# Patient Record
Sex: FEMALE | Race: WHITE | NOT HISPANIC OR LATINO | Employment: PART TIME | URBAN - METROPOLITAN AREA
[De-identification: names, ages, dates, MRNs, and addresses within clinical notes are randomized per-mention and may not be internally consistent; named-entity substitution may affect disease eponyms.]

---

## 2021-02-05 ENCOUNTER — OFFICE VISIT - HEALTHEAST (OUTPATIENT)
Dept: FAMILY MEDICINE | Facility: CLINIC | Age: 26
End: 2021-02-05

## 2021-02-05 DIAGNOSIS — F41.1 GENERALIZED ANXIETY DISORDER: ICD-10-CM

## 2021-02-05 DIAGNOSIS — F33.1 MODERATE EPISODE OF RECURRENT MAJOR DEPRESSIVE DISORDER (H): ICD-10-CM

## 2021-02-05 LAB
ALBUMIN SERPL-MCNC: 4.1 G/DL (ref 3.5–5)
ALP SERPL-CCNC: 86 U/L (ref 45–120)
ALT SERPL W P-5'-P-CCNC: 13 U/L (ref 0–45)
ANION GAP SERPL CALCULATED.3IONS-SCNC: 11 MMOL/L (ref 5–18)
AST SERPL W P-5'-P-CCNC: 13 U/L (ref 0–40)
BASOPHILS # BLD AUTO: 0.1 THOU/UL (ref 0–0.2)
BASOPHILS NFR BLD AUTO: 1 % (ref 0–2)
BILIRUB SERPL-MCNC: 0.7 MG/DL (ref 0–1)
BUN SERPL-MCNC: 11 MG/DL (ref 8–22)
CALCIUM SERPL-MCNC: 9.3 MG/DL (ref 8.5–10.5)
CHLORIDE BLD-SCNC: 102 MMOL/L (ref 98–107)
CO2 SERPL-SCNC: 24 MMOL/L (ref 22–31)
CREAT SERPL-MCNC: 0.72 MG/DL (ref 0.6–1.1)
EOSINOPHIL # BLD AUTO: 0.1 THOU/UL (ref 0–0.4)
EOSINOPHIL NFR BLD AUTO: 2 % (ref 0–6)
ERYTHROCYTE [DISTWIDTH] IN BLOOD BY AUTOMATED COUNT: 11.5 % (ref 11–14.5)
GFR SERPL CREATININE-BSD FRML MDRD: >60 ML/MIN/1.73M2
GLUCOSE BLD-MCNC: 84 MG/DL (ref 70–125)
HCT VFR BLD AUTO: 37.3 % (ref 35–47)
HGB BLD-MCNC: 12.8 G/DL (ref 12–16)
IMM GRANULOCYTES # BLD: 0 THOU/UL
IMM GRANULOCYTES NFR BLD: 0 %
LYMPHOCYTES # BLD AUTO: 1.6 THOU/UL (ref 0.8–4.4)
LYMPHOCYTES NFR BLD AUTO: 23 % (ref 20–40)
MCH RBC QN AUTO: 30.8 PG (ref 27–34)
MCHC RBC AUTO-ENTMCNC: 34.3 G/DL (ref 32–36)
MCV RBC AUTO: 90 FL (ref 80–100)
MONOCYTES # BLD AUTO: 0.6 THOU/UL (ref 0–0.9)
MONOCYTES NFR BLD AUTO: 9 % (ref 2–10)
NEUTROPHILS # BLD AUTO: 4.6 THOU/UL (ref 2–7.7)
NEUTROPHILS NFR BLD AUTO: 65 % (ref 50–70)
PLATELET # BLD AUTO: 297 THOU/UL (ref 140–440)
PMV BLD AUTO: 8.7 FL (ref 7–10)
POTASSIUM BLD-SCNC: 3.9 MMOL/L (ref 3.5–5)
PROT SERPL-MCNC: 7.5 G/DL (ref 6–8)
RBC # BLD AUTO: 4.16 MILL/UL (ref 3.8–5.4)
SODIUM SERPL-SCNC: 137 MMOL/L (ref 136–145)
TSH SERPL DL<=0.005 MIU/L-ACNC: 1.32 UIU/ML (ref 0.3–5)
WBC: 7 THOU/UL (ref 4–11)

## 2021-02-05 ASSESSMENT — PATIENT HEALTH QUESTIONNAIRE - PHQ9: SUM OF ALL RESPONSES TO PHQ QUESTIONS 1-9: 22

## 2021-02-05 ASSESSMENT — ANXIETY QUESTIONNAIRES
7. FEELING AFRAID AS IF SOMETHING AWFUL MIGHT HAPPEN: SEVERAL DAYS
IF YOU CHECKED OFF ANY PROBLEMS ON THIS QUESTIONNAIRE, HOW DIFFICULT HAVE THESE PROBLEMS MADE IT FOR YOU TO DO YOUR WORK, TAKE CARE OF THINGS AT HOME, OR GET ALONG WITH OTHER PEOPLE: VERY DIFFICULT
GAD7 TOTAL SCORE: 15
2. NOT BEING ABLE TO STOP OR CONTROL WORRYING: NEARLY EVERY DAY
1. FEELING NERVOUS, ANXIOUS, OR ON EDGE: NEARLY EVERY DAY
5. BEING SO RESTLESS THAT IT IS HARD TO SIT STILL: SEVERAL DAYS
4. TROUBLE RELAXING: MORE THAN HALF THE DAYS
6. BECOMING EASILY ANNOYED OR IRRITABLE: MORE THAN HALF THE DAYS
3. WORRYING TOO MUCH ABOUT DIFFERENT THINGS: NEARLY EVERY DAY

## 2021-02-05 ASSESSMENT — MIFFLIN-ST. JEOR: SCORE: 1416.76

## 2021-02-12 ENCOUNTER — COMMUNICATION - HEALTHEAST (OUTPATIENT)
Dept: FAMILY MEDICINE | Facility: CLINIC | Age: 26
End: 2021-02-12

## 2021-03-04 ENCOUNTER — COMMUNICATION - HEALTHEAST (OUTPATIENT)
Dept: FAMILY MEDICINE | Facility: CLINIC | Age: 26
End: 2021-03-04

## 2021-03-04 DIAGNOSIS — F33.1 MODERATE EPISODE OF RECURRENT MAJOR DEPRESSIVE DISORDER (H): ICD-10-CM

## 2021-03-04 DIAGNOSIS — F41.1 GENERALIZED ANXIETY DISORDER: ICD-10-CM

## 2021-03-04 RX ORDER — BUPROPION HYDROCHLORIDE 150 MG/1
150 TABLET, EXTENDED RELEASE ORAL 2 TIMES DAILY
Qty: 180 TABLET | Refills: 3 | Status: SHIPPED | OUTPATIENT
Start: 2021-03-04 | End: 2021-10-27

## 2021-03-05 RX ORDER — TRAZODONE HYDROCHLORIDE 50 MG/1
50 TABLET, FILM COATED ORAL AT BEDTIME
Qty: 90 TABLET | Refills: 3 | Status: SHIPPED | OUTPATIENT
Start: 2021-03-05 | End: 2021-11-04

## 2021-03-05 RX ORDER — PAROXETINE 40 MG/1
40 TABLET, FILM COATED ORAL EVERY MORNING
Qty: 90 TABLET | Refills: 1 | Status: SHIPPED | OUTPATIENT
Start: 2021-03-05 | End: 2021-07-15

## 2021-03-17 ENCOUNTER — COMMUNICATION - HEALTHEAST (OUTPATIENT)
Dept: FAMILY MEDICINE | Facility: CLINIC | Age: 26
End: 2021-03-17

## 2021-04-02 ENCOUNTER — COMMUNICATION - HEALTHEAST (OUTPATIENT)
Dept: FAMILY MEDICINE | Facility: CLINIC | Age: 26
End: 2021-04-02

## 2021-05-27 ASSESSMENT — PATIENT HEALTH QUESTIONNAIRE - PHQ9: SUM OF ALL RESPONSES TO PHQ QUESTIONS 1-9: 22

## 2021-05-28 ASSESSMENT — ANXIETY QUESTIONNAIRES: GAD7 TOTAL SCORE: 15

## 2021-06-05 VITALS
SYSTOLIC BLOOD PRESSURE: 106 MMHG | RESPIRATION RATE: 16 BRPM | BODY MASS INDEX: 24.46 KG/M2 | HEART RATE: 80 BPM | HEIGHT: 65 IN | WEIGHT: 146.8 LBS | DIASTOLIC BLOOD PRESSURE: 65 MMHG

## 2021-06-15 NOTE — PROGRESS NOTES
Assessment & Plan     1. Generalized anxiety disorder  Thyroid [facility]    Comprehensive Metabolic Panel    1(CBC and Differential)    AMB REFERRAL TO MENTAL HEALTH AND ADDICTION  - Adult (18+); Assessment and Testing; General Psychological Testing; [facility] [phone]; We will contact you to schedule the appointment or please call with any question...    Ambulatory referral to Psychiatry   2. Moderate episode of recurrent major depressive disorder (H)  Thyroid [facility]    Comprehensive Metabolic Panel    1(CBC and Differential)    AMB REFERRAL TO MENTAL HEALTH AND ADDICTION  - Adult (18+); Assessment and Testing; General Psychological Testing; [facility] [phone]; We will contact you to schedule the appointment or please call with any question...    buPROPion (WELLBUTRIN SR) 150 MG 12 hr tablet    Ambulatory referral to Psychiatry     Medical decision making: Nimisha is a 25-year-old-year-old female with major depression and anxiety.  A referral to psychiatry has been done.  She denies any active plans of suicide.  Is a depression that is most bothersome to her.  We discussed augmenting with Wellbutrin.  We discussed the pros and cons and patient is willing to give this a try.  She will start with 1 tablet for about 3 days to see how it makes her feel and then go to 1 tablet 2 times daily.    She had initially scheduled this appointment as a physical exam.  However she really wanted to address the above issues.  She is up-to-date with her Pap and due in November when she will return for a physical.    Regarding her acne, she uses OTC Differin gel and feels it is under good control.           Follow Up    Follow Up Actions Taken  Mental Health Referral placed      Discussed the following ways the patient can remain in a safe environment:  be around others    Return in about 9 months (around 11/5/2021).    Martin Sanchez MD  [facility]  CLINIC [facility]    Subjective   Chief Complaint   Patient presents with     [facility]     Annual Exam     would like blood work, not fasting     Nimisha Jasso is 25 y.o. and presents to clinic today for the following health issues   HPI patient here today for follow-up of her depression and anxiety.  In the past she has followed at [facility] in [place].  These records available in care everywhere.  She has been on Paxil for her depression after being tried on other medications.  She does not remember exactly what medication has been tried.  She also uses trazodone for sleep.  Although it is not perfect, it works for her mostly.  She would like some blood work done today for her depression.  She would like specifically thyroid to be checked as there is family history of low thyroid.  She describes herself as being a sad child per her mom.  In high school, she was the query one.  In college, she came out in the open for being a lesbian.  However, she does not think this has any implication or depression.  Currently she is under some stress due to long distance relationship with her partner who is currently in [employer] teaching English.  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  She is currently sharing residence with 2 of her other friends from college.  And this is recent in January and has been a real help.  She feels financially stable.  No growing up, she did have a eating disorder and today allows to have her weight checked but does not want to know.      Depression and Anxiety Follow-Up    How are you doing with your depression since your last visit?: Worse    How are you doing with your anxiety since your last visit?: Medium    Are you having other symptoms that might be associated with depression or anxiety?: Yes:  Muscle aches , back pain, weakness,      Have you had a significant life event?: Relationship Concerns     Do you have  any concerns with your use of alcohol or other drugs?: No    Social History     Tobacco Use     Smoking status: Never Smoker     Smokeless tobacco: Never Used   Substance Use Topics     Alcohol use: Yes     Frequency: 2-4 times a month     Drinks per session: 1 or 2     Binge frequency: Never     Drug use: Never       PHQ-9 DEPRESSION SCALE 2021   Little interest or pleasure in doing things 3   Feeling down, depressed, or hopeless 3   Trouble falling or staying asleep, or sleeping too much 2   Feeling tired or having little energy 3   Poor appetite or overeating 3   Feeling bad about yourself - or that you are a failure or have let yourself or your family down 3   Trouble concentrating on things, such as reading the newspaper or watching television 2   Moving or speaking so slowly that other people could have noticed. Or the opposite - being so fidgety or restless that you have been moving around a lot more than usual 1   Thoughts that you would be better off dead, or of hurting yourself in some way 2   PHQ-9 Total Score 22   If you checked off any problems, how difficult have these problems made it for you to do your work, take care of things at home, or get along with other people? Extremely dIfficult   Some recent data might be hidden       BRISA-7 Screening Results:  Feeling nervous, anxious or on edge: 3 (2021  4:00 PM)  Not being able to stop or control worry: 3 (2021  4:00 PM)  Worrying too much about different things: 3 (2021  4:00 PM)  Trouble relaxin (2021  4:00 PM)  Being so restless that is is hard to sit still: 1 (2021  4:00 PM)  Becoming easily annnoyed or irritable: 2 (2021  4:00 PM)  Feeling afraid as if something awful might happen: 1 (2021  4:00 PM)  BRISA-7 Total: 15 (2021  4:00 PM)  How difficult did these problems make it for you to do your work, take care of things at home or get along with other people? : Very difficult (2021  4:00 PM)  How difficult did  "these problems make it for you to do your work, take care of things at home or get along with other people? : Very difficult (2/5/2021  4:00 PM)      Suicide Assessment Five-step Evaluation and Treatment (SAFE-T)    How many servings of fruits and vegetables do you eat daily?  2-3    On average, how many sweetened beverages do you drink each day (Examples: soda, juice, sweet tea, etc.  Do NOT count diet or artificially sweetened beverages)?   1    How many days per week do you exercise enough to make your heart beat faster? 3 or less    How many minutes a day do you exercise enough to make your heart beat faster? 10 - 19    How many days per week do you miss taking your medication? 0        Review of Systems  Denies shortness of breath, chest pain.  Denies nausea/vomiting.      Objective    /65 (Patient Site: Left Arm, Patient Position: Sitting, Cuff Size: Adult Regular)   Pulse 80   Resp 16   Ht 5' 5\" (1.651 m)   Wt 146 lb 12.8 oz (66.6 kg)   LMP 01/23/2021 (Approximate)   BMI 24.43 kg/m    Body mass index is 24.43 kg/m .  Physical Exam  GENERAL: Healthy, alert and no distress  EYES: Eyes grossly normal to inspection. No discharge or erythema, or obvious scleral/conjunctival abnormalities.  NECK: no adenopathy, thyroid normal to palpation and trachea midline and normal to palpation  RESP: lungs clear to auscultation - no rales, rhonchi or wheezes  CV: regular rates and rhythm and no murmur, click or rub  NEURO: Cranial nerves grossly intact. Mentation and speech appropriate for age.  PSYCH: Mentation appears normal, affect normal/bright, judgement and insight intact, normal speech and appearance well-groomed              "

## 2021-06-15 NOTE — TELEPHONE ENCOUNTER
Requested Prescriptions     Pending Prescriptions Disp Refills     buPROPion (WELLBUTRIN SR) 150 MG 12 hr tablet 60 tablet 2     Sig: Take 1 tablet (150 mg total) by mouth 2 (two) times a day.

## 2021-06-15 NOTE — TELEPHONE ENCOUNTER
Refill Approved    Rx renewed per Medication Renewal Policy. Medication was last renewed on 2/5/21, last OV 2/5/21.    Mira Morales, [facility] Triage/Med Refill 3/4/2021     Requested Prescriptions   Pending Prescriptions Disp Refills     buPROPion (WELLBUTRIN SR) 150 MG 12 hr tablet 180 tablet 3     Sig: Take 1 tablet (150 mg total) by mouth 2 (two) times a day.       Tricyclics/Misc Antidepressant/Antianxiety Meds Refill Protocol Passed - 3/4/2021 10:50 AM        Passed - PCP or prescribing provider visit in last year     Last office visit with prescriber/PCP: Visit date not found OR same dept: Visit date not found OR same specialty: Visit date not found  Last physical: 2/5/2021 Last MTM visit: Visit date not found   Next visit within 3 mo: Visit date not found  Next physical within 3 mo: Visit date not found  Prescriber OR PCP: Martin Sanchez MD  Last diagnosis associated with med order: 1. Moderate episode of recurrent major depressive disorder (H)  - buPROPion (WELLBUTRIN SR) 150 MG 12 hr tablet; Take 1 tablet (150 mg total) by mouth 2 (two) times a day.  Dispense: 180 tablet; Refill: 3    If protocol passes may refill for 12 months if within 3 months of last provider visit (or a total of 15 months).

## 2021-06-16 PROBLEM — F33.1 MODERATE EPISODE OF RECURRENT MAJOR DEPRESSIVE DISORDER (H): Status: ACTIVE | Noted: 2018-11-23

## 2021-06-16 PROBLEM — F41.1 GENERALIZED ANXIETY DISORDER: Status: ACTIVE | Noted: 2017-07-19

## 2021-06-16 PROBLEM — L70.0 ACNE VULGARIS: Status: ACTIVE | Noted: 2017-11-03

## 2021-06-16 PROBLEM — G47.00 INSOMNIA: Status: ACTIVE | Noted: 2018-11-23

## 2021-06-18 NOTE — PATIENT INSTRUCTIONS - HE
Patient Instructions by Martin Sanchez MD at 2/5/2021  3:10 PM     Author: Martin Sanchez MD Service: -- Author Type: Physician    Filed: 2/5/2021  3:57 PM Encounter Date: 2/5/2021 Status: Signed    : Martin Sanchez MD (Physician)       Patient Education     Depression: Tips to Help Yourself    As your healthcare providers help treat your depression, you can also help yourself. Keep in mind that your illness affects you emotionally, physically, mentally, and socially. So full recovery will take time. Take care of your body and your soul, and be patient with yourself as you get better.  Self-care    Educate yourself. Read about treatment and medicine options. If you have the energy, attend local conferences or support groups. Keep a list of useful websites and helpful books and use them as needed. This illness is not your fault. Dont blame yourself for your depression.    Manage early symptoms. If you notice symptoms returning, experience triggers, or identify other factors that may lead to a depressive episode, get help as soon as possible. Ask trusted friends and family to monitor your behavior and let you know if they see anything of concern.    Work with your provider. Find a provider you can trust. Communicate honestly with that person and share information on your treatment for depression and your reaction to medicines.    Be prepared for a crisis. Know what to do if you experience a crisis. Keep the phone number of a crisis hotline and know the location of your community's urgent care centers and the closest emergency department.    Hold off on big decisions. Depression can cloud your judgment. So wait until you feel better before making major life decisions, such as changing jobs, moving, or getting  or .    Be patient. Recovering from depression is a process. Dont be discouraged if it takes some time to feel better.    Keep it simple. Depression saps your energy and  concentration. So you wont be able to do all the things you used to do. Set small goals and do what you can.    Be with others. Dont isolate yourself--youll only feel worse. Try to be with other people. And take part in fun activities when you can. Go to a movie, ballgame, [employer] service, or social event. Talk openly with people you can trust. And accept help when its offered.  Take care of your body  People with depression often lose the desire to take care of themselves. That only makes their problems worse. During treatment and afterward, make a point to:    Exercise. Its a great way to take care of your body. And studies have shown that exercise helps fight depression.    Avoid drugs and alcohol. These may ease the pain in the short term. But theyll only make your problems worse in the long run.    Get relief from stress. Ask your healthcare provider for relaxation exercises and techniques to help relieve stress.    Eat right. A balanced and healthy diet helps keep your body healthy.  Date Last Reviewed: 1/1/2017 2000-2019 [employer]. [address] [place] [address], [place] [address]. All rights reserved. This information is not intended as a substitute for professional medical care. Always follow your healthcare professional's instructions.

## 2021-06-27 ENCOUNTER — HEALTH MAINTENANCE LETTER (OUTPATIENT)
Age: 26
End: 2021-06-27

## 2021-07-12 DIAGNOSIS — F33.1 MODERATE EPISODE OF RECURRENT MAJOR DEPRESSIVE DISORDER (H): ICD-10-CM

## 2021-07-12 DIAGNOSIS — F41.1 GENERALIZED ANXIETY DISORDER: ICD-10-CM

## 2021-07-12 NOTE — TELEPHONE ENCOUNTER
Reason for Call:  Other prescription    Detailed comments: Patient called stating she has a psychiatry appt in a month and she thinks she may need a refill on the paroxetine. Patient stated that if she stopped taking it does make her have some withdrawals. Patient is wondering what provider recommends. She would like a call back.      Phone Number Patient can be reached at: Cell number on file:    Telephone Information:   Mobile [phone]       Best Time: Any time    Can we leave a detailed message on this number? YES    Call taken on 7/12/2021 at 2:35 PM by Verna Chapman

## 2021-07-13 RX ORDER — PAROXETINE 40 MG/1
40 TABLET, FILM COATED ORAL EVERY MORNING
Qty: 90 TABLET | Refills: 1 | OUTPATIENT
Start: 2021-07-13

## 2021-07-13 NOTE — TELEPHONE ENCOUNTER
Need the pharmacy to send prescription.  Is patient taking Wellbutrin and trazodone?  I had sent 90 pills in March with 1 refill and so she should already have a refill for Paxil.  Did she wean herself off or stop suddenly?  She may benefit from a video/telephone visit for further discussion.

## 2021-07-14 NOTE — TELEPHONE ENCOUNTER
Pt stated she is taking Wellbutrin and trazodone, and slowly tapered off paroxetine. Pt having emotional pain of 9 and 10 daily and has concerns about waiting until her psychiatrist appt in August. Pt schedule for VV 7/25 with DR ESPINAL Pharmacy is [facility].

## 2021-07-15 ENCOUNTER — VIRTUAL VISIT (OUTPATIENT)
Dept: FAMILY MEDICINE | Facility: CLINIC | Age: 26
End: 2021-07-15

## 2021-07-15 DIAGNOSIS — F41.1 GENERALIZED ANXIETY DISORDER: ICD-10-CM

## 2021-07-15 DIAGNOSIS — F32.2 SEVERE MAJOR DEPRESSION WITHOUT PSYCHOTIC FEATURES (H): Primary | ICD-10-CM

## 2021-07-15 PROCEDURE — 96127 BRIEF EMOTIONAL/BEHAV ASSMT: CPT | Performed by: FAMILY MEDICINE

## 2021-07-15 PROCEDURE — 99213 OFFICE O/P EST LOW 20 MIN: CPT | Mod: 95 | Performed by: FAMILY MEDICINE

## 2021-07-15 RX ORDER — PAROXETINE 40 MG/1
40 TABLET, FILM COATED ORAL EVERY MORNING
Qty: 90 TABLET | Refills: 1 | Status: SHIPPED | OUTPATIENT
Start: 2021-07-15 | End: 2021-10-27

## 2021-07-15 ASSESSMENT — ANXIETY QUESTIONNAIRES
GAD7 TOTAL SCORE: 14
7. FEELING AFRAID AS IF SOMETHING AWFUL MIGHT HAPPEN: MORE THAN HALF THE DAYS
2. NOT BEING ABLE TO STOP OR CONTROL WORRYING: MORE THAN HALF THE DAYS
IF YOU CHECKED OFF ANY PROBLEMS ON THIS QUESTIONNAIRE, HOW DIFFICULT HAVE THESE PROBLEMS MADE IT FOR YOU TO DO YOUR WORK, TAKE CARE OF THINGS AT HOME, OR GET ALONG WITH OTHER PEOPLE: VERY DIFFICULT
6. BECOMING EASILY ANNOYED OR IRRITABLE: NEARLY EVERY DAY
4. TROUBLE RELAXING: SEVERAL DAYS
1. FEELING NERVOUS, ANXIOUS, OR ON EDGE: MORE THAN HALF THE DAYS
5. BEING SO RESTLESS THAT IT IS HARD TO SIT STILL: MORE THAN HALF THE DAYS
3. WORRYING TOO MUCH ABOUT DIFFERENT THINGS: MORE THAN HALF THE DAYS

## 2021-07-15 ASSESSMENT — PATIENT HEALTH QUESTIONNAIRE - PHQ9: SUM OF ALL RESPONSES TO PHQ QUESTIONS 1-9: 22

## 2021-07-15 NOTE — PROGRESS NOTES
Nimisha is a 25 year old who is being evaluated via a billable video visit.      How would you like to obtain your AVS? MyChart  If the video visit is dropped, the invitation should be resent by: Text to cell phone: [phone]  Will anyone else be joining your video visit? No    Video Start Time: 1:05 PM    Assessment & Plan     ICD-10-CM    1. Severe major depression without psychotic features (H)  F32.2    2. Generalized anxiety disorder  F41.1      Medical decision making: Patient is a 25-year-old with severe depression and anxiety who presents today for further management.  She has been on Paxil for a long time and did not think it was helping much and wean herself off.  She describes her depression as mental pain with suicidal thoughts and ideation without a plan.  At this time we discussed various options including adding a atypical antipsychotic like Seroquel.  Patient was contemplating this.  Also offered increasing the dose of Wellbutrin.  Note that I had started her on Wellbutrin which did help but taking the evening dose was making her too active in the evening.  She ended up taking only the morning dose.    Finally at this time, she decided that she will go up on her Wellbutrin dose in the morning and see how it helps her in the next few days otherwise reach out to me to start Seroquel.  Patient does have an appointment coming up with psychiatry in August.  She reassures me that she is supported by her friends were well aware about her mental health condition and will seek help with can suicidal thoughts and ideation and reach out to me too.  [id number]}     Depression Screening Follow Up    PHQ 7/15/2021   PHQ-9 Total Score 22   Q9: Thoughts of better off dead/self-harm past 2 weeks More than half the days     Follow Up Actions Taken  Scheduled appointment with [facility]  Mental Health Referral placed    Discussed the following ways the patient can remain in a safe environment:  be around others  MEDICATIONS:         - Increase to 2 tablets daily of Wellbutrin.  She wants to avoid taking the lingers       - Continue other medications without change    No follow-ups on file.    Martin Sanchez MD  [facility]    Kaia Bansal is a 25 year old who presents for the following health issues  accompanied by Nimisha RENATO Romero's self:    HPI   Chief Complaint   Patient presents with     Anxiety     GAD7 and PHQ9 done today     Patient is a 25-year-old with moderately severe depression worsening over past presents today for additional medication to overcome current mental health pain that she is undergoing.  She had weaned herself off Paxil as despite being on for few years it had not helped.  At last visit, had started on Wellbutrin which she thinks really helped but taking the evening dose was making her too active and she was taking only 1 pill in the morning.  She was taking trazodone at night but makes her groggy.  She feels that her sleep is not adequate and was wondering about alternative medication  She continues to have suicidal thoughts and ideation.  (  Same as in the past visit). .  She denies any plan.  She does have very supportive roommates were best friends and they are aware about her mental health per patient.  Objective           Vitals:  No vitals were obtained today due to virtual visit.    Physical Exam   GENERAL: Healthy, alert and no distress  EYES: Eyes grossly normal to inspection.  No discharge or erythema, or obvious scleral/conjunctival abnormalities.  RESP: No audible wheeze, cough, or visible cyanosis.  No visible retractions or increased work of breathing.    SKIN: Visible skin clear. No significant rash, abnormal pigmentation or lesions.  NEURO: Cranial nerves grossly intact.  Mentation and speech appropriate for age.  PSYCH: mentation appears normal, affect flat, judgement and insight intact and appearance well groomed          Video-Visit Details    Type of  service:  Video Visit    Video End Time:1:20    Originating Location (pt. Location): Home    Distant Location (provider location):  [facility]     Platform used for Video Visit: Doximity     PHQ 7/15/2021   PHQ-9 Total Score 22   Q9: Thoughts of better off dead/self-harm past 2 weeks More than half the days     BRISA-7 SCORE 2/5/2021 7/15/2021   Total Score 15 14     PHQ 2/5/2021 7/15/2021   PHQ-9 Total Score 22 22   Q9: Thoughts of better off dead/self-harm past 2 weeks More than half the days More than half the days

## 2021-07-15 NOTE — PATIENT INSTRUCTIONS
"Jessikamarcus GROSS Romero     SAFETY PLAN:  Step 1: Warning signs / cues (Thoughts, images, mood, situation, behavior) that a crisis may be developing:    Thoughts: \"I don't matter\", \"People would be better off without me\", \"I'm a burden\", \"I can't do this anymore\", \"I just want this to end\" and \"Nothing makes it better\"    Images: visions of harm: .    Thinking Processes: racing thoughts and highly critical and negative thoughts: .    Mood: worsening depression, hopelessness, helplessness, intense anger and intense worry    Behaviors: isolating/withdrawing , can't stop crying, giving things away, saying good-bye, not taking care of myself, not taking care of my responsibilities, sleeping too much and not sleeping enough    Situations: pain   Step 2: Coping strategies - Things I can do to take my mind off of my problems without contacting another person (relaxation technique, physical activity):    Distress Tolerance Strategies:  relaxation activities: anything you enjoy, pray, paced breathing/progressive muscle relaxation and BEING WITH YOUR ROOM MATES/ FRIENDS    Physical Activities: yoga, meditation, deep breathing and stretching     Focus on helpful thoughts:  \"This is temporary\", \"I will get through this\", \"It always passes\" and \"Ride the wave\"  Step 3: People and social settings that provide distraction:   Do talk to your roommates and friends and seek support    volunteering, support group (i.e. twelve-step) and work   Step 4: Remind myself of people and things that are important to me and worth living for: Your friends and family that you associate with      Step 5: When I am in crisis, I can ask these people to help me use my safety plan:   Your roommates who are very good friends  Step 6: Making the environment safe:     be around others  Step 7: Professionals or agencies I can contact during a crisis:    [facility] Daytime Number: " [phone]    Suicide Prevention Lifeline: 7-544-327-TALK (0263)    Crisis Text Line Service (available 24 hours a day, 7 days a week): Text [place] to 815848    Call 911 or go to my nearest emergency department.   I helped develop this safety plan and agree to use it when needed.  I have been given a copy of this plan.      Client signature _________________________________________________________________  Today s date:  7/15/2021  Adapted from Safety Plan Template 2008 Yajaira Roque and Shahab Pugh is reprinted with the express permission of the authors.  No portion of the Safety Plan Template may be reproduced without the express, written permission.  You can contact the authors at [email or web address] or [email or web address].

## 2021-07-16 ENCOUNTER — TELEPHONE (OUTPATIENT)
Dept: FAMILY MEDICINE | Facility: CLINIC | Age: 26
End: 2021-07-16

## 2021-07-16 DIAGNOSIS — F32.2 SEVERE MAJOR DEPRESSION WITHOUT PSYCHOTIC FEATURES (H): Primary | ICD-10-CM

## 2021-07-16 ASSESSMENT — ANXIETY QUESTIONNAIRES: GAD7 TOTAL SCORE: 14

## 2021-07-16 NOTE — TELEPHONE ENCOUNTER
Incoming call from pt with concern of withdrawal symptoms after stopping paxil. Pt stated that she is dizzy, nauseous, lightheaded. Pt started noticing some symptoms when she cut down to half a pill but now that she has been completely off the medication for a couple days the symptoms just keep getting worse. Pt is wondering if there is something she can take or do in the meantime to help with the withdrawal symptoms?

## 2021-07-17 RX ORDER — PAROXETINE 20 MG/1
20 TABLET, FILM COATED ORAL EVERY MORNING
Qty: 30 TABLET | Refills: 0 | Status: SHIPPED | OUTPATIENT
Start: 2021-07-17 | End: 2021-10-27

## 2021-07-17 NOTE — TELEPHONE ENCOUNTER
I have sent a small prescription of Paxil 20 mg and sent patient a message for weaning protocol until she sees psychiatry.    Martin Sanchez MD

## 2021-08-11 DIAGNOSIS — F32.2 SEVERE MAJOR DEPRESSION WITHOUT PSYCHOTIC FEATURES (H): ICD-10-CM

## 2021-08-12 DIAGNOSIS — F32.2 SEVERE MAJOR DEPRESSION WITHOUT PSYCHOTIC FEATURES (H): ICD-10-CM

## 2021-08-14 RX ORDER — PAROXETINE 20 MG/1
20 TABLET, FILM COATED ORAL EVERY MORNING
Qty: 30 TABLET | Refills: 0 | OUTPATIENT
Start: 2021-08-14

## 2021-08-27 ENCOUNTER — TELEPHONE (OUTPATIENT)
Dept: BEHAVIORAL HEALTH | Facility: CLINIC | Age: 26
End: 2021-08-27

## 2021-08-27 ENCOUNTER — HOSPITAL ENCOUNTER (OUTPATIENT)
Dept: BEHAVIORAL HEALTH | Facility: CLINIC | Age: 26
Discharge: HOME | End: 2021-08-27
Attending: FAMILY MEDICINE | Admitting: FAMILY MEDICINE
Payer: COMMERCIAL

## 2021-08-27 PROCEDURE — 90791 PSYCH DIAGNOSTIC EVALUATION: CPT | Mod: GT | Performed by: COUNSELOR

## 2021-08-27 RX ORDER — ESCITALOPRAM OXALATE 10 MG/1
10 TABLET ORAL DAILY
COMMUNITY
End: 2021-10-26

## 2021-08-27 ASSESSMENT — ANXIETY QUESTIONNAIRES
7. FEELING AFRAID AS IF SOMETHING AWFUL MIGHT HAPPEN: NEARLY EVERY DAY
4. TROUBLE RELAXING: MORE THAN HALF THE DAYS
1. FEELING NERVOUS, ANXIOUS, OR ON EDGE: NEARLY EVERY DAY
3. WORRYING TOO MUCH ABOUT DIFFERENT THINGS: NEARLY EVERY DAY
GAD7 TOTAL SCORE: 20
5. BEING SO RESTLESS THAT IT IS HARD TO SIT STILL: NEARLY EVERY DAY
2. NOT BEING ABLE TO STOP OR CONTROL WORRYING: NEARLY EVERY DAY
6. BECOMING EASILY ANNOYED OR IRRITABLE: NEARLY EVERY DAY

## 2021-08-27 ASSESSMENT — COLUMBIA-SUICIDE SEVERITY RATING SCALE - C-SSRS
TOTAL  NUMBER OF INTERRUPTED ATTEMPTS PAST 3 MONTHS: NO
TOTAL  NUMBER OF ABORTED OR SELF INTERRUPTED ATTEMPTS PAST LIFETIME: NO
3. HAVE YOU BEEN THINKING ABOUT HOW YOU MIGHT KILL YOURSELF?: YES
5. HAVE YOU STARTED TO WORK OUT OR WORKED OUT THE DETAILS OF HOW TO KILL YOURSELF? DO YOU INTEND TO CARRY OUT THIS PLAN?: NO
TOTAL  NUMBER OF ABORTED OR SELF INTERRUPTED ATTEMPTS PAST 3 MONTHS: NO
4. HAVE YOU HAD THESE THOUGHTS AND HAD SOME INTENTION OF ACTING ON THEM?: YES
4. HAVE YOU HAD THESE THOUGHTS AND HAD SOME INTENTION OF ACTING ON THEM?: NO
2. HAVE YOU ACTUALLY HAD ANY THOUGHTS OF KILLING YOURSELF LIFETIME?: YES
1. IN THE PAST MONTH, HAVE YOU WISHED YOU WERE DEAD OR WISHED YOU COULD GO TO SLEEP AND NOT WAKE UP?: YES
ATTEMPT PAST THREE MONTHS: NO
5. HAVE YOU STARTED TO WORK OUT OR WORKED OUT THE DETAILS OF HOW TO KILL YOURSELF? DO YOU INTEND TO CARRY OUT THIS PLAN?: NO
TOTAL  NUMBER OF INTERRUPTED ATTEMPTS LIFETIME: NO
ATTEMPT LIFETIME: NO
1. IN THE PAST MONTH, HAVE YOU WISHED YOU WERE DEAD OR WISHED YOU COULD GO TO SLEEP AND NOT WAKE UP?: YES
2. HAVE YOU ACTUALLY HAD ANY THOUGHTS OF KILLING YOURSELF?: YES
6. HAVE YOU EVER DONE ANYTHING, STARTED TO DO ANYTHING, OR PREPARED TO DO ANYTHING TO END YOUR LIFE?: NO
6. HAVE YOU EVER DONE ANYTHING, STARTED TO DO ANYTHING, OR PREPARED TO DO ANYTHING TO END YOUR LIFE?: NO

## 2021-08-27 ASSESSMENT — PATIENT HEALTH QUESTIONNAIRE - PHQ9: SUM OF ALL RESPONSES TO PHQ QUESTIONS 1-9: 25

## 2021-08-27 NOTE — PATIENT INSTRUCTIONS
Welcome to the Adult Mental Health Outpatient Programs. Thank you for choosing us at [facility]!     Managing mental health symptoms while balancing life stressors can be a struggle. Our mental health programming will provide you the therapy, education, skills and support needed to improve your well-being and to live a healthy and more manageable lifestyle.     After completing the Diagnostic Assessment, you will receive a recommendation for a level of care or specialty service that is right for you. Our Outpatient Mental Health programs are all group-based and allow you to meet with peers who are trying to manage similar symptoms or life circumstances in a safe and therapeutic setting.     Program Recommendations for: Adult Day Treatment     You will be scheduled to join the following program:  Adult Day Treatment    You will be in the follow group /track:  5A    Please attend: [facility]          at: 9 am - 12 pm     Start date: 08/30/2021    What will happen next?      You will receive a series of calls from our [facility] providers and/or schedulers to prepare you for your program participation.       Admission Call: Program staff will contact you after your diagnostic assessment to provide a brief check in and to complete the admission process. We will ask you about concerns that may need to be addressed right away. This could include: personal safety, insurance clarification, technology access, or another concern you may have. This call may occur days in advance or right before your first scheduled group.       Physical Health Screen Call:  A nurse will contact you either prior to admission or during your first week in the program to ask a few required physical health screening questions and discuss concerns as needed.      Provider/Scheduling Call: Program staff may also call to schedule an individual appointment with a psychiatrist or psychologist (therapist). This will depend on your needs  and may be required for the program that was recommended for you. This program requirement does NOT replace your follow up with your community provider.  However, it is important that you do NOT see your community psychiatric provider on the same day that you see the program psychiatrist. If you do, this could result in a denial from your insurance. Please let us know if you have any concerns and we will help you.      Disability or FMLA paperwork requests: Please coordinate with your community provider to complete paperwork. This will be easiest for you. Most of the time, they want the same provider to follow you during your time off. If you do not have a community provider, please schedule an appointment with one as soon as possible. The Partial Hospitalization Program provider may assist with paperwork if you have not already established care in the community. However, this is a short-term program and responsibility for paperwork must transfer to another provider when you discharge from the Partial Hospitalization Program. We do NOT have a provider to complete paperwork in Adult Day Treatment, Adult Dual Disorder Program or 55+ Program at this time.      WAIT LIST: If you are placed on the Wait List following your diagnostic assessment, you will receive a phone call within a few days to discuss a tentative start date and plan.  Please contact us at the phone number listed below at any time with additional questions or if you are choosing to be removed from the Wait List.      What if I still have questions or cannot attend as planned? :  Adult Day Treatment [phone].     We hope to be able to answer your questions during the admission call. You can also reach out to us by contacting the program directly at:  Adult Day Treatment [phone].     Sincerely,   Your Outpatient Adult Mental Health Program Team             SAFETY PLAN:    Step 1: Warning signs / cues (Thoughts, images, mood, situation, behavior)  "that a crisis may be developing:      Thoughts: \"I don't matter\", \"People would be better off without me\" and \"I'm a burden\"    Images: obsessive thoughts of death or dying: someone harming me and visions of harm:     Thinking Processes: ruminations (can't stop thinking about my problems), racing thoughts, intrusive thoughts (bothersome, unwanted thoughts that come out of nowhere), highly critical and negative thoughts and disorganized thinking.    Mood: worsening depression, hopelessness and helplessness    Behaviors: isolating/withdrawing  and can't stop crying    Situations: None       Step 2: Coping strategies - Things I can do to take my mind off of my problems without contacting another person (relaxation technique, physical activity):      Distress Tolerance Strategies:  arts and crafts: Draw    Physical Activities: go for a walk    Focus on helpful thoughts:  \"This is temporary\", \"I will get through this\" and \"It always passes\"    Step 3: People and social settings that provide distraction:     Roommates   watch tv, play dungeons and dragoons      Step 4: Remind myself of people and things that are important to me and worth living for:  Mother and friends    Step 5: When I am in crisis, I can ask these people to help me use my safety plan:     Gisselle Jasso (mother) [phone]    Step 6: Making the environment safe:       remove alcohol, remove drugs and be around others    Step 7: Professionals or agencies I can contact during a crisis:      Suicide Prevention Lifeline: 1-935-635-TALK (0265)    Crisis Text Line Service (available 24 hours a day, 7 days a week): Text [place] to 513571    Call  **CRISIS (062966) from a cell phone to talk to a team of professionals who can help you.    Crisis Services By County: Phone Number:   [place]     [phone]   [place]    [phone]   [place]    [phone]   [place]    [phone]   [place]    [phone]   [place] [phone]   [place]     [phone] "       Call 911 or go to my nearest emergency department.     I helped develop this safety plan and agree to use it when needed.  I have been given a copy of this plan.        Today s date:  8/27/2021  Adapted from Safety Plan Template 2008 Yajaira Roque and Shahab Pugh is reprinted with the express permission of the authors.  No portion of the Safety Plan Template may be reproduced without the express, written permission.  You can contact the authors at [email or web address] or [email or web address]

## 2021-08-27 NOTE — PROGRESS NOTES
"[facility]  Provider [facility]:  [facility]      PATIENT'S NAME: Nimisha Jasso  PREFERRED NAME: Keith  PRONOUNS:   they/them    MRN: [id number]  : 1995  ADDRESS: [address] [place], [place] [id number]  ACCT. NUMBER:  [id number]  DATE OF SERVICE: 21  START TIME: 1:30pm   END TIME: 2:30pm   PREFERRED PHONE: [phone]  May we leave a program related message: Yes  SERVICE MODALITY:  Video Visit:      Provider verified identity through the following two step process.  Patient provided:  Patient  and Patient address    Telemedicine Visit: The patient's condition can be safely assessed and treated via synchronous audio and visual telemedicine encounter.      Reason for Telemedicine Visit: Services only offered telehealth    Originating Site (Patient Location): Patient's home    Distant Site (Provider Location): [facility] MENTAL HEALTH & ADDI[facility] SERVICES    Consent:  The patient/guardian has verbally consented to: the potential risks and benefits of telemedicine (video visit) versus in person care; bill my insurance or make self-payment for services provided; and responsibility for payment of non-covered services.     Patient would like the video invitation sent by:  My Chart    Mode of Communication:  Video Conference via Amwell    As the provider I attest to compliance with applicable laws and regulations related to telemedicine.    UNIVERSAL ADULT Mental Health DIAGNOSTIC ASSESSMENT    Identifying Information:  Patient is a 26 year old,  .  The pronoun use throughout this assessment reflects the patient's chosen pronoun.  Patient was referred for an assessment by psychiatrist .  Patient attended the session alone.     Chief Complaint:   The reason for seeking services at this time is: \"Depression and suicidal ideation \"   The problem(s) began \"3 months ago things got really bad\".. Patient has attempted to resolve these " "concerns in the past through Therapy and psychiatry.    Social/Family History:  Patient reported they grew up in [place].  They were raised by biological parents.  Parents stayed . Patient reported that their childhood was \"I mean financially I was secure. I didn't have a very social upbringing. I was raised very consertivly in a very repulican household\".  Patient described their current relationships with family of origin as \"it's good\".      Patient did not identify Spiritism, ethnic or cultural issues relevant to therapy at this time. Patient identified their preferred language to be English. Patient reported they does not need the assistance of an  or other support involved in therapy.     Patient reported had no significant delays in developmental tasks.   Patient's highest education level was college graduate. Patient identified the following learning problems: none reported.  Modifications will not be used to assist communication in therapy.   Patient reports they are  able to understand written materials.    Patient reported the following relationship history .  Patient's current relationship status is single for since the end of May 2021.   Patient identified their sexual orientation as Non-binaery.  Patient reported having zero child(irene). Patient identified parents, friends and therapist as part of their support system.  Patient identified the quality of these relationships as stable and meaningful.      Patient's current living/housing situation involves staying in own home/apartment.  They live with roommate and they report that housing is stable.     Patient is currently employed full time and reports they are not able to function appropriately at work..  Patient reports their finances are obtained through employment.  Patient does not identify finances as a current stressor.      Patient reported that they have not been involved with the legal system.   Patient denies being on " "probation / parole / under the jurisdiction of the court.    Patient's Strengths and Limitations:  Patient identified the following strengths or resources that will help them succeed in treatment: friends / good social support and family support. Things that may interfere with the patient's success in treatment include: none identified.     Personal and Family Medical History:   Patient does report a family history of mental health concerns.  Patient reports family history includes Cancer in Keith Barlows mother; Depression in Keith Barlows maternal grandfather; Diabetes in Keith Barlows mother; No Known Problems in Keith Barlows brother and father; Thyroid Disease in Keith Barlows mother..     Patient does report Mental Health Diagnosis and/or Treatment.  Patient Patient reported the following previous diagnoses which include(s): an Anxiety Disorder and Depression.  Patient reported symptoms began \"high school\".   Patient has received mental health services in the past: therapy  and psychiatry.  Psychiatric Hospitalizations: None.  Patient denies a history of civil commitment.  Patient is receiving other mental health services.  These include psychotherapy with Svitlana Toro with [facility] and psychiatry with Brunilda Montes with [facility].  Next appointment: 09/06/2021 .         Patient has not had a physical exam to rule out medical causes for current symptoms.  Date of last physical exam was greater than a year ago and client was encouraged to schedule an exam with PCP. The patient has a [facility] Primary Care Provider, who is named Martin Sanchez..  Patient reports no current medical concerns.  Patient denies any issues with pain..   There are not significant appetite / nutritional concerns / weight changes.   Patient does report a history of head injury / trauma / cognitive impairment.  At 5 years old pt hit head on headboard and had to get stitches.     Patient reports " current meds as:   Outpatient Medications Marked as Taking for the 8/27/21 encounter (Hospital Encounter) with Jose Schultz [facility]   Medication Sig     calcium-vitamin D 500 mg(1,250mg) -200 unit per tablet [CALCIUM-VITAMIN D 500 MG(1,250MG) -200 UNIT PER TABLET] Take 1 tablet by mouth 2 (two) times a day with meals.     escitalopram (LEXAPRO) 10 MG tablet Take 10 mg by mouth daily     traZODone (DESYREL) 50 MG tablet [TRAZODONE (DESYREL) 50 MG TABLET] Take 1 tablet (50 mg total) by mouth at bedtime.       Medication Adherence:  Patient reports taking prescribed medications as prescribed.    Patient Allergies:  No Known Allergies    Medical History:  No past medical history on file.      Current Mental Status Exam:   Appearance:  Appropriate    Eye Contact:  Good   Psychomotor:  Normal       Gait / station:  no problem  Attitude / Demeanor: Cooperative   Speech      Rate / Production: Normal/ Responsive      Volume:  Normal  volume      Language:  intact  Mood:   Anxious   Affect:   Tearful   Thought Content: Clear   Thought Process: Coherent       Associations: No loosening of associations  Insight:   Good   Judgment:  Intact   Orientation:  All  Attention/concentration: Good    Rating Scales:    PHQ9:    PHQ-9 SCORE 2/5/2021 7/15/2021 8/27/2021   PHQ-9 Total Score 22 22 25   ;    GAD7:    BRISA-7 SCORE 2/5/2021 7/15/2021 8/27/2021   Total Score 15 14 20     CGI:     First:Considering your total clinical experience with this particular patient population, how severe are the patient's symptoms at this time?: 5 (8/27/2021  1:00 PM)  ;    Most recentCompared to the patient's condition at the START of treatment, this patient's condition is: 4 (8/27/2021  1:00 PM)      Substance Use:  Patient did report a family history of substance use concerns; see medical history section for details.  Patient has not received chemical dependency treatment in the past.  Patient has not ever been to detox.      Patient is not currently  receiving any chemical dependency treatment. Patient reported the following problems as a result of their substance use:  None.    Patient reports using alcohol 1 times per month and has 1 beers, glasses of wine and mixed drinks at a time. Patient first started drinking at age 21.  Patient reported date of last use was 08/17/2021.  Patient reports heaviest use was In college.  Patient denies using tobacco.  Patient denies using cannabis.  Patient reports using caffeine 1 times per day and drinks 1 at a time. Patient started using caffeine at age 18.  Patient reports using/abusing the following substance(s). Patient reported no other substance use.     CAGE- AID:    CAGE-AID Total Score 8/27/2021   Total Score 0       Substance Use: No symptoms    Based on the positive CAGE score and clinical interview there  are not indications of drug or alcohol abuse.    Significant Losses / Trauma / Abuse / Neglect Issues:   Patient   did not serve in the .  There are indications or report of significant loss, trauma, abuse or neglect issues related to: client's experience of emotional abuse .  Concerns for possible neglect are not present.     Safety Assessment:   Current Safety Concerns:  [place] Suicide Severity Rating Scale (Lifetime/Recent)  [place] Suicide Severity Rating (Lifetime/Recent) 8/27/2021   1. Wish to be Dead (Lifetime) Yes   1. Wish to be Dead (Recent) Yes   2. Non-Specific Active Suicidal Thoughts (Lifetime) Yes   2. Non-Specific Active Suicidal Thoughts (Recent) Yes   3. Active Suicidal Ideation with any Methods (Not Plan) Without Intent to Act (Lifetime) Yes   3. Active Suicidal Ideation with any Methods (Not Plan) Without Intent to Act (Recent) Yes   4. Active Suicidal Ideation with Some Intent to Act, Without Specific Plan (Lifetime) Yes   4. Active Suicidal Ideation with Some Intent to Act, Without Specific Plan (Recent) No   5. Active Suicidal Ideation with Specific Plan and Intent (Lifetime) No    5. Active Suicidal Ideation with Specific Plan and Intent (Recent) No   Actual Attempt (Lifetime) No   Actual Attempt (Past 3 Months) No   Has subject engaged in non-suicidal self-injurious behavior? (Lifetime) Yes   Has subject engaged in non-suicidal self-injurious behavior? (Past 3 Months) No   Interrupted Attempts (Lifetime) No   Interrupted Attempts (Past 3 Months) No   Aborted or Self-Interrupted Attempt (Lifetime) No   Aborted or Self-Interrupted Attempt (Past 3 Months) No   Preparatory Acts or Behavior (Lifetime) No   Preparatory Acts or Behavior (Past 3 Months) No     Patient denies current homicidal ideation and behaviors.  Patient denies current self-injurious ideation and behaviors.    Patient denied risk behaviors associated with substance use.  Patient reported impulsive/compulsive spending behaviors reported impulsive decisionmaking associated with mental health symptoms.  Patient reports the following current concerns for their personal safety: None.  Patient reports there   no firearms in the house.       None.    History of Safety Concerns:  Patient denied a history of homicidal ideation.     Patient denied a history of personal safety concerns.    Patient denied a history of assaultive behaviors.    Patient denied a history of sexual assault behaviors.     Patient denied a history of risk behaviors associated with substance use.  Patient reported a history of impulsive decision making associated with mental health symptoms.  Patient reports the following protective factors:   Patient reports the following protective factors: living with other people and positive social skills       Risk Plan:  See Recommendations for Safety and Risk Management Plan    Review of Symptoms per patient report:  Depression: Change in sleep, Lack of interest, Excessive or inappropriate guilt, Change in energy level, Difficulties concentrating, Change in appetite, Suicidal ideation, Feelings of hopelessness, Feelings  of helplessness, Low self-worth, Ruminations, Irritability, Feeling sad, down, or depressed, Withdrawn and Frequent crying  Linda:  Irritability, Racing thoughts, Restlessness, Distractibility and Impulsiveness  Psychosis: No Symptoms  Anxiety: Excessive worry, Nervousness, Physical complaints, such as headaches, stomachaches, muscle tension, Separation anxiety, Social anxiety, Sleep disturbance, Ruminations, Poor concentration and Anger outbursts  Panic:  No symptoms  Post Traumatic Stress Disorder:  Avoids traumatic stimuli   Eating Disorder: No Symptoms  ADD / ADHD:  Inattentive, Difficulties listening, Poor task completion, Poor organizational skills, Distractibility, Forgetful, Intrudes, Impulsive, Restlessness/fidgety and Hyperactive  Conduct Disorder: Lies  Autism Spectrum Disorder: No symptoms  Obsessive Compulsive Disorder: Washing    Patient reports the following compulsive behaviors and treatment history: Picking - has not had treatment..      Diagnostic Criteria:    - Restlessness or feeling keyed up or on edge.    - Being easily fatigued.    - Difficulty concentrating or mind going blank.    - Irritability.    - Sleep disturbance (difficulty falling or staying asleep, or restless unsatisfying sleep).   E. The anxiety, worry, or physical symptoms cause clinically significant distress or impairment in social, occupational, or other important areas of functioning.    - Depressed mood. Note: In children and adolescents, can be irritable mood.     - Diminished interest or pleasure in all, or almost all, activities.    - Fatigue or loss of energy.    - Feelings of worthlessness or inappropriate and excessive guilt.    - Diminished ability to think or concentrate, or indecisiveness.    - Recurrent thoughts of death (not just fear of dying), recurrent suicidal ideation without a specific plan, or a suicide attempt or a specific plan for committing suicide.   B) The symptoms cause clinically significant distress  or impairment in social, occupational, or other important areas of functioning    Functional Status:  Patient reports the following functional impairments: work / vocational responsibilities.     WHODAS:   WHODAS 2.0 Total Score 8/27/2021   Total Score 34     Programmatic care:  Current LOCUS was assessed and patient needs the following level of care based on score Day Treatment  .    Clinical Summary:  1. Reason for assessment: Additional support  .  2. Psychosocial, Cultural and Contextual Factors: None  .  3. Principal DSM5 Diagnoses  (Sustained by DSM5 Criteria Listed Above):   296.32 (F33.1) Major Depressive Disorder, Recurrent Episode, Moderate _.  4. Other Diagnoses that is relevant to services:   300.02 (F41.1) Generalized Anxiety Disorder.  5. Provisional Diagnosis: None  6. Prognosis: Expect Improvement.  7. Likely consequences of symptoms if not treated: If untreated, patient's mental health will likely deteriorate and may require a higher level of care.  8. Client strengths include:  has a previous history of therapy .     Recommendations:     1. Plan for Safety and Risk Management:   A safety and risk management plan has been developed including: Patient consented to co-developed safety plan.  Safety and risk management plan was completed.  Patient agreed to use safety plan should any safety concerns arise.  A copy was given to the patient..          Report to child / adult protection services was NA.     2. Patient's identified none.     3. Initial Treatment will focus on:    Depressed Mood   Anxiety .     4. Resources/Service Plan:    services are not indicated.   Modifications to assist communication are not indicated.   Additional disability accommodations are not indicated.      5. Collaboration:   Collaboration / coordination of treatment will be initiated with the following  support professionals: None.      6.  Referrals:   The following referral(s) will be initiated: Day Treatment.  Next Scheduled Appointment: 2021.     A Release of Information has been obtained for the following: None.    7. MARILYN:    MARILYN:  Discussed the general effects of drugs and alcohol on health and well-being. Provider gave patient printed information about the effects of chemical use on their health and well being. Recommendations:  None .     8. Records:   These were reviewed at time of assessment.   Information in this assessment was obtained from the medical record and  provided by patient who is a good historian.    Patient will have open access to their mental health medical record.      Provider Name/ Credentials:  MARIA C Narayan, RAKESH    2021                                    LOCUS Worksheet     Name: Nimisha Jasso MRN: [id number]    : 1995      Gender:  female    PMI:     Provider Name: Lory   Provider NPI:  [id number]    Actual level of Care Provided:  Therapy     Service(s) receiving or referred to:  Day Treatment    Reason for Variance:  For symptom management due to worsening mental health symptoms and passive suicidal ideation.        Rating completed by: MARIA C Narayan, RAKESH       I. Risk of Harm:   2      Low Risk of Harm    II. Functional Status:   2      Mild Impairment    III. Co-Morbidity:   1      No Co-Morbidity    IV - A. Recovery Environment - Level of Stress:   3      Moderately Stress Environment    IV - B. Recovery Environment - Level of Support:   3      Limited Support in Environment    V. Treatment and Recovery History:   3      Moderate to Equivocal Response to Treatment and Recovery Management    VI. Engagement and Recovery Project:   3      Limited Engagement and Recovery       17 Composite Score    Level of Care Recommendation:   17 to 19       High Intensity Community Based Services                  Outpatient Mental Health Services - Adult    MY COPING PLAN FOR SAFETY    PATIENT'S NAME: Nimisha Jasso  MRN:   [id number]    SAFETY  "PLAN:    Step 1: Warning signs / cues (Thoughts, images, mood, situation, behavior) that a crisis may be developing:      Thoughts: \"I don't matter\", \"People would be better off without me\" and \"I'm a burden\"    Images: obsessive thoughts of death or dying: someone harming me and visions of harm:     Thinking Processes: ruminations (can't stop thinking about my problems), racing thoughts, intrusive thoughts (bothersome, unwanted thoughts that come out of nowhere), highly critical and negative thoughts and disorganized thinking.    Mood: worsening depression, hopelessness and helplessness    Behaviors: isolating/withdrawing  and can't stop crying    Situations: None       Step 2: Coping strategies - Things I can do to take my mind off of my problems without contacting another person (relaxation technique, physical activity):      Distress Tolerance Strategies:  arts and crafts: Draw    Physical Activities: go for a walk    Focus on helpful thoughts:  \"This is temporary\", \"I will get through this\" and \"It always passes\"    Step 3: People and social settings that provide distraction:     Roommates   watch tv, play dungeons and dragoons      Step 4: Remind myself of people and things that are important to me and worth living for:  Mother and friends    Step 5: When I am in crisis, I can ask these people to help me use my safety plan:     Gisselle Jasso (mother) [phone]    Step 6: Making the environment safe:       remove alcohol, remove drugs and be around others    Step 7: Professionals or agencies I can contact during a crisis:      Suicide Prevention Lifeline: 3-263-341-OFSQ (7251)    Crisis Text Line Service (available 24 hours a day, 7 days a week): Text [place] to 517613    Call  **CRISIS (097771) from a cell phone to talk to a team of professionals who can help you.    Crisis Services By [facility]: Phone Number:   [place]     [phone]   [address]    [phone]   [place]    [phone]   [place]    [phone] "   [place]    [phone]   [place] [phone]   [place]     [phone]       Call 911 or go to my nearest emergency department.     I helped develop this safety plan and agree to use it when needed.  I have been given a copy of this plan.        Today s date:  8/27/2021  Adapted from Safety Plan Template 2008 Yajaira Roque and Shahab Pugh is reprinted with the express permission of the authors.  No portion of the Safety Plan Template may be reproduced without the express, written permission.  You can contact the authors at [email or web address] or [email or web address]

## 2021-08-27 NOTE — TELEPHONE ENCOUNTER
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  Answered all program questions. Sent Welcome Letter through [employer].  Will inform team of the new start.

## 2021-08-28 ASSESSMENT — ANXIETY QUESTIONNAIRES: GAD7 TOTAL SCORE: 20

## 2021-08-30 ENCOUNTER — TELEPHONE (OUTPATIENT)
Dept: BEHAVIORAL HEALTH | Facility: CLINIC | Age: 26
End: 2021-08-30

## 2021-08-30 ENCOUNTER — HOSPITAL ENCOUNTER (OUTPATIENT)
Dept: BEHAVIORAL HEALTH | Facility: CLINIC | Age: 26
End: 2021-08-30
Attending: PSYCHIATRY & NEUROLOGY
Payer: COMMERCIAL

## 2021-08-30 PROBLEM — F33.1 MAJOR DEPRESSIVE DISORDER, RECURRENT EPISODE, MODERATE (H): Status: ACTIVE | Noted: 2021-08-30

## 2021-08-30 PROCEDURE — G0177 OPPS/PHP; TRAIN & EDUC SERV: HCPCS | Mod: 95

## 2021-08-30 PROCEDURE — 90853 GROUP PSYCHOTHERAPY: CPT | Mod: 95

## 2021-08-30 NOTE — TELEPHONE ENCOUNTER
RN Review of Medical History / Physical Health Screen  Outpatient Mental Health Programs - [facility] Mental Health Day Treatment    PATIENT'S NAME: Nimisha Jasso  MRN:   [id number]  :   1995  ACCT. NUMBER: [id number]  CURRENT AGE:  26 year old    DATE OF DIAGNOSTIC ASSESSMENT: 21  DATE OF ADMISSION: 21     Please see Diagnostic Assessment for additional Medical History.     General Health:   Have you had any exposure to any communicable disease in the past 2-3 weeks? no     Are you aware of safe sex practices? yes   Do you have a history of seizures?     If so, do you have a seizure plan? Known triggers?     Notify patient that we will call 911 (if virtual) or a code (if in-person), if we were to witness seizure during group. no          yes     Nutrition:    Are you on a special diet? If yes, please explain:  no   Do you have any concerns regarding your nutritional status? If yes, please explain:  no   Have you had any appetite changes in the last 3 months?  Yes, Mental health related     Have you had any weight loss or weight gain in the last 3 months?  No     Do you have a history of an eating disorder? no   Do you have a history of being in an eating disorder program? no     Patient height and weight recorded by RN in epic flowsheet: no      BMI Review:  No; Unable to measure  Because of temporary in-person programmatic suspension due to COVID-19 pandemic, all pt weights and heights will be collected through patient self-report an recorded in physical health screening progress note upon admission to the program.                            Height/Weight Review:  Patient reported height:  5'5      Patient reports weight:  Date last checked:  145lbs     Any referrals/needs identified?  None            Fall Risk:   Have you had any falls in the past 3 months? no     Do you currently use any assistive devices for mobility?   no      Additional Comments/Assessment:  None    Per completion of the Medical History / Physical Health Screen, is there a recommendation to see / follow up with a primary care physician/clinic or dentist?    No.      Daniel Sanchez RN  8/30/2021

## 2021-08-30 NOTE — GROUP NOTE
Psychoeducation Group Note    PATIENT'S NAME: Nimisha Jasso  MRN:   [id number]  :   1995  ACCT. NUMBER: [id number]  DATE OF SERVICE: 21  START TIME: 10:00 AM  END TIME: 10:50 AM  FACILITATOR: Daniel Sanchez, RN; Tahira Lam RN  TOPIC: MH RN Group: Mental Health Maintenance                                    Service Modality:  Video Visit     Telemedicine Visit: The patient's condition can be safely assessed and treated via synchronous audio and visual telemedicine encounter.      Reason for Telemedicine Visit:  Covid19    Originating Site (Patient Location): Patient's home    Distant Site (Provider Location): Provider Remote Setting- Home Office    Consent:  The patient/guardian has verbally consented to: the potential risks and benefits of telemedicine (video visit) versus in person care; bill my insurance or make self-payment for services provided; and responsibility for payment of non-covered services.     Patient would like the video invitation sent by:  My Chart    Mode of Communication:  Video Conference via Medical Zoom    As the provider I attest to compliance with applicable laws and regulations related to telemedicine.        [facility] Adult Mental Health Day Treatment  TRACK: 5a combined    NUMBER OF PARTICIPANTS: 4    Summary of Group / Topics Discussed:  Mental Health Maintenance:  Stigma: In this group patients explored stigma surrounding a mental health diagnosis.  The group discussed the way stigma impacts their own life, and discussed strategies to reduce. The relationship between physical and mental health were also explored in the context of healthcare access, treatment, and support.    Patient Session Goals / Objectives:  ? Patients identified the importance of practicing emotional hygiene  ? Patients identified ways to decrease the  impact of stigma in their own life          Patient Participation / Response:  Moderately participated, sharing some personal  reflections / insights and adequately adequately received / provided feedback with other participants.    Identified / Expressed personal readiness to practice skills    Treatment Plan:  Patient has an initial individualized treatment plan that was created as part of their diagnostic assessment / admission process.  A master individualized treatment plan is in the process of being developed with the patient and multi-disciplinary care team.    Daniel Sanchez RN

## 2021-08-31 ENCOUNTER — HOSPITAL ENCOUNTER (OUTPATIENT)
Dept: BEHAVIORAL HEALTH | Facility: CLINIC | Age: 26
End: 2021-08-31
Attending: PSYCHIATRY & NEUROLOGY
Payer: COMMERCIAL

## 2021-08-31 PROCEDURE — G0177 OPPS/PHP; TRAIN & EDUC SERV: HCPCS | Mod: GT

## 2021-08-31 PROCEDURE — 90853 GROUP PSYCHOTHERAPY: CPT | Mod: GT | Performed by: SOCIAL WORKER

## 2021-08-31 PROCEDURE — 90853 GROUP PSYCHOTHERAPY: CPT | Mod: 95 | Performed by: SOCIAL WORKER

## 2021-08-31 NOTE — GROUP NOTE
Psychotherapy Group Note    PATIENT'S NAME: Nimisha Jasso, prefers to be called Keith  MRN:   [id number]  :   1995  ACCT. NUMBER: [id number]  DATE OF SERVICE: 21  START TIME: 10:00 AM  END TIME: 10:50 AM  FACILITATOR: Sabina Peterson [facility]  TOPIC:  EBP Group: Self-Awareness  [facility] Mental Health Day Treatment  TRACK: 5A    NUMBER OF PARTICIPANTS: 5    Summary of Group / Topics Discussed:  Self-Awareness: Values: Patients identified personal values by examining development of their current values and how their values influence their daily functioning and life choices. Patients explored the impact of their values on their thoughts, feelings, and actions. Patients discussed definition of personal values and how they develop and change over time. The goal is to help patients reconcile value conflicts and achieve balance and flexibility to improve mood and daily functioning.     Patient Session Goals / Objectives:    Examined development of values and impact of values on functioning    Identified and prioritized important values related to current well-being     Identified strategies to change or enhance values to positively impact symptoms    Assisted patients to find ways to adapt functioning to better fit their values                                    Service Modality:  Video Visit     Telemedicine Visit: The patient's condition can be safely assessed and treated via synchronous audio and visual telemedicine encounter.      Reason for Telemedicine Visit: Services only offered telehealth    Originating Site (Patient Location): Patient's home    Distant Site (Provider Location): Provider Remote Setting- Home Office    Consent:  The patient/guardian has verbally consented to: the potential risks and benefits of telemedicine (video visit) versus in person care; bill my insurance or make self-payment for services provided; and responsibility for payment of non-covered services.      Patient would like the video invitation sent by:  My Chart    Mode of Communication:  Video Conference via Medical Zoom    As the provider I attest to compliance with applicable laws and regulations related to telemedicine.           Patient Participation / Response:  Fully participated with the group by sharing personal reflections / insights and openly received / provided feedback with other participants.    Demonstrated understanding of values, strengths, and challenges to learn about themselves    Treatment Plan:  Patient has an initial individualized treatment plan that was created as part of their diagnostic assessment / admission process.  A master individualized treatment plan is in the process of being developed with the patient and multi-disciplinary care team.    Sabina Peterson, JAMESSW

## 2021-08-31 NOTE — GROUP NOTE
Process Group Note    PATIENT'S NAME: Nimisha Jasso, prefers to be called Keith  MRN:   [id number]  :   1995  ACCT. NUMBER: [id number]  DATE OF SERVICE: 21   START TIME:  9:00 AM  END TIME:  9:50 AM  FACILITATOR: Sabina Peterson [facility]  TOPIC:  Process Group    Diagnoses:  296.32 (F33.1) Major Depressive Disorder, Recurrent Episode, Moderate  300.02 (F41.1) Generalized Anxiety Disorder      [facility] Mental Health Day Treatment  TRACK: 5A    NUMBER OF PARTICIPANTS: 5                                      Service Modality:  Video Visit     Telemedicine Visit: The patient's condition can be safely assessed and treated via synchronous audio and visual telemedicine encounter.      Reason for Telemedicine Visit: Services only offered telehealth    Originating Site (Patient Location): Patient's home    Distant Site (Provider Location): Provider Remote Setting- Home Office    Consent:  The patient/guardian has verbally consented to: the potential risks and benefits of telemedicine (video visit) versus in person care; bill my insurance or make self-payment for services provided; and responsibility for payment of non-covered services.     Patient would like the video invitation sent by:  My Chart    Mode of Communication:  Video Conference via Medical Zoom    As the provider I attest to compliance with applicable laws and regulations related to telemedicine.               Data:    Session content: At the start of this group, patients were invited to check in by identifying themselves, describing their current emotional status, and identifying issues to address in this group.   Area(s) of treatment focus addressed in this session included Symptom Management, Personal Safety and Community Resources/Discharge Planning.  Keith reported that they are having a high level of anxiety.   They shared that the anxiety started in high school but has been worse for the past few months.  They stated  that they notic spiraling thoughts,m difficulty eating, increased daytime sleep as avoidance, difficulty falling asleep due to anxiety, poor concentration, some shortness of breath, and difficulty focusing.  They are trying the program to avoid needing inpatient care.   They have support from their therapist and a could of friends.  They reported sleep disturbance.  Client did not endorse suicidal ideation, intent, or plan.They discussed a goal of attending the program to work on decreasing social isolation.      Therapeutic Interventions/Treatment Strategies:  Psychotherapist offered support, feedback and validation and reinforced use of skills. Treatment modalities used include Cognitive Behavioral Therapy. Interventions include Cognitive Restructuring:  Assisted patient in formulating new neutral/positive alternatives to challenge less helpful / ineffective thoughts.    Assessment:    Patient response:   Patient responded to session by listening, focusing on goals and being attentive    Possible barriers to participation / learning include: and no barriers identified    Health Issues:   None reported       Substance Use Review:   Substance Use: No active concerns identified.    Mental Status/Behavioral Observations  Appearance:   Appropriate   Eye Contact:   Good   Psychomotor Behavior: Normal   Attitude:   Cooperative   Orientation:   All  Speech   Rate / Production: Normal    Volume:  Normal   Mood:    Anxious  Depressed   Affect:    Appropriate   Thought Content:   Clear  Thought Form:  Coherent  Logical     Insight:    Good     Plan:     Safety Plan: No current safety concerns identified.  Recommended that patient call 911 or go to the local ED should there be a change in any of these risk factors.     Barriers to treatment: None identified    Patient Contracts (see media tab):  None    Substance Use: Not addressed in session     Continue or Discharge: Patient will continue in Adult Day Treatment (ADT)  as  planned. Patient is likely to benefit from learning and using skills as they work toward the goals identified in their treatment plan.      Sabina Peterson, [facility]  August 31, 2021

## 2021-08-31 NOTE — GROUP NOTE
Psychoeducation Group Note    PATIENT'S NAME: Nimisha Jasso  MRN:   [id number]  :   1995  ACCT. NUMBER: [id number]  DATE OF SERVICE: 21  START TIME: 11:00 AM  END TIME: 11:50 AM  FACILITATOR: Nehemiah Freeman OTR/L  TOPIC: MH Life Skills Group: Resiliency Development                                    Service Modality:  Video Visit     Telemedicine Visit: The patient's condition can be safely assessed and treated via synchronous audio and visual telemedicine encounter.      Reason for Telemedicine Visit: Services only offered telehealth    Originating Site (Patient Location): Patient's home    Distant Site (Provider Location): Provider Remote Setting- Home Office    Consent:  The patient/guardian has verbally consented to: the potential risks and benefits of telemedicine (video visit) versus in person care; bill my insurance or make self-payment for services provided; and responsibility for payment of non-covered services.     Mode of Communication:  Video Conference via Medical Zoom    As the provider I attest to compliance with applicable laws and regulations related to telemedicine.       [facility] Mental Health Day Treatment  TRACK: 5A    NUMBER OF PARTICIPANTS: 4    Summary of Group / Topics Discussed:  Resiliency Development:  Coping Skills(Healthy Lifestyle): Patients were taught how to identify stressors, signs of stress, coping skills, and prevention strategies for overall stress management.  Patients were given the opportunity to identify both ongoing and acute mental health symptoms and how to effectively manage these symptoms by developing an effective aftercare plan.  Patients increased awareness of community based resources.    Patient Session Goals / Objectives:    Identified how using coping skills can be used for symptom and stress management       Improved awareness of individualed symptoms and stressors and how to effectively cope     Established a relapse  prevention plan to practice these skills in their own environments    Practiced and reflected on how to generalize taught skills to their everyday life          Patient Participation / Response:  Fully participated with the group by sharing personal reflections / insights and openly received / provided feedback with other participants.    Demonstrated understanding of content through handout/video/group discussion , Verbalized understanding of content and Patient would benefit from additional opportunities to practice the content to be able to generalize it to their everyday life with increased intentionality, consistency, and efficacy in support of their psychiatric recovery    Treatment Plan:  Patient has a current master individualized treatment plan.  See Epic treatment plan for more information.    Nehemiah Freeman, OTR/L

## 2021-09-02 ENCOUNTER — HOSPITAL ENCOUNTER (OUTPATIENT)
Dept: BEHAVIORAL HEALTH | Facility: CLINIC | Age: 26
End: 2021-09-02
Attending: PSYCHIATRY & NEUROLOGY
Payer: COMMERCIAL

## 2021-09-02 PROCEDURE — 90853 GROUP PSYCHOTHERAPY: CPT | Mod: 95 | Performed by: SOCIAL WORKER

## 2021-09-02 PROCEDURE — 90853 GROUP PSYCHOTHERAPY: CPT | Mod: 95

## 2021-09-02 PROCEDURE — G0177 OPPS/PHP; TRAIN & EDUC SERV: HCPCS | Mod: 95

## 2021-09-02 NOTE — GROUP NOTE
"Process Group Note    PATIENT'S NAME: Nimisha Jasso  MRN:   [id number]  :   1995  ACCT. NUMBER: [id number]  DATE OF SERVICE: 21  START TIME:  9:00 AM  END TIME:  9:50 AM  FACILITATOR: Maria Antonia Adkins LP; Sabina Peterson LIC  TOPIC:  Process Group    Diagnoses:  296.32 (F33.1) Major Depressive Disorder, Recurrent Episode, Moderate  300.02 (F41.1) Generalized Anxiety Disorder    [facility] Mental Health Day Treatment  TRACK: 5A    NUMBER OF PARTICIPANTS: 5                                      Service Modality:  Video Visit     Telemedicine Visit: The patient's condition can be safely assessed and treated via synchronous audio and visual telemedicine encounter.      Reason for Telemedicine Visit: Services only offered telehealth    Originating Site (Patient Location): Patient's home    Distant Site (Provider Location): Provider Remote Setting- Home Office    Consent:  The patient/guardian has verbally consented to: the potential risks and benefits of telemedicine (video visit) versus in person care; bill my insurance or make self-payment for services provided; and responsibility for payment of non-covered services.     Patient would like the video invitation sent by:  My Chart    Mode of Communication:  Video Conference via Medical Zoom    As the provider I attest to compliance with applicable laws and regulations related to telemedicine.               Data:    Session content: At the start of this group, patients were invited to check in by identifying themselves, describing their current emotional status, and identifying issues to address in this group.   Area(s) of treatment focus addressed in this session included Symptom Management, Personal Safety and Community Resources/Discharge Planning.    Patient reports  doing fine  and having some episodes of  waking up\" during the night. 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 They report some trouble with sleep but having some improvement with this ongoing symptom. Patient reports using medications to help sleep but it has not been as effective as the sleep bhaskar they are utilizing. No safety concerns were reported by patient. Patient declines additional processing time.    Therapeutic Interventions/Treatment Strategies:  Psychotherapist offered support, feedback and validation and reinforced use of skills. Treatment modalities used include Motivational Interviewing and Cognitive Behavioral Therapy. Interventions include Behavioral Activation: Encouraged strategies to reduce individual procrastination and increase motivation by increasing goal-directed activities to enhance mood and reduce symptoms. and Symptoms Management: Promoted understanding of their diagnoses and how it impacts their functioning.    Assessment:    Patient response:   Patient responded to session by accepting feedback, listening, being attentive, accepting support and verbalizing understanding    Possible barriers to participation / learning include: feeling tired     Health Issues:   None reported       Substance Use Review:   Substance Use: No active concerns identified.    Mental Status/Behavioral Observations  Appearance:   Appropriate   Eye Contact:   Good   Psychomotor Behavior: Normal   Attitude:   Cooperative   Orientation:   All  Speech   Rate / Production: Normal/ Responsive Normal    Volume:  Normal   Mood:    Normal  Affect:    Appropriate   Thought Content:   Clear and Safety denies any current safety concerns including suicidal ideation, self-harm, and homicidal ideation  Thought Form:  Coherent  Logical     Insight:    Good     Plan:     Safety Plan: No current safety concerns identified.  Recommended that patient call 911 or go to the local ED should there be a change in  any of these risk factors.     Barriers to treatment: None identified    Patient Contracts (see media tab):  None    Substance Use: Provided encouragement towards sobriety     Continue or Discharge: Patient will continue in Adult Day Treatment (ADT)  as planned. Patient is likely to benefit from learning and using skills as they work toward the goals identified in their treatment plan.      Maria nAtonia Adkins, BRAND[facility] September 2, 2021

## 2021-09-02 NOTE — GROUP NOTE
Psychoeducation Group Note    PATIENT'S NAME: Nimisha Jasso  MRN:   [id number]  :   1995  ACCT. NUMBER: [id number]  DATE OF SERVICE: 21  START TIME: 11:00 AM  END TIME: 11:50 AM  FACILITATOR: Tahira Lam, RN; Daniel Sanchez RN  TOPIC:  RN Group: Mental Health Maintenance                                    Service Modality:  Video Visit     Telemedicine Visit: The patient's condition can be safely assessed and treated via synchronous audio and visual telemedicine encounter.      Reason for Telemedicine Visit:  covid19    Originating Site (Patient Location): Patient's home    Distant Site (Provider Location): Provider Remote Setting- Home Office    Consent:  The patient/guardian has verbally consented to: the potential risks and benefits of telemedicine (video visit) versus in person care; bill my insurance or make self-payment for services provided; and responsibility for payment of non-covered services.     Patient would like the video invitation sent by:  My Chart    Mode of Communication:  Video Conference via Medical Zoom    As the provider I attest to compliance with applicable laws and regulations related to telemedicine.          [facility] Adult Mental Health Day Treatment  TRACK: 5A    NUMBER OF PARTICIPANTS: 3    Summary of Group / Topics Discussed:  Mental Health Maintenance:  Stigma: In this group patients explored stigma surrounding a mental health diagnosis.  The group discussed the way stigma impacts their own life, and discussed strategies to reduce. The relationship between physical and mental health were also explored in the context of healthcare access, treatment, and support.    Patient Session Goals / Objectives:  ? Patients identified the importance of practicing emotional hygiene  ? Patients identified ways to decrease the  impact of stigma in their own life          Patient Participation / Response:  Minimally participated, only when prompted /  asked.    Demonstrated understanding of topics discussed through group discussion and participation and Identified / Expressed personal readiness to practice skills    Treatment Plan:  Patient has an initial individualized treatment plan that was created as part of their diagnostic assessment / admission process.  A master individualized treatment plan is in the process of being developed with the patient and multi-disciplinary care team.    Tahira Lam RN

## 2021-09-02 NOTE — GROUP NOTE
Psychotherapy Group Note    PATIENT'S NAME: Nimisha Jasso, prefers to be called Keith  MRN:   [id number]  :   1995  ACCT. NUMBER: [id number]  DATE OF SERVICE: 21  START TIME: 10:00 AM  END TIME: 10:50 AM  FACILITATOR: Sabina Peterson [facility]  TOPIC:  EBP Group: Self-Awareness  [facility] Adult Mental Health Day Treatment  TRACK: 5A    NUMBER OF PARTICIPANTS: 4    Summary of Group / Topics Discussed:  Self-Awareness: Personal Strengths: Topic focused on assisting patients in identifying personal strengths and how they relate to the management of mental health symptoms. Patients discussed the benefits of acknowledging their personal strengths and their impact on mood improvement, mindfulness, and perspective. Patients worked to increase time spent on recognition and appreciation of what is positive and working in their lives. The goal is to reduce rumination and negative thinking resulting in increased mindfulness and resilience. Patients will work to put skills into practice and problem-solve barriers.     Patient Session Goals / Objectives:    Identified personal strengths    Identified barriers to recognition of personal strengths    Verbalized understanding of strategies to increase use of their strengths in management of daily symptoms                                    Service Modality:  Video Visit     Telemedicine Visit: The patient's condition can be safely assessed and treated via synchronous audio and visual telemedicine encounter.      Reason for Telemedicine Visit: Services only offered telehealth    Originating Site (Patient Location): Patient's home    Distant Site (Provider Location): Provider Remote Setting- Home Office    Consent:  The patient/guardian has verbally consented to: the potential risks and benefits of telemedicine (video visit) versus in person care; bill my insurance or make self-payment for services provided; and responsibility for payment of non-covered  services.     Patient would like the video invitation sent by:  My Chart    Mode of Communication:  Video Conference via Medical Zoom    As the provider I attest to compliance with applicable laws and regulations related to telemedicine.           Patient Participation / Response:  Moderately participated, sharing some personal reflections / insights and adequately adequately received / provided feedback with other participants.    Demonstrated understanding of values, strengths, and challenges to learn about themselves    Treatment Plan:  Patient has an initial individualized treatment plan that was created as part of their diagnostic assessment / admission process.  A master individualized treatment plan is in the process of being developed with the patient and multi-disciplinary care team.    Sabina Peterson, JAMESSW

## 2021-09-08 ENCOUNTER — HOSPITAL ENCOUNTER (OUTPATIENT)
Dept: BEHAVIORAL HEALTH | Facility: CLINIC | Age: 26
End: 2021-09-08
Attending: PSYCHIATRY & NEUROLOGY
Payer: COMMERCIAL

## 2021-09-08 ENCOUNTER — TELEPHONE (OUTPATIENT)
Dept: BEHAVIORAL HEALTH | Facility: CLINIC | Age: 26
End: 2021-09-08

## 2021-09-08 PROCEDURE — 90853 GROUP PSYCHOTHERAPY: CPT | Mod: 95 | Performed by: COUNSELOR

## 2021-09-08 PROCEDURE — G0177 OPPS/PHP; TRAIN & EDUC SERV: HCPCS | Mod: GT

## 2021-09-08 NOTE — GROUP NOTE
"Process Group Note    PATIENT'S NAME: Nimisha Jasso  MRN:   [id number]  :   1995  ACCT. NUMBER: [id number]  DATE OF SERVICE: 21  START TIME:  2:00 PM  END TIME:  2:50 PM  FACILITATOR: Tyra Ricci [facility]  TOPIC:  Process Group    Diagnoses:  296.32 (F33.1) Major Depressive Disorder, Recurrent Episode, Moderate  300.02 (F41.1) Generalized Anxiety Disorder       [facility] Day Treatment  TRACK: 1B    NUMBER OF PARTICIPANTS: 7    Telemedicine Visit: The patient's condition can be safely assessed and treated via synchronous audio and visual telemedicine encounter.      Reason for Telemedicine Visit: Services only offered telehealth    Originating Site (Patient Location): Patient's home    Distant Site (Provider Location): Provider Remote Setting- Home Office    Consent:  The patient/guardian has verbally consented to: the potential risks and benefits of telemedicine (video visit) versus in person care; bill my insurance or make self-payment for services provided; and responsibility for payment of non-covered services.     Mode of Communication:  Video Conference via [employer]    As the provider I attest to compliance with applicable laws and regulations related to telemedicine.            Data:    Session content: At the start of this group, patients were invited to check in by identifying themselves, describing their current emotional status, and identifying issues to address in this group.   Area(s) of treatment focus addressed in this session included Symptom Management and Personal Safety.    Keith reported feeling \"okay\" today but having a lot of body anxiety.  Their goal for the day is to  their medication later today.  Patient declined additional process time but contributed to group discussion and provided feedback and support to peers.      Therapeutic Interventions/Treatment Strategies:  Psychotherapist offered support, feedback and validation and " reinforced use of skills.    Assessment:    Patient response:   Patient responded to session by accepting feedback, giving feedback and listening    Possible barriers to participation / learning include: and no barriers identified    Health Issues:   None reported       Substance Use Review:   Substance Use: No active concerns identified.    Mental Status/Behavioral Observations  Appearance:   Appropriate   Eye Contact:   Good   Psychomotor Behavior: Normal   Attitude:   Cooperative   Orientation:   All  Speech   Rate / Production: Normal    Volume:  Normal   Mood:    Anxious  Depressed   Affect:    Appropriate   Thought Content:   Clear  Thought Form:  Coherent  Logical     Insight:    Good     Plan:     Safety Plan: No current safety concerns identified.  Recommended that patient call 911 or go to the local ED should there be a change in any of these risk factors.     Barriers to treatment: None identified    Patient Contracts (see media tab):  None    Substance Use: Not addressed in session     Continue or Discharge: Patient will continue in Adult Day Treatment (ADT)  as planned. Patient is likely to benefit from learning and using skills as they work toward the goals identified in their treatment plan.      Tyra Ricci, [facility]  September 8, 2021

## 2021-09-08 NOTE — GROUP NOTE
Psychoeducation Group Note    PATIENT'S NAME: Nimisha Jasso  MRN:   [id number]  :   1995  ACCT. NUMBER: [id number]  DATE OF SERVICE: 21  START TIME:  1:00 PM  END TIME:  1:50 PM  FACILITATOR: Nehemiah Freeman OTR/L  TOPIC: MH Life Skills Group: Resiliency Development                                    Service Modality:  Video Visit     Telemedicine Visit: The patient's condition can be safely assessed and treated via synchronous audio and visual telemedicine encounter.      Reason for Telemedicine Visit: Services only offered telehealth    Originating Site (Patient Location): Patient's home    Distant Site (Provider Location): Provider Remote Setting- Home Office    Consent:  The patient/guardian has verbally consented to: the potential risks and benefits of telemedicine (video visit) versus in person care; bill my insurance or make self-payment for services provided; and responsibility for payment of non-covered services.     Mode of Communication:  Video Conference via [employer]    As the provider I attest to compliance with applicable laws and regulations related to telemedicine.       [facility] Mental Health Day Treatment  TRACK: 1B  NUMBER OF PARTICIPANTS: 7    Summary of Group / Topics Discussed:  Resiliency Development:  Coping Skills(Stress Management): Patients were taught how to identify stressors, signs of stress, coping skills, and prevention strategies for overall stress management.  Patients were given the opportunity to identify both ongoing and acute mental health symptoms and how to effectively manage these symptoms by developing an effective aftercare plan.  Patients increased awareness of community based resources.    Patient Session Goals / Objectives:    Identified how using coping skills can be used for symptom and stress management       Improved awareness of individualed symptoms and stressors and how to effectively cope     Established a relapse  prevention plan to practice these skills in their own environments    Practiced and reflected on how to generalize taught skills to their everyday life          Patient Participation / Response:  Fully participated with the group by sharing personal reflections / insights and openly received / provided feedback with other participants.    Demonstrated understanding of content through handout/video/group discussion , Verbalized understanding of content and Patient would benefit from additional opportunities to practice the content to be able to generalize it to their everyday life with increased intentionality, consistency, and efficacy in support of their psychiatric recovery    Treatment Plan:  Patient has a current master individualized treatment plan.  See Epic treatment plan for more information.    Nehemiah Freeman, OTR/L

## 2021-09-08 NOTE — ADDENDUM NOTE
Encounter addended by: Nehemiah Freeman, OTR/L on: 9/8/2021 5:04 PM   Actions taken: Pend clinical note

## 2021-09-08 NOTE — GROUP NOTE
Psychoeducation Group Note    PATIENT'S NAME: Nimisha Jasso  MRN:   [id number]  :   1995  ACCT. NUMBER: [id number]  DATE OF SERVICE: 21  START TIME:  3:00 PM  END TIME:  3:50 PM  FACILITATOR: Malgorzata Fox RN  TOPIC: MH RN Group: Brain Health  [facility] Adult Mental Health Day Treatment  TRACK: 1B    NUMBER OF PARTICIPANTS: 7    Summary of Group / Topics Discussed:  Brain Health:  Pathophysiology of stress and anxiety: Patients were educated on the difference between stress, chronic stress, and anxiety. The anatomy and pathophysiology of the body/brain were reviewed to illustrate the immediate effects of stress/anxiety in the body and the long term effects and increased risks for chronic disease that come from unmanaged stress/anxiety. Self-coping strategies to manage symptoms of stress were reviewed and pharmacologic, psychotherapeutic, and complementary treatment options were discussed.    Patient Session Goals / Objectives:  ? Described the differences between stress and anxiety and how the body responds to it  ? Listed the long term effects and increased risks for chronic disease that can arise from unmanaged stress/anxiety  ? Identified and described pharmacologic, psychotherapeutic, and complementary treatment options                                    Service Modality:  Video Visit     Telemedicine Visit: The patient's condition can be safely assessed and treated via synchronous audio and visual telemedicine encounter.      Reason for Telemedicine Visit: Services only offered telehealth    Originating Site (Patient Location): Patient's home    Distant Site (Provider Location): Provider Remote Setting- Home Office    Consent:  The patient/guardian has verbally consented to: the potential risks and benefits of telemedicine (video visit) versus in person care; bill my insurance or make self-payment for services provided; and responsibility for payment of non-covered services.      Patient would like the video invitation sent by:  My Chart    Mode of Communication:  Video Conference via Medical Zoom    As the provider I attest to compliance with applicable laws and regulations related to telemedicine.           Patient Participation / Response:  Fully participated with the group by sharing personal reflections / insights and openly received / provided feedback with other participants.    Verbalized understanding of brain health topic    Treatment Plan:  Patient has a current master individualized treatment plan.  See Epic treatment plan for more information.    Malgorzata Fox RN

## 2021-09-08 NOTE — PROGRESS NOTES
Acknowledgement of Current Treatment Plan       I have reviewed my treatment plan with my therapist on 9/8/21.   I agree with the plan as it is written in the electronic health record. (1B)    Name:      Signature:  Nimisha Jasso Unable to sign due to COVID and Virtual     Dr Russel Norris MD  Psychiatrist    Jose GARNICA/RENATO Fereman OTR/RENATO Ricci [facility], [facility]  Psychotherapist Tyra Ricci [facility] on 9/17/2021 at 12:45 PM

## 2021-09-09 ENCOUNTER — HOSPITAL ENCOUNTER (OUTPATIENT)
Dept: BEHAVIORAL HEALTH | Facility: CLINIC | Age: 26
End: 2021-09-09
Attending: PSYCHIATRY & NEUROLOGY
Payer: COMMERCIAL

## 2021-09-09 PROCEDURE — 90853 GROUP PSYCHOTHERAPY: CPT | Mod: 95 | Performed by: COUNSELOR

## 2021-09-09 PROCEDURE — 90853 GROUP PSYCHOTHERAPY: CPT | Mod: GT | Performed by: COUNSELOR

## 2021-09-09 PROCEDURE — G0177 OPPS/PHP; TRAIN & EDUC SERV: HCPCS | Mod: GT

## 2021-09-09 NOTE — GROUP NOTE
"Process Group Note    PATIENT'S NAME: Nimisha Jasso  MRN:   [id number]  :   1995  ACCT. NUMBER: [id number]  DATE OF SERVICE: 21  START TIME:  1:00 PM  END TIME:  1:50 PM  FACILITATOR: Tyra Ricci [facility]  TOPIC:  Process Group    Diagnoses:  296.32 (F33.1) Major Depressive Disorder, Recurrent Episode, Moderate  300.02 (F41.1) Generalized Anxiety Disorder      [facility] Day Treatment  TRACK: 1B    NUMBER OF PARTICIPANTS: 5                                      Service Modality:  Video Visit     Telemedicine Visit: The patient's condition can be safely assessed and treated via synchronous audio and visual telemedicine encounter.      Reason for Telemedicine Visit: Services only offered telehealth    Originating Site (Patient Location): Patient's home    Distant Site (Provider Location): Provider Remote Setting- Home Office    Consent:  The patient/guardian has verbally consented to: the potential risks and benefits of telemedicine (video visit) versus in person care; bill my insurance or make self-payment for services provided; and responsibility for payment of non-covered services.     Patient would like the video invitation sent by:  My Chart    Mode of Communication:  Video Conference via [employer]    As the provider I attest to compliance with applicable laws and regulations related to telemedicine.                Data:    Session content: At the start of this group, patients were invited to check in by identifying themselves, describing their current emotional status, and identifying issues to address in this group.   Area(s) of treatment focus addressed in this session included Symptom Management and Personal Safety.    Keith reported feeling \"stressed\" today because they slept in too late today and had restless sleep at night.  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  They plan to have a conversation with the director tonight to let him know what they are struggling with.      Therapeutic Interventions/Treatment Strategies:  Psychotherapist offered support, feedback and validation and reinforced use of skills. Treatment modalities used include Motivational Interviewing, Cognitive Behavioral Therapy and Dialectical Behavioral Therapy. Interventions include Symptoms Management: Promoted understanding of their diagnoses and how it impacts their functioning.    Assessment:    Patient response:   Patient responded to session by accepting feedback, giving feedback and listening    Possible barriers to participation / learning include: and no barriers identified    Health Issues:   None reported       Substance Use Review:   Substance Use: No active concerns identified.    Mental Status/Behavioral Observations  Appearance:   Appropriate   Eye Contact:   Good   Psychomotor Behavior: Normal   Attitude:   Cooperative   Orientation:   All  Speech   Rate / Production: Normal    Volume:  Normal   Mood:    Anxious  Depressed   Affect:    Appropriate   Thought Content:   Clear  Thought Form:  Coherent  Logical     Insight:    Good     Plan:     Safety Plan: No current safety concerns identified.  Recommended that patient call 911 or go to the local ED should there be a change in any of these risk factors.     Barriers to treatment: None identified    Patient Contracts (see media tab):  None    Substance Use: Not addressed in session     Continue or Discharge: Patient will continue in Adult Day Treatment (ADT)  as planned. Patient is likely to benefit from learning and using skills as they work toward the goals identified in their treatment plan.      Tyra Ricci, [facility]  September 9, 2021

## 2021-09-09 NOTE — GROUP NOTE
Psychotherapy Group Note    PATIENT'S NAME: Nimisha Jasso  MRN:   [id number]  :   1995  ACCT. NUMBER: [id number]  DATE OF SERVICE: 21  START TIME:  2:00 PM  END TIME:  2:50 PM  FACILITATOR: Tyra Ricci [facility]  TOPIC: MH EBP Group: Emotions Management  [facility] Adult Mental Health Day Treatment  TRACK: 1B    NUMBER OF PARTICIPANTS: 4                                      Service Modality:  Video Visit     Telemedicine Visit: The patient's condition can be safely assessed and treated via synchronous audio and visual telemedicine encounter.      Reason for Telemedicine Visit: Services only offered telehealth    Originating Site (Patient Location): Patient's home    Distant Site (Provider Location): Provider Remote Setting- Home Office    Consent:  The patient/guardian has verbally consented to: the potential risks and benefits of telemedicine (video visit) versus in person care; bill my insurance or make self-payment for services provided; and responsibility for payment of non-covered services.     Patient would like the video invitation sent by:  My Chart    Mode of Communication:  Video Conference via [employer]    As the provider I attest to compliance with applicable laws and regulations related to telemedicine.          Summary of Group / Topics Discussed:  Emotions Management: Understanding Emotions: Patients discussed the purpose of emotions and function they serve in our lives.  Reviewed core emotions, why they happen (triggers), and how they occur. The group assisted one anothers' understanding that: emotional experiences are important; difficult emotions have a place in our lives; and the differences between various emotions.    Patient Session Goals / Objectives:    Demonstrate understanding of types various emotions    Identify and discuss specific emotions and when they occur; understand triggers    Discuss barriers to emotional regulation      Patient Participation /  Response:  Fully participated with the group by sharing personal reflections / insights and openly received / provided feedback with other participants.    Demonstrated understanding of topics discussed through group discussion and participation, Expressed understanding of the relevance / importance of emotions management skills at distressing times in life, Self-aware of experiences with difficult emotions, and strategies to employ to manage them and Demonstrated knowledge of when to consider applying a variety of emotions management skills in daily life    Treatment Plan:  Patient has a current master individualized treatment plan.  See Epic treatment plan for more information.    [facility] Ricci, [facility]

## 2021-09-09 NOTE — GROUP NOTE
Psychoeducation Group Note    PATIENT'S NAME: Nimisha Jasso  MRN:   [id number]  :   1995  ACCT. NUMBER: [id number]  DATE OF SERVICE: 21  START TIME:  3:00 PM  END TIME:  3:50 PM  FACILITATOR: Malgorzata Fox RN  TOPIC: MH RN Group: Brain Health  [facility] Mental Health Day Treatment  TRACK: 1b    NUMBER OF PARTICIPANTS: 4    Summary of Group / Topics Discussed:  Brain Health:  Pathophysiology of stress and anxiety: Patients were educated on the difference between stress, chronic stress, and anxiety. The anatomy and pathophysiology of the body/brain were reviewed to illustrate the immediate effects of stress/anxiety in the body and the long term effects and increased risks for chronic disease that come from unmanaged stress/anxiety. Self-coping strategies to manage symptoms of stress were reviewed and pharmacologic, psychotherapeutic, and complementary treatment options were discussed.    Patient Session Goals / Objectives:  ? Described the differences between stress and anxiety and how the body responds to it  ? Listed the long term effects and increased risks for chronic disease that can arise from unmanaged stress/anxiety  ? Identified and described pharmacologic, psychotherapeutic, and complementary treatment options                                    Service Modality:  Video Visit     Telemedicine Visit: The patient's condition can be safely assessed and treated via synchronous audio and visual telemedicine encounter.      Reason for Telemedicine Visit: Services only offered telehealth    Originating Site (Patient Location): Patient's home    Distant Site (Provider Location): Provider Remote Setting- Home Office    Consent:  The patient/guardian has verbally consented to: the potential risks and benefits of telemedicine (video visit) versus in person care; bill my insurance or make self-payment for services provided; and responsibility for payment of non-covered services.      Patient would like the video invitation sent by:  My Chart    Mode of Communication:  Video Conference via [employer]    As the provider I attest to compliance with applicable laws and regulations related to telemedicine.           Patient Participation / Response:  Fully participated with the group by sharing personal reflections / insights and openly received / provided feedback with other participants.    Identified / Expressed personal readiness to practice skills    Treatment Plan:  Patient has a current master individualized treatment plan.  See Epic treatment plan for more information.    Malgorzata Fox RN

## 2021-09-13 ENCOUNTER — HOSPITAL ENCOUNTER (OUTPATIENT)
Dept: BEHAVIORAL HEALTH | Facility: CLINIC | Age: 26
End: 2021-09-13
Attending: PSYCHIATRY & NEUROLOGY
Payer: COMMERCIAL

## 2021-09-13 PROCEDURE — G0177 OPPS/PHP; TRAIN & EDUC SERV: HCPCS | Mod: GT

## 2021-09-13 PROCEDURE — 90853 GROUP PSYCHOTHERAPY: CPT | Mod: GT | Performed by: COUNSELOR

## 2021-09-13 NOTE — GROUP NOTE
"Process Group Note    PATIENT'S NAME: Nimisha Jasso  MRN:   [id number]  :   1995  ACCT. NUMBER: [id number]  DATE OF SERVICE: 21  START TIME:  1:00 PM  END TIME:  1:50 PM  FACILITATOR: Tyra Ricci [facility]  TOPIC:  Process Group    Diagnoses:  296.32 (F33.1) Major Depressive Disorder, Recurrent Episode, Moderate  300.02 (F41.1) Generalized Anxiety Disorder      [facility] Mental [facility] Day Treatment  TRACK: 1B    NUMBER OF PARTICIPANTS: 6                                      Service Modality:  Video Visit     Telemedicine Visit: The patient's condition can be safely assessed and treated via synchronous audio and visual telemedicine encounter.      Reason for Telemedicine Visit: Services only offered telehealth    Originating Site (Patient Location): Patient's home    Distant Site (Provider Location): Provider Remote Setting- Home Office    Consent:  The patient/guardian has verbally consented to: the potential risks and benefits of telemedicine (video visit) versus in person care; bill my insurance or make self-payment for services provided; and responsibility for payment of non-covered services.     Patient would like the video invitation sent by:  My Chart    Mode of Communication:  Video Conference via [employer]    As the provider I attest to compliance with applicable laws and regulations related to telemedicine.                Data:    Session content: At the start of this group, patients were invited to check in by identifying themselves, describing their current emotional status, and identifying issues to address in this group.   Area(s) of treatment focus addressed in this session included Symptom Management and Personal Safety.    Keith reported feeling \"pretty low\" today.  They were unable to identify any goals for the day.  Patient declined additional process time but contributed to group discussion and provided feedback and support to peers.      Therapeutic " Interventions/Treatment Strategies:  Psychotherapist offered support, feedback and validation and reinforced use of skills.    Assessment:    Patient response:   Patient responded to session by accepting feedback, giving feedback and listening    Possible barriers to participation / learning include: and no barriers identified    Health Issues:   None reported       Substance Use Review:   Substance Use: No active concerns identified.    Mental Status/Behavioral Observations  Appearance:   Appropriate   Eye Contact:   Good   Psychomotor Behavior: Normal   Attitude:   Cooperative   Orientation:   All  Speech   Rate / Production: Normal    Volume:  Normal   Mood:    Depressed   Affect:    Appropriate   Thought Content:   Safety reports  presence of suicidal ideation passive suicidal ideation   Thought Form:  Coherent  Logical     Insight:    Good     Plan:     Safety Plan: No current safety concerns identified.  Recommended that patient call 911 or go to the local ED should there be a change in any of these risk factors.     Barriers to treatment: None identified    Patient Contracts (see media tab):  None    Substance Use: Not addressed in session     Continue or Discharge: Patient will continue in Adult Day Treatment (ADT)  as planned. Patient is likely to benefit from learning and using skills as they work toward the goals identified in their treatment plan.      Tyra Ricci, [facility]  September 13, 2021

## 2021-09-13 NOTE — GROUP NOTE
Psychotherapy Group Note    PATIENT'S NAME: Nimisha Jasso  MRN:   [id number]  :   1995  ACCT. NUMBER: [id number]  DATE OF SERVICE: 21  START TIME:  3:00 PM  END TIME:  3:50 PM  FACILITATOR: Tyra Ricci LPCC  TOPIC: MH EBP Group: Cognitive Restructuring  [facility] Adult Mental Health Day Treatment  TRACK: 1B    NUMBER OF PARTICIPANTS: 5                                      Service Modality:  Video Visit     Telemedicine Visit: The patient's condition can be safely assessed and treated via synchronous audio and visual telemedicine encounter.      Reason for Telemedicine Visit: Services only offered telehealth    Originating Site (Patient Location): Patient's home    Distant Site (Provider Location): Provider Remote Setting- Home Office    Consent:  The patient/guardian has verbally consented to: the potential risks and benefits of telemedicine (video visit) versus in person care; bill my insurance or make self-payment for services provided; and responsibility for payment of non-covered services.     Patient would like the video invitation sent by:  My Chart    Mode of Communication:  Video Conference via Medical Zoom    As the provider I attest to compliance with applicable laws and regulations related to telemedicine.        Summary of Group / Topics Discussed:  Cognitive Restructuring: Distortions: Patients received an overview of how to identify common cognitive distortions. Patients will explore alternatives to cognitive distortions and practice challenging their negative thought patterns. The goal is to help patients target modify ineffective thought patterns.     Patient Session Goals / Objectives:    Familiarized self with ineffective / unhealthy thoughts and how they develop.      Explored impact of ineffective thoughts / distortions on mood and activity    Formulated new neutral/positive alternatives to challenge less helpful / ineffective thoughts.    Practiced and plan to apply  in daily life               Patient Participation / Response:  Fully participated with the group by sharing personal reflections / insights and openly received / provided feedback with other participants.    Demonstrated understanding of topics discussed through group discussion and participation, Expressed understanding of the relationship between behaviors, thoughts, and feelings and Demonstrated knowledge of personal thought patterns and how they impact their mood and behavior.    Treatment Plan:  Patient has a current master individualized treatment plan.  See Epic treatment plan for more information.    Tyra Ricci, [facility]

## 2021-09-13 NOTE — GROUP NOTE
Psychoeducation Group Note    PATIENT'S NAME: Nimisha Jasso  MRN:   [id number]  :   1995  ACCT. NUMBER: [id number]  DATE OF SERVICE: 21  START TIME:  2:00 PM  END TIME:  2:50 PM  FACILITATOR: Nehemiah Freeman OTR/L  TOPIC: MH Life Skills Group: Resiliency Development                                    Service Modality:  Video Visit     Telemedicine Visit: The patient's condition can be safely assessed and treated via synchronous audio and visual telemedicine encounter.      Reason for Telemedicine Visit: Services only offered telehealth    Originating Site (Patient Location): Patient's home    Distant Site (Provider Location): Provider Remote Setting- Home Office    Consent:  The patient/guardian has verbally consented to: the potential risks and benefits of telemedicine (video visit) versus in person care; bill my insurance or make self-payment for services provided; and responsibility for payment of non-covered services.     Mode of Communication:  Video Conference via Medical Zoom    As the provider I attest to compliance with applicable laws and regulations related to telemedicine.       [facility] Mental Health Day Treatment  TRACK: 1B    NUMBER OF PARTICIPANTS: 5    Summary of Group / Topics Discussed:  Resiliency Development:  Coping Skills(Management of Change): Patients were taught how to identify stressors, signs of stress, coping skills, and prevention strategies for overall stress management.  Patients were given the opportunity to identify both ongoing and acute mental health symptoms and how to effectively manage these symptoms by developing an effective aftercare plan.  Patients increased awareness of community based resources.    Patient Session Goals / Objectives:    Identified how using coping skills can be used for symptom and stress management       Improved awareness of individualed symptoms and stressors and how to effectively cope     Established a relapse  prevention plan to practice these skills in their own environments    Practiced and reflected on how to generalize taught skills to their everyday life          Patient Participation / Response:  Fully participated with the group by sharing personal reflections / insights and openly received / provided feedback with other participants.    Demonstrated understanding of content through handouts/group discussion , Verbalized understanding of content and Patient would benefit from additional opportunities to practice the content to be able to generalize it to their everyday life with increased intentionality, consistency, and efficacy in support of their psychiatric recovery    Treatment Plan:  Patient has a current master individualized treatment plan.  See Epic treatment plan for more information.    Nehemiah Freeman, OTR/L

## 2021-09-15 ENCOUNTER — HOSPITAL ENCOUNTER (OUTPATIENT)
Dept: BEHAVIORAL HEALTH | Facility: CLINIC | Age: 26
End: 2021-09-15
Attending: PSYCHIATRY & NEUROLOGY
Payer: COMMERCIAL

## 2021-09-15 PROCEDURE — 90853 GROUP PSYCHOTHERAPY: CPT | Mod: 95 | Performed by: COUNSELOR

## 2021-09-15 PROCEDURE — G0177 OPPS/PHP; TRAIN & EDUC SERV: HCPCS | Mod: GT

## 2021-09-15 NOTE — GROUP NOTE
Process Group Note    PATIENT'S NAME: Nimisha Jasso  MRN:   [id number]  :   1995  ACCT. NUMBER: [id number]  DATE OF SERVICE: 9/15/21  START TIME:  2:00 PM  END TIME:  2:50 PM  FACILITATOR: Tyra Ricci [facility]  TOPIC:  Process Group    Diagnoses:  296.32 (F33.1) Major Depressive Disorder, Recurrent Episode, Moderate  300.02 (F41.1) Generalized Anxiety Disorder      [facility] Mental [facility] Day Treatment  TRACK: 1B    NUMBER OF PARTICIPANTS: 3                                      Service Modality:  Video Visit     Telemedicine Visit: The patient's condition can be safely assessed and treated via synchronous audio and visual telemedicine encounter.      Reason for Telemedicine Visit: Services only offered telehealth    Originating Site (Patient Location): Patient's home    Distant Site (Provider Location): Provider Remote Setting- Home Office    Consent:  The patient/guardian has verbally consented to: the potential risks and benefits of telemedicine (video visit) versus in person care; bill my insurance or make self-payment for services provided; and responsibility for payment of non-covered services.     Patient would like the video invitation sent by:  My Chart    Mode of Communication:  Video Conference via Medical Zoom    As the provider I attest to compliance with applicable laws and regulations related to telemedicine.                Data:    Session content: At the start of this group, patients were invited to check in by identifying themselves, describing their current emotional status, and identifying issues to address in this group.   Area(s) of treatment focus addressed in this session included Symptom Management and Personal Safety.    Keith reported not doing well today.  They reported that they have been really struggling the past few days.  They reported not feeling comfortable sharing what's going on with the group.  Their goal today is to practice some grounding skills  today.  Patient declined additional process time but contributed to group discussion and provided feedback and support to peers.      Therapeutic Interventions/Treatment Strategies:  Psychotherapist offered support, feedback and validation and reinforced use of skills.    Assessment:    Patient response:   Patient responded to session by accepting feedback, giving feedback and listening    Possible barriers to participation / learning include: and no barriers identified    Health Issues:   None reported       Substance Use Review:   Substance Use: No active concerns identified.    Mental Status/Behavioral Observations  Appearance:   Appropriate   Eye Contact:   Good   Psychomotor Behavior: Normal   Attitude:   Cooperative   Orientation:   All  Speech   Rate / Production: Normal    Volume:  Normal   Mood:    Anxious  Depressed   Affect:    Subdued  Tearful  Thought Content:   Safety reports  presence of suicidal ideation passive suicidal ideation   Thought Form:  Coherent  Logical     Insight:    Good     Plan:     Safety Plan: Committed to safety and agreed to follow previously developed safety coping plan.      Barriers to treatment: None identified    Patient Contracts (see media tab):  None    Substance Use: Not addressed in session     Continue or Discharge: Patient will continue in Adult Day Treatment (ADT)  as planned. Patient is likely to benefit from learning and using skills as they work toward the goals identified in their treatment plan.      Tyra Ricci, [facility]  September 15, 2021

## 2021-09-15 NOTE — GROUP NOTE
Psychoeducation Group Note    PATIENT'S NAME: Nimisha Jasso  MRN:   [id number]  :   1995  ACCT. NUMBER: [id number]  DATE OF SERVICE: 9/15/21  START TIME:  1:00 PM  END TIME:  1:50 PM  FACILITATOR: Nehemiah Freeman OTR/L  TOPIC: MH Life Skills Group: Resiliency Development                                    Service Modality:  Video Visit     Telemedicine Visit: The patient's condition can be safely assessed and treated via synchronous audio and visual telemedicine encounter.      Reason for Telemedicine Visit: Services only offered telehealth    Originating Site (Patient Location): Patient's home    Distant Site (Provider Location): Provider Remote Setting- Home Office    Consent:  The patient/guardian has verbally consented to: the potential risks and benefits of telemedicine (video visit) versus in person care; bill my insurance or make self-payment for services provided; and responsibility for payment of non-covered services.     Mode of Communication:  Video Conference via Medical Zoom    As the provider I attest to compliance with applicable laws and regulations related to telemedicine.       [facility] Adult Mental Health Day Treatment  TRACK: 1B    NUMBER OF PARTICIPANTS: 3    Summary of Group / Topics Discussed:  Resiliency Development:  Coping Skills: Personal Recovery Outcome Measure: Patients were taught how to identify coping strategies and routines that they can adopt and use for management with focus on a balance between physical, emotional, social and spiritual strategies.    Patient Session Goals / Objectives:    Identified personal definitions of recovery for effectively managing both mental health and substance abuse/abuse symptoms     Improved awareness of the process of recovery and skills and strategies that support this       Established a plan for practice of these skills in their own environments    Practiced and reflected on how to generalize taught skills to their  everyday life        Patient Participation / Response:  Fully participated with the group by sharing personal reflections / insights and openly received / provided feedback with other participants.    Demonstrated understanding of content through handouts/group discussion , Verbalized understanding of content and Patient would benefit from additional opportunities to practice the content to be able to generalize it to their everyday life with increased intentionality, consistency, and efficacy in support of their psychiatric recovery    Treatment Plan:  Patient has a current master individualized treatment plan.  See Epic treatment plan for more information.    Nehemiah Freeman, OTR/L

## 2021-09-15 NOTE — GROUP NOTE
Psychoeducation Group Note    PATIENT'S NAME: Nimisha Jasso  MRN:   [id number]  :   1995  ACCT. NUMBER: [id number]  DATE OF SERVICE: 9/15/21  START TIME:  3:00 PM  END TIME:  3:50 PM  FACILITATOR: Daniel Sanchez RN  TOPIC: MH RN Group: Health Maintenance                                    Service Modality:  Video Visit     Telemedicine Visit: The patient's condition can be safely assessed and treated via synchronous audio and visual telemedicine encounter.      Reason for Telemedicine Visit: Covid19    Originating Site (Patient Location): Patient's home    Distant Site (Provider Location): Provider Remote Setting- Home Office    Consent:  The patient/guardian has verbally consented to: the potential risks and benefits of telemedicine (video visit) versus in person care; bill my insurance or make self-payment for services provided; and responsibility for payment of non-covered services.     Patient would like the video invitation sent by:  My Chart    Mode of Communication:  Video Conference via Medical Zoom    As the provider I attest to compliance with applicable laws and regulations related to telemedicine.        [facility] Adult Mental Health Day Treatment  TRACK: 1b    NUMBER OF PARTICIPANTS: 3    Summary of Group / Topics Discussed:  Health Maintenance: Wellness Check-in: Patients met with group facilitator to individually review a holistic wellness check-in to assess patient medication adherence/concerns, appointments, physical and mental health, exercise, nutrition, sleep, socialization, substance use, and need for service/resource referrals.       Patient Session Goals / Objectives:    Discussed various aspects of health management and self-care related to physical and mental health    Demonstrated increased self-awareness of current wellness needs    Developed health literacy skills in navigating the healthcare system and self-advocacy/communicating needs with health care team           Patient Participation / Response:  Fully participated with the group by sharing personal reflections / insights and openly received / provided feedback with other participants.    Demonstrated understanding of topics discussed through group discussion and participation    Treatment Plan:  Patient has a current master individualized treatment plan.  See Epic treatment plan for more information.    Daniel Sanchez RN

## 2021-09-16 ENCOUNTER — HOSPITAL ENCOUNTER (OUTPATIENT)
Dept: BEHAVIORAL HEALTH | Facility: CLINIC | Age: 26
End: 2021-09-16
Attending: PSYCHIATRY & NEUROLOGY
Payer: COMMERCIAL

## 2021-09-16 PROCEDURE — G0177 OPPS/PHP; TRAIN & EDUC SERV: HCPCS | Mod: GT | Performed by: OCCUPATIONAL THERAPIST

## 2021-09-16 PROCEDURE — 90853 GROUP PSYCHOTHERAPY: CPT | Mod: GT | Performed by: COUNSELOR

## 2021-09-16 PROCEDURE — 90853 GROUP PSYCHOTHERAPY: CPT | Mod: 95 | Performed by: COUNSELOR

## 2021-09-16 NOTE — GROUP NOTE
"Process Group Note    PATIENT'S NAME: Nimisha Jasso  MRN:   [id number]  :   1995  ACCT. NUMBER: [id number]  DATE OF SERVICE: 21  START TIME:  2:00 PM  END TIME:  2:50 PM  FACILITATOR: Tyra Ricci [facility]  TOPIC:  Process Group    Diagnoses:  296.32 (F33.1) Major Depressive Disorder, Recurrent Episode, Moderate  300.02 (F41.1) Generalized Anxiety Disorder      [facility] Day Treatment  TRACK: 1B    NUMBER OF PARTICIPANTS: 5                                      Service Modality:  Video Visit     Telemedicine Visit: The patient's condition can be safely assessed and treated via synchronous audio and visual telemedicine encounter.      Reason for Telemedicine Visit: Services only offered telehealth    Originating Site (Patient Location): Patient's home    Distant Site (Provider Location): Provider Remote Setting- Home Office    Consent:  The patient/guardian has verbally consented to: the potential risks and benefits of telemedicine (video visit) versus in person care; bill my insurance or make self-payment for services provided; and responsibility for payment of non-covered services.     Patient would like the video invitation sent by:  My Chart    Mode of Communication:  Video Conference via Medical Zoom    As the provider I attest to compliance with applicable laws and regulations related to telemedicine.                Data:    Session content: At the start of this group, patients were invited to check in by identifying themselves, describing their current emotional status, and identifying issues to address in this group.   Area(s) of treatment focus addressed in this session included Symptom Management and Personal Safety.    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     Therapeutic Interventions/Treatment Strategies:  Psychotherapist offered support, feedback and validation and reinforced use of skills. Treatment modalities used include Motivational Interviewing, Cognitive Behavioral Therapy and Dialectical Behavioral Therapy. Interventions include Symptoms Management: Promoted understanding of their diagnoses and how it impacts their functioning.    Assessment:    Patient response:   Patient responded to session by accepting feedback, giving feedback and listening    Possible barriers to participation / learning include: and no barriers identified    Health Issues:   None reported       Substance Use Review:   Substance Use: Substance use has decreased    Mental Status/Behavioral Observations  Appearance:   Appropriate   Eye Contact:   Good   Psychomotor Behavior: Normal   Attitude:   Cooperative   Orientation:   All  Speech   Rate / Production: Normal    Volume:  Normal   Mood:    Anxious  Depressed   Affect:    Appropriate   Thought Content:   Safety reports  presence of suicidal ideation active suicidal ideation  and passive suicidal ideation   Thought Form:  Coherent  Logical     Insight:    Good     Plan:     Safety Plan: Recommended that patient call 911 or go to the local ED should there be a change in any of these risk factors.    Committed to safety and agreed to follow previously developed safety coping plan.      Barriers to treatment: None identified    Patient Contracts (see media tab):  None    Substance Use: Not addressed in session     Continue or Discharge: Patient will continue in Adult Day Treatment (ADT)  as planned. Patient is likely to benefit from learning and using skills as they work toward the goals identified in their treatment plan.      Tyra Ricci, [facility]  September 16, 2021

## 2021-09-16 NOTE — GROUP NOTE
Psychotherapy Group Note    PATIENT'S NAME: Nimisha Jasso  MRN:   [id number]  :   1995  ACCT. NUMBER: [id number]  DATE OF SERVICE: 21  START TIME:  3:00 PM  END TIME:  3:50 PM  FACILITATOR: Tyra Ricci LPCC  TOPIC: MH EBP Group: Cognitive Restructuring  [facility] Adult Mental Health Day Treatment  TRACK: 1B    NUMBER OF PARTICIPANTS: 5                                      Service Modality:  Video Visit     Telemedicine Visit: The patient's condition can be safely assessed and treated via synchronous audio and visual telemedicine encounter.      Reason for Telemedicine Visit: Services only offered telehealth    Originating Site (Patient Location): Patient's home    Distant Site (Provider Location): Provider Remote Setting- Home Office    Consent:  The patient/guardian has verbally consented to: the potential risks and benefits of telemedicine (video visit) versus in person care; bill my insurance or make self-payment for services provided; and responsibility for payment of non-covered services.     Patient would like the video invitation sent by:  My Chart    Mode of Communication:  Video Conference via Medical Zoom    As the provider I attest to compliance with applicable laws and regulations related to telemedicine.          Summary of Group / Topics Discussed:  Cognitive Restructuring: Perfectionism: Patients discussed and reflected on what perfectionism is, how it develops, and how it impacts functioning. Ways to challenge perfectionism were explored and discussed by the group, with the goal of challenging perfectionistic thinking and improving functioning.    Patient Session Goals / Objectives:    Understand the concept of perfectionistic thoughts and how they develop.     Increase recognition of the connection between perfectionistic thinking and symptoms.    Explore and practice new ways to challenge the perfectionistic stance and replace with reasonable expectations of self and  others.    Begin to formulate realistic personal expectations and goals.               Patient Participation / Response:  Fully participated with the group by sharing personal reflections / insights and openly received / provided feedback with other participants.    Demonstrated understanding of topics discussed through group discussion and participation, Expressed understanding of the relationship between behaviors, thoughts, and feelings and Demonstrated knowledge of personal thought patterns and how they impact their mood and behavior.    Treatment Plan:  Patient has a current master individualized treatment plan.  See Epic treatment plan for more information.    Tyra Ricci, [facility]

## 2021-09-16 NOTE — GROUP NOTE
Psychoeducation Group Note    PATIENT'S NAME: Nimisha Jasso  MRN:   [id number]  :   1995  ACCT. NUMBER: [id number]  DATE OF SERVICE: 21  START TIME:  1:00 PM  END TIME:  1:50 PM  FACILITATOR: Kathy Raymundo OTR/L  TOPIC: MH Life Skills Group: Communication and Social Skills Development  [facility] Adult Mental Health Day Treatment  TRACK: 1B    NUMBER OF PARTICIPANTS: 5                                      Service Modality:  Video Visit     Telemedicine Visit: The patient's condition can be safely assessed and treated via synchronous audio and visual telemedicine encounter.      Reason for Telemedicine Visit: Services only offered telehealth    Originating Site (Patient Location): Patient's home    Distant Site (Provider Location): [facility]    Consent:  The patient/guardian has verbally consented to: the potential risks and benefits of telemedicine (video visit) versus in person care; bill my insurance or make self-payment for services provided; and responsibility for payment of non-covered services.     Patient would like the video invitation sent by:  My Chart    Mode of Communication:  Video Conference via Medical Zoom    As the provider I attest to compliance with applicable laws and regulations related to telemedicine.         Summary of Group / Topics Discussed:  Communication and Social Skills Development: Social Supports: Making Connections: Patients were taught about the importance of connecting with other people.  Patients gained awareness of their personal strengths and opportunities for growth in communicating with others.  Patients were taught skills and given an opportunity to practice ways to improve their ability to connect with others.  Focus was also on building group connection, trust and cohesion through an experiential activity in a supportive environment.     Patient Session Goals / Objectives:    Identified strengths  and opportunities for growth in connecting with other people and how this   impacts their ability to communicate clearly with other people       Improved awareness of important aspects of the need for connection with other people and how this relates to mental health recovery        Established a plan for practice of these skills in their own environments    Practiced and reflected on how to generalize taught skills to their everyday life      Patient Participation / Response:  Fully participated with the group by sharing personal reflections / insights and openly received / provided feedback with other participants.    Patient presentation: constricted affect that brightened at times; active in group activity, Demonstrated understanding of content through sharing with peers and offering support/encouragement to others , Verbalized understanding of content and Patient would benefit from additional opportunities to practice the content to be able to generalize it to their everyday life with increased intentionality, consistency, and efficacy in support of their psychiatric recovery    Treatment Plan:  Patient has a current master individualized treatment plan.  See Epic treatment plan for more information.    Kathy Raymundo, OTR/L

## 2021-09-17 NOTE — ADDENDUM NOTE
Encounter addended by: Tyra Ricci [facility] on: 9/17/2021 1:28 PM   Actions taken: Flowsheet accepted, Clinical Note Signed

## 2021-09-20 ENCOUNTER — TELEPHONE (OUTPATIENT)
Dept: BEHAVIORAL HEALTH | Facility: CLINIC | Age: 26
End: 2021-09-20

## 2021-09-20 ENCOUNTER — HOSPITAL ENCOUNTER (OUTPATIENT)
Dept: BEHAVIORAL HEALTH | Facility: CLINIC | Age: 26
End: 2021-09-20
Attending: PSYCHIATRY & NEUROLOGY
Payer: COMMERCIAL

## 2021-09-20 PROCEDURE — G0177 OPPS/PHP; TRAIN & EDUC SERV: HCPCS | Mod: GT

## 2021-09-20 PROCEDURE — 90853 GROUP PSYCHOTHERAPY: CPT | Mod: GT | Performed by: COUNSELOR

## 2021-09-20 NOTE — GROUP NOTE
Psychotherapy Group Note    PATIENT'S NAME: Nimisha Jasso  MRN:   [id number]  :   1995  ACCT. NUMBER: [id number]  DATE OF SERVICE: 21  START TIME:  3:00 PM  END TIME:  3:50 PM  FACILITATOR: Tyra Ricci LP[facility]  TOPIC:  EBP Group: [facility] Adult Mental Health Day Treatment  TRACK: 1B    NUMBER OF PARTICIPANTS: 4                                      Service Modality:  Video Visit     Telemedicine Visit: The patient's condition can be safely assessed and treated via synchronous audio and visual telemedicine encounter.      Reason for Telemedicine Visit: Services only offered telehealth    Originating Site (Patient Location): Patient's home    Distant Site (Provider Location): Provider Remote Setting- Home Office    Consent:  The patient/guardian has verbally consented to: the potential risks and benefits of telemedicine (video visit) versus in person care; bill my insurance or make self-payment for services provided; and responsibility for payment of non-covered services.     Patient would like the video invitation sent by:  My Chart    Mode of Communication:  Video Conference via Medical Zoom    As the provider I attest to compliance with applicable laws and regulations related to telemedicine.          Summary of Group / Topics Discussed:  Mindfulness: What is Mindfulness: Patients received an overview on what mindfulness is and how mindfulness can benefit general health, mental health symptoms, and stressors. The history of mindfulness, its application to mental health therapies, and key concepts were also discussed. Patients discussed current awareness, knowledge, and practice of mindfulness skills. Patients also discussed barriers to mindfulness practice.     Patient Session Goals / Objectives:    Demonstrated understanding of key concepts and application to daily life    Identified when/how to use mindfulness     Resolved barriers to practice    Identified plan to use  mindfulness in daily life      Patient Participation / Response:  Fully participated with the group by sharing personal reflections / insights and openly received / provided feedback with other participants.    Demonstrated understanding of topics discussed through group discussion and participation, Demonstrated understanding of mindfulness skills and benefits of practice and Identified / Expressed personal readiness to practice mindfulness skills    Treatment Plan:  Patient has a current master individualized treatment plan.  See Epic treatment plan for more information.    Tyra Ricci, [facility]

## 2021-09-20 NOTE — GROUP NOTE
"Process Group Note    PATIENT'S NAME: Nimisha Jasso  MRN:   [id number]  :   1995  ACCT. NUMBER: [id number]  DATE OF SERVICE: 21  START TIME:  1:00 PM  END TIME:  1:50 PM  FACILITATOR: Tyra Ricci [facility]  TOPIC:  Process Group    Diagnoses:  296.32 (F33.1) Major Depressive Disorder, Recurrent Episode, Moderate  300.02 (F41.1) Generalized Anxiety Disorder      [facility] Mental [facility] Day Treatment  TRACK: 1B    NUMBER OF PARTICIPANTS: 5                                      Service Modality:  Video Visit     Telemedicine Visit: The patient's condition can be safely assessed and treated via synchronous audio and visual telemedicine encounter.      Reason for Telemedicine Visit: Services only offered telehealth    Originating Site (Patient Location): Patient's home    Distant Site (Provider Location): Provider Remote Setting- Home Office    Consent:  The patient/guardian has verbally consented to: the potential risks and benefits of telemedicine (video visit) versus in person care; bill my insurance or make self-payment for services provided; and responsibility for payment of non-covered services.     Patient would like the video invitation sent by:  My Chart    Mode of Communication:  Video Conference via Medical Zoom    As the provider I attest to compliance with applicable laws and regulations related to telemedicine.                Data:    Session content: At the start of this group, patients were invited to check in by identifying themselves, describing their current emotional status, and identifying issues to address in this group.   Area(s) of treatment focus addressed in this session included Symptom Management and Personal Safety.    Keith reported not doing well and doesn't want to be here today.  She reported that her goal for the day is to \"keep it together\" today.  She reported \"not feeling very safe\" but her roommate knows how she is doing.  Patient declined additional " process time but contributed to group discussion and provided feedback and support to peers.      Therapeutic Interventions/Treatment Strategies:  Psychotherapist offered support, feedback and validation.    Assessment:    Patient response:   Patient responded to session by accepting feedback, giving feedback and listening    Possible barriers to participation / learning include: and no barriers identified    Health Issues:   None reported       Substance Use Review:   Substance Use: No active concerns identified.    Mental Status/Behavioral Observations  Appearance:   Appropriate   Eye Contact:   Good   Psychomotor Behavior: Normal   Attitude:   Cooperative   Orientation:   All  Speech   Rate / Production: Normal    Volume:  Normal   Mood:    Anxious  Depressed  Sad   Affect:    Subdued  Tearful  Thought Content:   Safety reports  presence of suicidal ideation active suicidal ideation  and passive suicidal ideation   Thought Form:  Coherent  Logical     Insight:    Good     Plan:     Safety Plan: Committed to safety and agreed to follow previously developed safety coping plan.      Barriers to treatment: None identified    Patient Contracts (see media tab):  None    Substance Use: Not addressed in session     Continue or Discharge: Patient will continue in Adult Day Treatment (ADT)  as planned. Patient is likely to benefit from learning and using skills as they work toward the goals identified in their treatment plan.      Tyra Ricci, [facility]  September 20, 2021

## 2021-09-20 NOTE — TELEPHONE ENCOUNTER
"Writer spoke with Keith after group to follow up on safety concerns they wished to not discuss in group.  They reported feeling unsafe in the sense that they don't know how they will be able to get through this but has a coping plan that they feel capable of using and their roommate has their sleeping medications as that is their preferred method.  Writer suggested going to the ER to get evaluated but they expressed reluctance due to \"not thinking that inpatient would do anything for me.\"  Writer suggested they look into the [facility] unit and provided some basic information on it and Keith reported that they would definitely consider it.    Tyra Ricci [facility] on 9/20/2021 at 4:22 PM    "

## 2021-09-20 NOTE — ADDENDUM NOTE
Encounter addended by: Tyra Ricci [facility] on: 9/20/2021 3:57 PM   Actions taken: Delete clinical note, Charge Capture section accepted, Flowsheet accepted

## 2021-09-20 NOTE — GROUP NOTE
Psychoeducation Group Note    PATIENT'S NAME: Nimisha Jasso  MRN:   [id number]  :   1995  ACCT. NUMBER: [id number]  DATE OF SERVICE: 21  START TIME:  2:00 PM  END TIME:  2:50 PM  FACILITATOR: Nehemiah Freeman OTR/L  TOPIC:  Life Skills Group: Communication and Social Skills Development                                    Service Modality:  Video Visit     Telemedicine Visit: The patient's condition can be safely assessed and treated via synchronous audio and visual telemedicine encounter.      Reason for Telemedicine Visit: Services only offered telehealth    Originating Site (Patient Location): Patient's home    Distant Site (Provider Location): Provider Remote Setting- Home Office    Consent:  The patient/guardian has verbally consented to: the potential risks and benefits of telemedicine (video visit) versus in person care; bill my insurance or make self-payment for services provided; and responsibility for payment of non-covered services.     Mode of Communication:  Video Conference via Medical Zoom    As the provider I attest to compliance with applicable laws and regulations related to telemedicine.       [facility] Mental Health Day Treatment  TRACK: 1B    NUMBER OF PARTICIPANTS: 6    Summary of Group / Topics Discussed:  Communication and Social Skills Development:: Communication Skills Improvement:Patients completed a self assessment of personal communication skills by identifying both strengths and opportunities for growth in areas of messages, emotions, assertiveness and listening skills.  Patients gained awareness of effective communication skills to improve overall communication and connection with other people.      Patient Session Goals / Objectives:    Identified strengths and opportunities for growth in communication skills and how these  impact their ability to communicate clearly with other people       Improved awareness of important aspects of communication  skills and how this relates to mental health recovery        Established a plan for practice of these skills in their own environments    Practiced and reflected on how to generalize taught skills to their everyday life      Patient Participation / Response:  Moderately participated, sharing some personal reflections / insights and adequately adequately received / provided feedback with other participants.    Patient presentation: Depressed mood,seemed quite distraught., Verbalized understanding of content and Patient would benefit from additional opportunities to practice the content to be able to generalize it to their everyday life with increased intentionality, consistency, and efficacy in support of their psychiatric recovery    Treatment Plan:  Patient has a current master individualized treatment plan.  See Epic treatment plan for more information.    Nehemiah Freeman, OTR/L

## 2021-09-27 ENCOUNTER — HOSPITAL ENCOUNTER (OUTPATIENT)
Dept: BEHAVIORAL HEALTH | Facility: CLINIC | Age: 26
End: 2021-09-27
Attending: PSYCHIATRY & NEUROLOGY
Payer: COMMERCIAL

## 2021-09-27 PROCEDURE — 90853 GROUP PSYCHOTHERAPY: CPT | Mod: 95 | Performed by: COUNSELOR

## 2021-09-27 PROCEDURE — G0177 OPPS/PHP; TRAIN & EDUC SERV: HCPCS | Mod: 95

## 2021-09-27 NOTE — GROUP NOTE
Psychoeducation Group Note    PATIENT'S NAME: Nimisha Jasso  MRN:   [id number]  :   1995  ACCT. NUMBER: [id number]  DATE OF SERVICE: 21  START TIME:  2:00 PM  END TIME:  2:50 PM  FACILITATOR: Nehemiah Freeman OTR/L  TOPIC: MH Life Skills Group: Lifestyle Balance and Structure                                    Service Modality:  Video Visit     Telemedicine Visit: The patient's condition can be safely assessed and treated via synchronous audio and visual telemedicine encounter.      Reason for Telemedicine Visit: Services only offered telehealth    Originating Site (Patient Location): Patient's home    Distant Site (Provider Location): Provider Remote Setting- Home Office    Consent:  The patient/guardian has verbally consented to: the potential risks and benefits of telemedicine (video visit) versus in person care; bill my insurance or make self-payment for services provided; and responsibility for payment of non-covered services.     Mode of Communication:  Video Conference via Medical Zoom    As the provider I attest to compliance with applicable laws and regulations related to telemedicine.       [facility] Mental Health Day Treatment  TRACK: 1B    NUMBER OF PARTICIPANTS: 3    Summary of Group / Topics Discussed:  Lifestyle Balance and Strucure:  Occupations: A Balanced Lifestyle: Patients were introduced to the importance of daily occupations in support of mental health management by exploring a balanced lifestyle.  Patients identified how they spend their time and skills to bring this into better balance.  Patients were assisted to establish, restore, and/or modify performance skills and patterns for improved engagement and promotion of positive mental health through meaningful occupations.  Patients gained awareness of the connection between who they are (self identity) with what they do.    Patient Session Goals / Objectives:    Increased awareness of how patient s  functioning in identified meaningful occupations are impacted by their mental health status     Developed performance skills and performance patterns to enhance occupational engagement through creating a balanced lifestyle    Explored ways to generalize new awareness and skills to their everyday life        Patient Participation / Response:  Fully participated with the group by sharing personal reflections / insights and openly received / provided feedback with other participants.    Demonstrated understanding of content through handouts/group discussion , Verbalized understanding of content and Patient would benefit from additional opportunities to practice the content to be able to generalize it to their everyday life with increased intentionality, consistency, and efficacy in support of their psychiatric recovery    Treatment Plan:  Patient has a current master individualized treatment plan.  See Epic treatment plan for more information.    Nehemiah Freeman, OTR/L

## 2021-09-27 NOTE — GROUP NOTE
"Process Group Note    PATIENT'S NAME: Nimisha Jasso  MRN:   [id number]  :   1995  ACCT. NUMBER: [id number]  DATE OF SERVICE: 21  START TIME:  1:00 PM  END TIME:  1:50 PM  FACILITATOR: Tyra Ricci [facility]  TOPIC:  Process Group    Diagnoses:  296.32 (F33.1) Major Depressive Disorder, Recurrent Episode, Moderate  300.02 (F41.1) Generalized Anxiety Disorder      [facility] Mental [facility] Day Treatment  TRACK: 1B    NUMBER OF PARTICIPANTS: 5                                      Service Modality:  Video Visit     Telemedicine Visit: The patient's condition can be safely assessed and treated via synchronous audio and visual telemedicine encounter.      Reason for Telemedicine Visit: Services only offered telehealth    Originating Site (Patient Location): Patient's home    Distant Site (Provider Location): Provider Remote Setting- Home Office    Consent:  The patient/guardian has verbally consented to: the potential risks and benefits of telemedicine (video visit) versus in person care; bill my insurance or make self-payment for services provided; and responsibility for payment of non-covered services.     Patient would like the video invitation sent by:  My Chart    Mode of Communication:  Video Conference via Medical Zoom    As the provider I attest to compliance with applicable laws and regulations related to telemedicine.                Data:    Session content: At the start of this group, patients were invited to check in by identifying themselves, describing their current emotional status, and identifying issues to address in this group.   Area(s) of treatment focus addressed in this session included Symptom Management and Personal Safety.    Keith reported feeling \"pretty good\" today but physically achy.  Their goal today is to get some chores done around the house.  They reported that they are feeling a lot better after their show this weekend and it was a good \"self-esteem boost.\"  " Patient declined additional process time but contributed to group discussion and provided feedback and support to peers.      Therapeutic Interventions/Treatment Strategies:  Psychotherapist offered support, feedback and validation and reinforced use of skills.    Assessment:    Patient response:   Patient responded to session by accepting feedback, giving feedback and listening    Possible barriers to participation / learning include: and no barriers identified    Health Issues:   None reported       Substance Use Review:   Substance Use: No active concerns identified.    Mental Status/Behavioral Observations  Appearance:   Appropriate   Eye Contact:   Good   Psychomotor Behavior: Normal   Attitude:   Cooperative   Orientation:   All  Speech   Rate / Production: Normal    Volume:  Normal   Mood:    Anxious  Depressed   Affect:    Appropriate   Thought Content:   Clear  Thought Form:  Coherent  Logical     Insight:    Good     Plan:     Safety Plan: No current safety concerns identified.  Recommended that patient call 911 or go to the local ED should there be a change in any of these risk factors.     Barriers to treatment: None identified    Patient Contracts (see media tab):  None    Substance Use: Not addressed in session     Continue or Discharge: Patient will continue in Adult Day Treatment (ADT)  as planned. Patient is likely to benefit from learning and using skills as they work toward the goals identified in their treatment plan.      Tyra Ricci, [facility]  September 27, 2021

## 2021-09-27 NOTE — GROUP NOTE
Psychotherapy Group Note    PATIENT'S NAME: Nimisha Jasso  MRN:   [id number]  :   1995  ACCT. NUMBER: [id number]  DATE OF SERVICE: 21  START TIME:  3:00 PM  END TIME:  3:50 PM  [facility]ACILIT[facility]: [facility]  TOPIC: MH EBP Group: Coping Skills  [facility] Adult Dual Diagnosis Day Treatment  TRACK: 1B    NUMBER OF PARTICIPANTS: 3                                      Service Modality:  Video Visit     Telemedicine Visit: The patient's condition can be safely assessed and treated via synchronous audio and visual telemedicine encounter.      Reason for Telemedicine Visit: Services only offered telehealth    Originating Site (Patient Location): Patient's home    Distant Site (Provider Location): Provider Remote Setting- Home Office    Consent:  The patient/guardian has verbally consented to: the potential risks and benefits of telemedicine (video visit) versus in person care; bill my insurance or make self-payment for services provided; and responsibility for payment of non-covered services.     Patient would like the video invitation sent by:  My Chart    Mode of Communication:  Video Conference via Medical Zoom    As the provider I attest to compliance with applicable laws and regulations related to telemedicine.          Summary of Group / Topics Discussed:  Coping Skills: Self-Soothe: Patients learned to apply self-soothe as a way to decrease heightened stress in the moment.  Patients identified situations that necessitate self-soothe strategies.  They focused on ways to manage physical symptoms of distress using the senses. They discussed how to distinguish when this can be useful in their lives when other strategies are more relevant or helpful.    Patient Session Goals / Objectives:    Understand the purpose of using the senses to decrease distress    Process what happens in the body when using self-soothe strategies    Demonstrate understanding of when to use self-soothe  strategies    Identify and problem solve barriers to applying self-soothe strategies.    Choose 1-2 self-soothe strategies to apply during times of distress.        Patient Participation / Response:  Fully participated with the group by sharing personal reflections / insights and openly received / provided feedback with other participants.    Demonstrated understanding of topics discussed through group discussion and participation, Expressed understanding of the relevance / importance of coping skills at distressing times in life, Demonstrated knowledge of when to consider using a variety of coping skills in daily life and Identified barriers to applying coping skills    Treatment Plan:  Patient has a current master individualized treatment plan.  See Epic treatment plan for more information.    Tyra Ricci, [facility]

## 2021-09-29 ENCOUNTER — HOSPITAL ENCOUNTER (OUTPATIENT)
Dept: BEHAVIORAL HEALTH | Facility: CLINIC | Age: 26
End: 2021-09-29
Attending: PSYCHIATRY & NEUROLOGY
Payer: COMMERCIAL

## 2021-09-29 PROCEDURE — 90853 GROUP PSYCHOTHERAPY: CPT | Mod: 95 | Performed by: COUNSELOR

## 2021-09-29 PROCEDURE — G0177 OPPS/PHP; TRAIN & EDUC SERV: HCPCS | Mod: GT

## 2021-09-29 PROCEDURE — G0177 OPPS/PHP; TRAIN & EDUC SERV: HCPCS | Mod: 95 | Performed by: OCCUPATIONAL THERAPIST

## 2021-09-29 NOTE — GROUP NOTE
Psychoeducation Group Note    PATIENT'S NAME: Nimisha Jasso  MRN:   [id number]  :   1995  ACCT. NUMBER: [id number]  DATE OF SERVICE: 21  START TIME:  2:00 PM  END TIME:  2:50 PM  FACILITATOR: Malgorzata Fox RN  TOPIC: VANESSA RN Group: Mental Health Maintenance  [facility] Adult Mental Health Day Treatment  TRACK: 1B    NUMBER OF PARTICIPANTS: 4    Summary of Group / Topics Discussed:  Mental Health Maintenance:  Assessments of Strengths: Patients completed a self-reflection on personal strengths worksheet. The concept of personal strength as it relates to resilience were explored. Patients shared responses with the group and participated in discussion.     Patient Session Goals / Objectives:  ? Patients identified 1-3 qualities they consider a personal strength.  ? Patients understood the concept of personal strengths and the connection it has to resiliency                                    Service Modality:  Video Visit     Telemedicine Visit: The patient's condition can be safely assessed and treated via synchronous audio and visual telemedicine encounter.      Reason for Telemedicine Visit: Services only offered telehealth    Originating Site (Patient Location): Patient's home    Distant Site (Provider Location): Provider Remote Setting- Home Office    Consent:  The patient/guardian has verbally consented to: the potential risks and benefits of telemedicine (video visit) versus in person care; bill my insurance or make self-payment for services provided; and responsibility for payment of non-covered services.     Patient would like the video invitation sent by:  My Chart    Mode of Communication:  Video Conference via Medical Zoom    As the provider I attest to compliance with applicable laws and regulations related to telemedicine.           Patient Participation / Response:  Fully participated with the group by sharing personal reflections / insights and openly received / provided  feedback with other participants.    Identified / Expressed personal readiness to practice skills    Treatment Plan:  Patient has a current master individualized treatment plan.  See Epic treatment plan for more information.    Malgorzata Fox RN

## 2021-09-30 ENCOUNTER — HOSPITAL ENCOUNTER (OUTPATIENT)
Dept: BEHAVIORAL HEALTH | Facility: CLINIC | Age: 26
End: 2021-09-30
Attending: PSYCHIATRY & NEUROLOGY
Payer: COMMERCIAL

## 2021-09-30 PROCEDURE — 90853 GROUP PSYCHOTHERAPY: CPT | Mod: GT | Performed by: COUNSELOR

## 2021-09-30 PROCEDURE — G0177 OPPS/PHP; TRAIN & EDUC SERV: HCPCS | Mod: GT

## 2021-09-30 NOTE — GROUP NOTE
Psychoeducation Group Note    PATIENT'S NAME: Nimisha Jasso  MRN:   [id number]  :   1995  ACCT. NUMBER: [id number]  DATE OF SERVICE: 21  START TIME:  1:00 PM  END TIME:  1:50 PM  FACILITATOR: Kathy Raymundo OTR/L  TOPIC: MH Life Skills Group: Sensory Approaches in Mental Health  [facility] Adult Mental Health Day Treatment  TRACK: 1B    NUMBER OF PARTICIPANTS: 4                                      Service Modality:  Video Visit     Telemedicine Visit: The patient's condition can be safely assessed and treated via synchronous audio and visual telemedicine encounter.      Reason for Telemedicine Visit: Services only offered telehealth    Originating Site (Patient Location): Patient's home    Distant Site (Provider Location): [facility]    Consent:  The patient/guardian has verbally consented to: the potential risks and benefits of telemedicine (video visit) versus in person care; bill my insurance or make self-payment for services provided; and responsibility for payment of non-covered services.     Patient would like the video invitation sent by:  My Chart    Mode of Communication:  Video Conference via Medical Zoom    As the provider I attest to compliance with applicable laws and regulations related to telemedicine.         Summary of Group / Topics Discussed:  Sensory Approaches in Mental Health:  Coping Through the Senses Introduction: Patients were introduced and taught about neurosensory based skills and strategies related to supporting effective self regulation skills.  Patients were taught about the internal sensory systems (proprioception, vestibular, deep pressure touch, and interoception) and how they can be used for coping with mental health symptoms and stressors.  Patients were provided with an experiential opportunity to increase self-awareness of helpful sensory input and self care activities. Patients were introduced on how  to create supportive environments that encourage use of these skills.         Patient Session Goals / Objectives:    Identified specific and individualized neurosensory skills to help when distressed      Identified skills learned and how this applies to current daily life    Established a plan for practice of these skills in their own environments        Patient Participation / Response:  Moderately participated, sharing some personal reflections / insights and adequately adequately received / provided feedback with other participants.    Patient presentation: constricted affect; shared ideas/examples with the group at times, Verbalized understanding of content and Patient would benefit from additional opportunities to practice the content to be able to generalize it to their everyday life with increased intentionality, consistency, and efficacy in support of their psychiatric recovery    Treatment Plan:  Patient has a current master individualized treatment plan.  See Epic treatment plan for more information.    Kathy Raymundo, OTR/L

## 2021-09-30 NOTE — GROUP NOTE
Psychotherapy Group Note    PATIENT'S NAME: Nimisha Jasso  MRN:   [id number]  :   1995  ACCT. NUMBER: [id number]  DATE OF SERVICE: 21  START TIME:  3:00 PM  END TIME:  3:50 PM  FACILITATOR: Tyra Ricci LPCC  TOPIC:  EBP Group: [facility] Adult Mental Health Day Treatment  TRACK: 1B    NUMBER OF PARTICIPANTS: 4                                      Service Modality:  Video Visit     Telemedicine Visit: The patient's condition can be safely assessed and treated via synchronous audio and visual telemedicine encounter.      Reason for Telemedicine Visit: Services only offered telehealth    Originating Site (Patient Location): Patient's home    Distant Site (Provider Location): Provider Remote Setting- Home Office    Consent:  The patient/guardian has verbally consented to: the potential risks and benefits of telemedicine (video visit) versus in person care; bill my insurance or make self-payment for services provided; and responsibility for payment of non-covered services.     Patient would like the video invitation sent by:  My Chart    Mode of Communication:  Video Conference via Medical Zoom    As the provider I attest to compliance with applicable laws and regulations related to telemedicine.          Summary of Group / Topics Discussed:  Mindfulness: Mindfulness Experiential: Patients received an overview on what mindfulness is and how mindfulness can benefit general health, mental health symptoms, and stressors. The history of mindfulness, its application to mental health therapies, and key concepts were also discussed. Patients discussed current awareness, knowledge, and practice of mindfulness skills. Patients also discussed barriers to mindfulness practice.    Patient Session Goals / Objectives:    Demonstrated and verbalized understanding of key mindfulness concepts    Identified when/how to use mindfulness skills    Resolved barriers to practicing mindfulness  skills    Identified plan to use mindfulness skills in daily life       Patient Participation / Response:  Fully participated with the group by sharing personal reflections / insights and openly received / provided feedback with other participants.    Demonstrated understanding of topics discussed through group discussion and participation, Demonstrated understanding of mindfulness skills and benefits of practice and Identified / Expressed personal readiness to practice mindfulness skills    Treatment Plan:  Patient has a current master individualized treatment plan.  See Epic treatment plan for more information.    Tyra Ricci, [facility]

## 2021-09-30 NOTE — GROUP NOTE
Process Group Note    PATIENT'S NAME: Nimisha Jasso  MRN:   [id number]  :   1995  ACCT. NUMBER: [id number]  DATE OF SERVICE: 21  START TIME:  2:00 PM  END TIME:  2:50 PM  FACILITATOR: Femi Orellana LMFT  TOPIC:  Process Group    Diagnoses:  296.32 (F33.1) Major Depressive Disorder, Recurrent Episode, Moderate  300.02 (F41.1) Generalized Anxiety Disorder          [facility] Mental [facility] Day Treatment  TRACK: 1B    NUMBER OF PARTICIPANTS: 4    Service Modality:  Video Visit     Telemedicine Visit: The patient's condition can be safely assessed and treated via synchronous audio and visual telemedicine encounter.      Reason for Telemedicine Visit: Services only offered telehealth and due to COVID-19.    Originating Site (Patient Location): Patient's home    Distant Site (Provider Location): Provider Remote Setting- Home Office    Consent:  The patient/guardian has verbally consented to: the potential risks and benefits of telemedicine (video visit) versus in person care; bill my insurance or make self-payment for services provided; and responsibility for payment of non-covered services.     Patient would like the video invitation sent by:  My Chart    Mode of Communication:  Video Conference via Medical Zoom    As the provider I attest to compliance with applicable laws and regulations related to telemedicine.          Data:    Session content: At the start of this group, patients were invited to check in by identifying themselves, describing their current emotional status, and identifying issues to address in this group.   Area(s) of treatment focus addressed in this session included Symptom Management, Personal Safety and Community Resources/Discharge Planning.  Patient reported feeling like they are doing OK.  A little extra self-conscious.   Patient discussed working toward mailing a package to a friend in [place].   For skills they will use to address their goal(s), patient  identified just do it.   A barrier to working toward their goal(s) and/or addressing mental health symptoms the patient identified was dealing with shipping regulations.  Patient reported ongoing passive suicidal thoughts with no plan or intent.  They were able to make a commitment to use their safety plan to stay safe.   Patient reported no substance use. Patient reported they are taking their medications as prescribed.   Patient reported feeling proud/grateful for anxiety about performances has gone done and they are feeling good for the first time in a few weeks.  Patient discussed with the treatment group that they are working toward mailing a package to a friend in [facility].    Therapeutic Interventions/Treatment Strategies:  Psychotherapist offered support, feedback and validation and reinforced use of skills. Treatment modalities used include Motivational Interviewing and Cognitive Behavioral Therapy. Interventions include Coping Skills: Assisted patient in identifying 1-2 healthy distraction skills to reduce overall distress, Symptoms Management: Promoted understanding of their diagnoses and how it impacts their functioning and Emotions Management:  Discussed barriers to emotional regulation.    Assessment:    Patient response:   Patient responded to session by accepting feedback, giving feedback, listening, focusing on goals, being attentive and accepting support    Possible barriers to participation / learning include: and no barriers identified    Health Issues:   None reported       Substance Use Review:   Substance Use: No active concerns identified.    Mental Status/Behavioral Observations  Appearance:   Appropriate   Eye Contact:   Good   Psychomotor Behavior: Normal   Attitude:   Cooperative   Orientation:   All  Speech   Rate / Production: Normal    Volume:  Normal   Mood:    Normal Depressed Anxious  Affect:    Appropriate   Thought Content:   Clear and Safety reports  presence of suicidal ideation  passive suicidal ideation   Thought Form:  Coherent  Logical     Insight:    Good     Plan:     Safety Plan: Committed to safety and agreed to follow previously developed safety coping plan.      Barriers to treatment: None identified    Patient Contracts (see media tab):  None    Substance Use: Provided encouragement towards sobriety     Continue or Discharge: Patient will continue in Adult Day Treatment (ADT)  as planned. Patient is likely to benefit from learning and using skills as they work toward the goals identified in their treatment plan.      Femi Orellana, ISAIASFT  September 30, 2021

## 2021-09-30 NOTE — GROUP NOTE
Psychoeducation Group Note    PATIENT'S NAME: Nimisha Jasso  MRN:   [id number]  :   1995  ACCT. NUMBER: [id number]  DATE OF SERVICE: 21  START TIME:  3:00 PM  END TIME:  3:50 PM  FACILITATOR: Malgorzata Fox RN  TOPIC: VANESSA RN Group: Mental Health Maintenance  [facility] Adult Mental Health Day Treatment  TRACK: 1B    NUMBER OF PARTICIPANTS: 4    Summary of Group / Topics Discussed:  Mental Health Maintenance:  Assessments of Strengths: Patients completed a self-reflection on personal strengths worksheet. The concept of personal strength as it relates to resilience were explored. Patients shared responses with the group and participated in discussion.     Patient Session Goals / Objectives:  ? Patients identified 1-3 qualities they consider a personal strength.  ? Patients understood the concept of personal strengths and the connection it has to resiliency                                    Service Modality:  Video Visit     Telemedicine Visit: The patient's condition can be safely assessed and treated via synchronous audio and visual telemedicine encounter.      Reason for Telemedicine Visit: Services only offered telehealth    Originating Site (Patient Location): Patient's home    Distant Site (Provider Location): Provider Remote Setting- Home Office    Consent:  The patient/guardian has verbally consented to: the potential risks and benefits of telemedicine (video visit) versus in person care; bill my insurance or make self-payment for services provided; and responsibility for payment of non-covered services.     Patient would like the video invitation sent by:  my chart    Mode of Communication:  Video Conference via Medical Zoom    As the provider I attest to compliance with applicable laws and regulations related to telemedicine.           Patient Participation / Response:  Fully participated with the group by sharing personal reflections / insights and openly received / provided  feedback with other participants.    Identified / Expressed personal readiness to practice skills    Treatment Plan:  Patient has a current master individualized treatment plan.  See Epic treatment plan for more information.    Malgorzata Fox RN

## 2021-09-30 NOTE — GROUP NOTE
"Process Group Note    PATIENT'S NAME: Nimisha Jasso  MRN:   [id number]  :   1995  ACCT. NUMBER: [id number]  DATE OF SERVICE: 21  START TIME:  1:00 PM  END TIME:  1:50 PM  FACILITATOR: Tyra Ricci [facility]  TOPIC:  Process Group    Diagnoses:  296.32 (F33.1) Major Depressive Disorder, Recurrent Episode, Moderate  300.02 (F41.1) Generalized Anxiety Disorder      [facility] Mental [facility] Day Treatment  TRACK: 1B    NUMBER OF PARTICIPANTS: 5                                      Service Modality:  Video Visit     Telemedicine Visit: The patient's condition can be safely assessed and treated via synchronous audio and visual telemedicine encounter.      Reason for Telemedicine Visit: Services only offered telehealth    Originating Site (Patient Location): Patient's home    Distant Site (Provider Location): Provider Remote Setting- Home Office    Consent:  The patient/guardian has verbally consented to: the potential risks and benefits of telemedicine (video visit) versus in person care; bill my insurance or make self-payment for services provided; and responsibility for payment of non-covered services.     Patient would like the video invitation sent by:  My Chart    Mode of Communication:  Video Conference via Medical Zoom    As the provider I attest to compliance with applicable laws and regulations related to telemedicine.                Data:    Session content: At the start of this group, patients were invited to check in by identifying themselves, describing their current emotional status, and identifying issues to address in this group.   Area(s) of treatment focus addressed in this session included Symptom Management and Personal Safety.    Keith reported feeling \"not great\" today.  They tried to some drawing last night but it wasn't going well and they were frustrated with how negative their self-talk was.  Their goal today is to have a good performance tonight.  They talked about " the negative thoughts they had while drawing and writer encouraged them to work on identifying and challenging those thoughts in the moment.     Therapeutic Interventions/Treatment Strategies:  Psychotherapist offered support, feedback and validation and reinforced use of skills. Treatment modalities used include Motivational Interviewing, Cognitive Behavioral Therapy and Dialectical Behavioral Therapy. Interventions include Coping Skills: Assisted patient in identifying 1-2 healthy distraction skills to reduce overall distress.    Assessment:    Patient response:   Patient responded to session by accepting feedback, giving feedback and listening    Possible barriers to participation / learning include: and no barriers identified    Health Issues:   None reported       Substance Use Review:   Substance Use: No active concerns identified.    Mental Status/Behavioral Observations  Appearance:   Appropriate   Eye Contact:   Good   Psychomotor Behavior: Normal   Attitude:   Cooperative   Orientation:   All  Speech   Rate / Production: Normal    Volume:  Normal   Mood:    Anxious  Depressed   Affect:    Appropriate   Thought Content:   Clear  Thought Form:  Coherent  Logical     Insight:    Good     Plan:     Safety Plan: No current safety concerns identified.  Recommended that patient call 911 or go to the local ED should there be a change in any of these risk factors.     Barriers to treatment: None identified    Patient Contracts (see media tab):  None    Substance Use: Not addressed in session     Continue or Discharge: Patient will continue in Adult Day Treatment (ADT)  as planned. Patient is likely to benefit from learning and using skills as they work toward the goals identified in their treatment plan.      Tyra Ricci, [facility]  September 30, 2021

## 2021-10-04 ENCOUNTER — HOSPITAL ENCOUNTER (OUTPATIENT)
Dept: BEHAVIORAL HEALTH | Facility: CLINIC | Age: 26
End: 2021-10-04
Attending: PSYCHIATRY & NEUROLOGY
Payer: COMMERCIAL

## 2021-10-04 PROCEDURE — 90853 GROUP PSYCHOTHERAPY: CPT | Mod: 95 | Performed by: COUNSELOR

## 2021-10-04 PROCEDURE — G0177 OPPS/PHP; TRAIN & EDUC SERV: HCPCS | Mod: GT

## 2021-10-04 NOTE — GROUP NOTE
Psychoeducation Group Note    PATIENT'S NAME: Nimisha Jasso  MRN:   [id number]  :   1995  ACCT. NUMBER: [id number]  DATE OF SERVICE: 10/04/21  START TIME:  2:00 PM  END TIME:  2:50 PM  FACILITATOR: Nehemiah Freeman OTR/L  TOPIC: MH Life Skills Group: Resiliency Development                                    Service Modality:  Video Visit     Telemedicine Visit: The patient's condition can be safely assessed and treated via synchronous audio and visual telemedicine encounter.      Reason for Telemedicine Visit: Services only offered telehealth    Originating Site (Patient Location): Patient's home    Distant Site (Provider Location): Provider Remote Setting- Home Office    Consent:  The patient/guardian has verbally consented to: the potential risks and benefits of telemedicine (video visit) versus in person care; bill my insurance or make self-payment for services provided; and responsibility for payment of non-covered services.     Mode of Communication:  Video Conference via Medical Zoom    As the provider I attest to compliance with applicable laws and regulations related to telemedicine.       [facility] Adult Mental Health Day Treatment  TRACK: 1B    NUMBER OF PARTICIPANTS: 3    Summary of Group / Topics Discussed:  Resiliency Development:  Coping Skills(Characteristics of Resiliency to Manage Stress): Patients were taught how to identify stressors, signs of stress, coping skills, and prevention strategies for overall stress management.  Patients were given the opportunity to identify both ongoing and acute mental health symptoms and how to effectively manage these symptoms by developing an effective aftercare plan.  Patients increased awareness of community based resources.    Patient Session Goals / Objectives:    Identified how using coping skills can be used for symptom and stress management       Improved awareness of individualed symptoms and stressors and how to effectively cope      Established a relapse prevention plan to practice these skills in their own environments    Practiced and reflected on how to generalize taught skills to their everyday life          Patient Participation / Response:  Fully participated with the group by sharing personal reflections / insights and openly received / provided feedback with other participants.    Demonstrated understanding of content through handout/video/group discussion , Verbalized understanding of content and Patient would benefit from additional opportunities to practice the content to be able to generalize it to their everyday life with increased intentionality, consistency, and efficacy in support of their psychiatric recovery    Treatment Plan:  Patient has a current master individualized treatment plan.  See Epic treatment plan for more information.    Nehemiah Freeman, OTR/L

## 2021-10-04 NOTE — GROUP NOTE
"Process Group Note    PATIENT'S NAME: iNmisha Jasso  MRN:   [id number]  :   1995  ACCT. NUMBER: [id number]  DATE OF SERVICE: 10/04/21  START TIME:  1:00 PM  END TIME:  1:50 PM  FACILITATOR: Tyra Ricci [facility]  TOPIC:  Process Group    Diagnoses:  296.32 (F33.1) Major Depressive Disorder, Recurrent Episode, Moderate  300.02 (F41.1) Generalized Anxiety Disorder      [facility] Mental [facility] Day Treatment  TRACK: 1B    NUMBER OF PARTICIPANTS: 5                                      Service Modality:  Video Visit     Telemedicine Visit: The patient's condition can be safely assessed and treated via synchronous audio and visual telemedicine encounter.      Reason for Telemedicine Visit: Services only offered telehealth    Originating Site (Patient Location): Patient's home    Distant Site (Provider Location): Provider Remote Setting- Home Office    Consent:  The patient/guardian has verbally consented to: the potential risks and benefits of telemedicine (video visit) versus in person care; bill my insurance or make self-payment for services provided; and responsibility for payment of non-covered services.     Patient would like the video invitation sent by:  My Chart    Mode of Communication:  Video Conference via Medical Zoom    As the provider I attest to compliance with applicable laws and regulations related to telemedicine.                Data:    Session content: At the start of this group, patients were invited to check in by identifying themselves, describing their current emotional status, and identifying issues to address in this group.   Area(s) of treatment focus addressed in this session included Symptom Management and Personal Safety.    Keith reported feeling \"surprisingly okay\" today.  Their goal for the day is to have a good show tonight and make sure they bring some food and their ice pack with.  Patient declined additional process time but contributed to group discussion " and provided feedback and support to peers.      Therapeutic Interventions/Treatment Strategies:  Psychotherapist offered support, feedback and validation and reinforced use of skills.    Assessment:    Patient response:   Patient responded to session by accepting feedback, giving feedback and listening    Possible barriers to participation / learning include: and no barriers identified    Health Issues:   None reported       Substance Use Review:   Substance Use: No active concerns identified.    Mental Status/Behavioral Observations  Appearance:   Appropriate   Eye Contact:   Good   Psychomotor Behavior: Normal   Attitude:   Cooperative   Orientation:   All  Speech   Rate / Production: Normal    Volume:  Normal   Mood:    Anxious  Depressed   Affect:    Appropriate   Thought Content:   Clear  Thought Form:  Coherent  Logical     Insight:    Good     Plan:     Safety Plan: No current safety concerns identified.  Recommended that patient call 911 or go to the local ED should there be a change in any of these risk factors.     Barriers to treatment: None identified    Patient Contracts (see media tab):  None    Substance Use: Not addressed in session     Continue or Discharge: Patient will continue in Adult Day Treatment (ADT)  as planned. Patient is likely to benefit from learning and using skills as they work toward the goals identified in their treatment plan.      Tyra Ricci, [facility]  October 4, 2021

## 2021-10-06 ENCOUNTER — HOSPITAL ENCOUNTER (OUTPATIENT)
Dept: BEHAVIORAL HEALTH | Facility: CLINIC | Age: 26
End: 2021-10-06
Attending: PSYCHIATRY & NEUROLOGY
Payer: COMMERCIAL

## 2021-10-06 PROCEDURE — 90853 GROUP PSYCHOTHERAPY: CPT | Mod: 95 | Performed by: COUNSELOR

## 2021-10-06 PROCEDURE — G0177 OPPS/PHP; TRAIN & EDUC SERV: HCPCS | Mod: GT

## 2021-10-06 NOTE — GROUP NOTE
Psychoeducation Group Note    PATIENT'S NAME: Nimisha Jasso  MRN:   [id number]  :   1995  ACCT. NUMBER: [id number]  DATE OF SERVICE: 10/06/21  START TIME:  3:00 PM  END TIME:  3:50 PM  FACILITATOR: Malgorzata Fox RN  TOPIC: VANESSA RN Group: Health Maintenance  [facility] Adult Mental Health Day Treatment  TRACK: 1B    NUMBER OF PARTICIPANTS: 2 (no charge)    Summary of Group / Topics Discussed:  Health Maintenance: Goal Setting: Meaningful goals can bring a sense of direction and purpose in life.  They also highlight our most important values. Patients were assisted by instructor to identify short term goals to promote their mental health recovery and improve overall health and wellness. Patients were educated on SMART goal setting framework as a strategy to increase outcomes and promote success.    Patient Session Goals / Objectives:  ? Explained the key concepts of SMART goal setting framework  ? Identified three goals successfully using SMART goal setting framework  ? Reviewed concept of balance in wellness as it pertains to goal setting                                      Service Modality:  Video Visit     Telemedicine Visit: The patient's condition can be safely assessed and treated via synchronous audio and visual telemedicine encounter.      Reason for Telemedicine Visit: Services only offered telehealth    Originating Site (Patient Location): Patient's home    Distant Site (Provider Location): Provider Remote Setting- Home Office    Consent:  The patient/guardian has verbally consented to: the potential risks and benefits of telemedicine (video visit) versus in person care; bill my insurance or make self-payment for services provided; and responsibility for payment of non-covered services.     Patient would like the video invitation sent by:  My Chart    Mode of Communication:  Video Conference via Medical Zoom    As the provider I attest to compliance with applicable laws and regulations  related to telemedicine.           Patient Participation / Response:  Fully participated with the group by sharing personal reflections / insights and openly received / provided feedback with other participants.    Verbalized understanding of health maintenance topic    Treatment Plan:  Patient has a current master individualized treatment plan.  See Epic treatment plan for more information.    Malgorzata Fox RN

## 2021-10-06 NOTE — GROUP NOTE
"Process Group Note    PATIENT'S NAME: Nimisha Jasso  MRN:   [id number]  :   1995  ACCT. NUMBER: [id number]  DATE OF SERVICE: 10/06/21  START TIME:  2:00 PM  END TIME:  2:50 PM  FACILITATOR: Tyra Ricci [facility]  TOPIC:  Process Group    Diagnoses:  296.32 (F33.1) Major Depressive Disorder, Recurrent Episode, Moderate  300.02 (F41.1) Generalized Anxiety Disorder      [facility] Mental [facility] Day Treatment  TRACK: 1B    NUMBER OF PARTICIPANTS: 4                                      Service Modality:  Video Visit     Telemedicine Visit: The patient's condition can be safely assessed and treated via synchronous audio and visual telemedicine encounter.      Reason for Telemedicine Visit: Services only offered telehealth    Originating Site (Patient Location): Patient's home    Distant Site (Provider Location): Provider Remote Setting- Home Office    Consent:  The patient/guardian has verbally consented to: the potential risks and benefits of telemedicine (video visit) versus in person care; bill my insurance or make self-payment for services provided; and responsibility for payment of non-covered services.     Patient would like the video invitation sent by:  My Chart    Mode of Communication:  Video Conference via Medical Zoom    As the provider I attest to compliance with applicable laws and regulations related to telemedicine.                Data:    Session content: At the start of this group, patients were invited to check in by identifying themselves, describing their current emotional status, and identifying issues to address in this group.   Area(s) of treatment focus addressed in this session included Symptom Management and Personal Safety.    Keith reported that they are \"not doing well\" today.  Their goal for the day is to \"just feel a little happier today.\"  Patient declined additional process time but contributed to group discussion and provided feedback and support to peers.     "   Therapeutic Interventions/Treatment Strategies:  Psychotherapist offered support, feedback and validation and reinforced use of skills.    Assessment:    Patient response:   Patient responded to session by accepting feedback, giving feedback and listening    Possible barriers to participation / learning include: and no barriers identified    Health Issues:   None reported       Substance Use Review:   Substance Use: No active concerns identified.    Mental Status/Behavioral Observations  Appearance:   Appropriate   Eye Contact:   Good   Psychomotor Behavior: Normal   Attitude:   Cooperative   Orientation:   All  Speech   Rate / Production: Normal    Volume:  Normal   Mood:    Anxious  Depressed   Affect:    Appropriate   Thought Content:   Safety reports  presence of suicidal ideation passive suicidal ideation   Thought Form:  Coherent  Logical     Insight:    Good     Plan:     Safety Plan: No current safety concerns identified.  Recommended that patient call 911 or go to the local ED should there be a change in any of these risk factors.     Barriers to treatment: None identified    Patient Contracts (see media tab):  None    Substance Use: Not addressed in session     Continue or Discharge: Patient will continue in Adult Day Treatment (ADT)  as planned. Patient is likely to benefit from learning and using skills as they work toward the goals identified in their treatment plan.      Tyra Ricci, [facility]  October 6, 2021

## 2021-10-06 NOTE — GROUP NOTE
Psychoeducation Group Note    PATIENT'S NAME: Nimisha Jasso  MRN:   [id number]  :   1995  ACCT. NUMBER: [id number]  DATE OF SERVICE: 10/06/21  START TIME:  1:00 PM  END TIME:  1:50 PM  FACILITATOR: Nehemiah Freeman OTR/L  TOPIC: MH Life Skills Group: Resiliency Development                                    Service Modality:  Video Visit     Telemedicine Visit: The patient's condition can be safely assessed and treated via synchronous audio and visual telemedicine encounter.      Reason for Telemedicine Visit: Services only offered telehealth    Originating Site (Patient Location): Patient's home    Distant Site (Provider Location): Provider Remote Setting- Home Office    Consent:  The patient/guardian has verbally consented to: the potential risks and benefits of telemedicine (video visit) versus in person care; bill my insurance or make self-payment for services provided; and responsibility for payment of non-covered services.     Mode of Communication:  Video Conference via Medical Zoom    As the provider I attest to compliance with applicable laws and regulations related to telemedicine.       [facility] Adult Mental Health Day Treatment  TRACK: 1B    NUMBER OF PARTICIPANTS: 4    Summary of Group / Topics Discussed:  Resiliency Development:  Coping Skills: Personal Recovery Outcome Measure: Patients were taught how to identify coping strategies and routines that they can adopt and use for management with focus on a balance between physical, emotional, social and spiritual strategies.    Patient Session Goals / Objectives:    Identified personal definitions of recovery for effectively managing both mental health and substance abuse/abuse symptoms     Improved awareness of the process of recovery and skills and strategies that support this       Established a plan for practice of these skills in their own environments    Practiced and reflected on how to generalize taught skills to their  everyday life        Patient Participation / Response:  Fully participated with the group by sharing personal reflections / insights and openly received / provided feedback with other participants.    Demonstrated understanding of content through handout/group discussion , Verbalized understanding of content and Patient would benefit from additional opportunities to practice the content to be able to generalize it to their everyday life with increased intentionality, consistency, and efficacy in support of their psychiatric recovery    Treatment Plan:  Patient has a current master individualized treatment plan.  See Epic treatment plan for more information.    Nehemiah Freeman, OTR/L

## 2021-10-07 ENCOUNTER — HOSPITAL ENCOUNTER (OUTPATIENT)
Dept: BEHAVIORAL HEALTH | Facility: CLINIC | Age: 26
End: 2021-10-07
Attending: PSYCHIATRY & NEUROLOGY
Payer: COMMERCIAL

## 2021-10-07 PROCEDURE — 90853 GROUP PSYCHOTHERAPY: CPT | Mod: GT | Performed by: COUNSELOR

## 2021-10-07 PROCEDURE — G0177 OPPS/PHP; TRAIN & EDUC SERV: HCPCS | Mod: 95

## 2021-10-07 PROCEDURE — 90853 GROUP PSYCHOTHERAPY: CPT | Mod: 95 | Performed by: COUNSELOR

## 2021-10-07 NOTE — GROUP NOTE
"Process Group Note    PATIENT'S NAME: Nimisha Jasso  MRN:   [id number]  :   1995  ACCT. NUMBER: [id number]  DATE OF SERVICE: 10/07/21  START TIME:  1:00 PM  END TIME:  1:50 PM  FACILITATOR: Tyra Ricci [facility]  TOPIC:  Process Group    Diagnoses:  296.32 (F33.1) Major Depressive Disorder, Recurrent Episode, Moderate  300.02 (F41.1) Generalized Anxiety Disorder      [facility] Mental [facility] Day Treatment  TRACK: 1B    NUMBER OF PARTICIPANTS: 3                                      Service Modality:  Video Visit     Telemedicine Visit: The patient's condition can be safely assessed and treated via synchronous audio and visual telemedicine encounter.      Reason for Telemedicine Visit: Services only offered telehealth    Originating Site (Patient Location): Patient's home    Distant Site (Provider Location): Provider Remote Setting- Home Office    Consent:  The patient/guardian has verbally consented to: the potential risks and benefits of telemedicine (video visit) versus in person care; bill my insurance or make self-payment for services provided; and responsibility for payment of non-covered services.     Patient would like the video invitation sent by:  My Chart    Mode of Communication:  Video Conference via Medical Zoom    As the provider I attest to compliance with applicable laws and regulations related to telemedicine.                Data:    Session content: At the start of this group, patients were invited to check in by identifying themselves, describing their current emotional status, and identifying issues to address in this group.   Area(s) of treatment focus addressed in this session included Symptom Management and Personal Safety.    Keith reported feeling \"fine\" today.  Their goal for the day is to have a good performance tonight.  Patient declined additional process time but contributed to group discussion and provided feedback and support to peers.      Therapeutic " Interventions/Treatment Strategies:  Psychotherapist offered support, feedback and validation and reinforced use of skills.    Assessment:    Patient response:   Patient responded to session by accepting feedback, giving feedback and listening    Possible barriers to participation / learning include: and no barriers identified    Health Issues:   None reported       Substance Use Review:   Substance Use: No active concerns identified.    Mental Status/Behavioral Observations  Appearance:   Appropriate   Eye Contact:   Good   Psychomotor Behavior: Normal   Attitude:   Cooperative   Orientation:   All  Speech   Rate / Production: Normal    Volume:  Normal   Mood:    Anxious  Depressed   Affect:    Appropriate   Thought Content:   Safety reports  presence of suicidal ideation passive suicidal ideation   Thought Form:  Coherent  Logical     Insight:    Good     Plan:     Safety Plan: Committed to safety and agreed to follow previously developed safety coping plan.      Barriers to treatment: None identified    Patient Contracts (see media tab):  None    Substance Use: Not addressed in session     Continue or Discharge: Patient will continue in Adult Day Treatment (ADT)  as planned. Patient is likely to benefit from learning and using skills as they work toward the goals identified in their treatment plan.      Tyra Ricci, [facility]  October 7, 2021

## 2021-10-07 NOTE — GROUP NOTE
Patient: Viviana Bowen    Procedure: Procedure(s):  ARTHROPLASTY, HIP, TOTAL       Diagnosis:Hip fracture, right, closed, initial encounter (H) [S72.001A]  Diagnosis Additional Information: No value filed.    Anesthesia Type:  Spinal    Note:  Disposition: Inpatient   Postop Pain Control: Uneventful            Sign Out: Well controlled pain   PONV: No   Neuro/Psych: Uneventful            Sign Out: Acceptable/Baseline neuro status   Airway/Respiratory: Uneventful            Sign Out: Acceptable/Baseline resp. status   CV/Hemodynamics: Uneventful            Sign Out: Acceptable CV status; No obvious hypovolemia; No obvious fluid overload   Other NRE: NONE   DID A NON-ROUTINE EVENT OCCUR? No           Last vitals:  Vitals Value Taken Time   /61 10/07/21 1630   Temp 36.6  C (97.9  F) 10/07/21 1630   Pulse 71 10/07/21 1642   Resp 25 10/07/21 1642   SpO2 96 % 10/07/21 1644   Vitals shown include unvalidated device data.    Electronically Signed By: Nicolasa Gomez MD  October 7, 2021  4:59 PM   Process Group Note    PATIENT'S NAME: Nimisha Jasso  MRN:   [id number]  :   1995  ACCT. NUMBER: [id number]  DATE OF SERVICE: 21  START TIME:  9:00 AM  END TIME:  9:50 AM  FACILITATOR: Fatoumata Pagan LMFT  TOPIC:  Process Group    Diagnoses:  Major Depressive Disorder, Recurrent Episode, Moderate   Generalized Anxiety Disorder.    [facility] Mental Health Day Treatment  TRACK: 5A & 1A merge    NUMBER OF PARTICIPANTS: 7                                      Service Modality:  Video Visit     Telemedicine Visit: The patient's condition can be safely assessed and treated via synchronous audio and visual telemedicine encounter.      Reason for Telemedicine Visit: Services only offered telehealth    Originating Site (Patient Location): Patient's home    Distant Site (Provider Location): Provider Remote Setting- Home Office    Consent:  The patient/guardian has verbally consented to: the potential risks and benefits of telemedicine (video visit) versus in person care; bill my insurance or make self-payment for services provided; and responsibility for payment of non-covered services.     Patient would like the video invitation sent by:  My Chart    Mode of Communication:  Video Conference via Medical Zoom    As the provider I attest to compliance with applicable laws and regulations related to telemedicine.                Data:    Session content: At the start of this group, patients were invited to check in by identifying themselves, describing their current emotional status, and identifying issues to address in this group.   Area(s) of treatment focus addressed in this session included Symptom Management, Personal Safety, Community Resources/Discharge Planning and Develop Socialization / Interpersonal Relationship Skills.    Keith reported feeling tired today. Patient identified the following goal(s) to work towards today: rehearsal tonight. Patient plans to use the following skills to  achieve their goal: look over lines and practice. Patient anticipates the following barriers that may interfere with achieving their goal: anxiety. Denies safety concerns, denies chemical use, and reports taking their medications as prescribed. Patient reports feeling proud of/grateful for: finding a program to be in. Patient asked for the following supports from the group today: none.     Therapeutic Interventions/Treatment Strategies:  Psychotherapist offered support, feedback and validation and reinforced use of skills. Treatment modalities used include Motivational Interviewing, Cognitive Behavioral Therapy and Dialectical Behavioral Therapy. Interventions include Relationship Skills: get to know you game.    Assessment:    Patient response:   Patient responded to session by listening, focusing on goals and being attentive    Possible barriers to participation / learning include: and no barriers identified    Health Issues:   None reported       Substance Use Review:   Substance Use: No active concerns identified.    Mental Status/Behavioral Observations  Appearance:   Appropriate   Eye Contact:   Good   Psychomotor Behavior: Normal   Attitude:   Cooperative   Orientation:   All  Speech   Rate / Production: Normal    Volume:  Normal   Mood:    Normal  Affect:    Appropriate   Thought Content:   Clear  Thought Form:  Coherent  Logical     Insight:    Good  and Fair     Plan:     Safety Plan: No current safety concerns identified.  Recommended that patient call 911 or go to the local ED should there be a change in any of these risk factors.     Barriers to treatment: None identified    Patient Contracts (see media tab):  None    Substance Use: Not addressed in session     Continue or Discharge: Patient will continue in Adult Day Treatment (ADT)  as planned. Patient is likely to benefit from learning and using skills as they work toward the goals identified in their treatment plan.      Fatoumata Pagan  [facility]  August 30, 2021

## 2021-10-07 NOTE — GROUP NOTE
Psychoeducation Group Note    PATIENT'S NAME: Nimisha Jasso  MRN:   [id number]  :   1995  ACCT. NUMBER: [id number]  DATE OF SERVICE: 10/07/21  START TIME:  3:00 PM  END TIME:  3:50 PM  FACILITATOR: Malgorzata Fox RN  TOPIC: VANESSA RN Group: Mental Health Maintenance  [facility] Adult Mental Health Day Treatment  TRACK: 1B    NUMBER OF PARTICIPANTS: 3    Summary of Group / Topics Discussed:  Mental Health Maintenance:  Assessments of Strengths: Patients completed a self-reflection on personal strengths worksheet. The concept of personal strength as it relates to resilience were explored. Patients shared responses with the group and participated in discussion.     Patient Session Goals / Objectives:  ? Patients identified 1-3 qualities they consider a personal strength.  ? Patients understood the concept of personal strengths and the connection it has to resiliency  ? Patients identified 3 core components of self compassion                                 Service Modality:  Video Visit     Telemedicine Visit: The patient's condition can be safely assessed and treated via synchronous audio and visual telemedicine encounter.      Reason for Telemedicine Visit: Services only offered telehealth    Originating Site (Patient Location): Patient's home    Distant Site (Provider Location): Provider Remote Setting- Home Office    Consent:  The patient/guardian has verbally consented to: the potential risks and benefits of telemedicine (video visit) versus in person care; bill my insurance or make self-payment for services provided; and responsibility for payment of non-covered services.     Patient would like the video invitation sent by:  My Chart    Mode of Communication:  Video Conference via Medical Zoom    As the provider I attest to compliance with applicable laws and regulations related to telemedicine.           Patient Participation / Response:  Fully participated with the group by sharing personal  reflections / insights and openly received / provided feedback with other participants.    Identified / Expressed personal readiness to practice skills    Treatment Plan:  Patient has a current master individualized treatment plan.  See Epic treatment plan for more information.    Malgorzata Fox RN

## 2021-10-07 NOTE — GROUP NOTE
Psychotherapy Group Note    PATIENT'S NAME: Nimisha Jasso  MRN:   [id number]  :   1995  ACCT. NUMBER: [id number]  DATE OF SERVICE: 10/07/21  START TIME:  2:00 PM  END TIME:  2:50 PM  FACILITATOR: Tyra Ricci [facility]  TOPIC:  EBP Group: Behavioral Activation  [facility] Adult Mental Health Day Treatment  TRACK: 1B    NUMBER OF PARTICIPANTS: 3                                      Service Modality:  Video Visit     Telemedicine Visit: The patient's condition can be safely assessed and treated via synchronous audio and visual telemedicine encounter.      Reason for Telemedicine Visit: Services only offered telehealth    Originating Site (Patient Location): Patient's home    Distant Site (Provider Location): Provider Remote Setting- Home Office    Consent:  The patient/guardian has verbally consented to: the potential risks and benefits of telemedicine (video visit) versus in person care; bill my insurance or make self-payment for services provided; and responsibility for payment of non-covered services.     Patient would like the video invitation sent by:  My Chart    Mode of Communication:  Video Conference via Medical Zoom    As the provider I attest to compliance with applicable laws and regulations related to telemedicine.          Summary of Group / Topics Discussed:  Behavioral Activation: Motivation and Procrastination: Patients explored how they currently spend their time, identifying thoughts and feelings that are motivating and serve to increase desired behaviors.  They also examined behaviors that contribute to procrastination.  Different types of procrastination behaviors were identified, and strategies to reduce individual procrastination and increase motivation were explored and practiced.  Patients identified ways to increase goal-directed activities to enhance mood and reduce symptoms.        Patient Session Goals / Objectives:    Identify current patterns of procrastination  behavior and how they influence thoughts and moods, and inhibit motivation.    Identify behaviors that can be implemented that contribute to improving thoughts and feelings, motivation, and reduce symptoms.    Identify and develop a plan to increase activities that promote a sense of accomplishment and competence.    Practice scheduling positive activities / behaviors into daily routines.      Patient Participation / Response:  Fully participated with the group by sharing personal reflections / insights and openly received / provided feedback with other participants.    Demonstrated understanding of topics discussed through group discussion and participation, Expressed understanding of the relationship between behaviors, thoughts, and feelings and Shared experiences and challenges with making behavioral changes    Treatment Plan:  Patient has a current master individualized treatment plan.  See Epic treatment plan for more information.    Tyra Ricci, [facility]

## 2021-10-07 NOTE — GROUP NOTE
Psychotherapy Group Note    PATIENT'S NAME: Nimisha Jasso  MRN:   [id number]  :   1995  ACCT. NUMBER: [id number]  DATE OF SERVICE: 10/07/21  START TIME: 10:00 AM  END TIME:  2:50 PM  FACILITATOR: Tyra Ricci LPCC  TOPIC:  EBP Group: Behavioral Activation  [facility] Adult Mental Health Day Treatment  TRACK: 1B    NUMBER OF PARTICIPANTS: 3                                      Service Modality:  Video Visit     Telemedicine Visit: The patient's condition can be safely assessed and treated via synchronous audio and visual telemedicine encounter.      Reason for Telemedicine Visit: Services only offered telehealth    Originating Site (Patient Location): Patient's home    Distant Site (Provider Location): Provider Remote Setting- Home Office    Consent:  The patient/guardian has verbally consented to: the potential risks and benefits of telemedicine (video visit) versus in person care; bill my insurance or make self-payment for services provided; and responsibility for payment of non-covered services.     Patient would like the video invitation sent by:  My Chart    Mode of Communication:  Video Conference via Medical Zoom    As the provider I attest to compliance with applicable laws and regulations related to telemedicine.        Summary of Group / Topics Discussed:  Behavioral Activation: Motivation and Procrastination: Patients explored how they currently spend their time, identifying thoughts and feelings that are motivating and serve to increase desired behaviors.  They also examined behaviors that contribute to procrastination.  Different types of procrastination behaviors were identified, and strategies to reduce individual procrastination and increase motivation were explored and practiced.  Patients identified ways to increase goal-directed activities to enhance mood and reduce symptoms.        Patient Session Goals / Objectives:    Identify current patterns of procrastination behavior  and how they influence thoughts and moods, and inhibit motivation.    Identify behaviors that can be implemented that contribute to improving thoughts and feelings, motivation, and reduce symptoms.    Identify and develop a plan to increase activities that promote a sense of accomplishment and competence.    Practice scheduling positive activities / behaviors into daily routines.      Patient Participation / Response:  Fully participated with the group by sharing personal reflections / insights and openly received / provided feedback with other participants.    Demonstrated understanding of topics discussed through group discussion and participation, Expressed understanding of the relationship between behaviors, thoughts, and feelings, Shared experiences and challenges with making behavioral changes and Identified barriers to change    Treatment Plan:  Patient has a current master individualized treatment plan.  See Epic treatment plan for more information.    Tyra Ricci, [facility]

## 2021-10-11 ENCOUNTER — HOSPITAL ENCOUNTER (OUTPATIENT)
Dept: BEHAVIORAL HEALTH | Facility: CLINIC | Age: 26
End: 2021-10-11
Attending: PSYCHIATRY & NEUROLOGY
Payer: COMMERCIAL

## 2021-10-11 PROCEDURE — G0177 OPPS/PHP; TRAIN & EDUC SERV: HCPCS | Mod: 95

## 2021-10-11 PROCEDURE — 90853 GROUP PSYCHOTHERAPY: CPT | Mod: GT | Performed by: COUNSELOR

## 2021-10-11 NOTE — GROUP NOTE
"Process Group Note    PATIENT'S NAME: Nimisha Jasso  MRN:   [id number]  :   1995  ACCT. NUMBER: [id number]  DATE OF SERVICE: 10/11/21  START TIME:  1:00 PM  END TIME:  1:50 PM  FACILITATOR: Tyra Ricci [facility]  TOPIC:  Process Group    Diagnoses:  296.32 (F33.1) Major Depressive Disorder, Recurrent Episode, Moderate  300.02 (F41.1) Generalized Anxiety Disorder      [facility] Mental Health Day Treatment  TRACK: 1B    NUMBER OF PARTICIPANTS: 5                                      Service Modality:  Video Visit     Telemedicine Visit: The patient's condition can be safely assessed and treated via synchronous audio and visual telemedicine encounter.      Reason for Telemedicine Visit: Services only offered telehealth    Originating Site (Patient Location): Patient's home    Distant Site (Provider Location): Provider Remote Setting- Home Office    Consent:  The patient/guardian has verbally consented to: the potential risks and benefits of telemedicine (video visit) versus in person care; bill my insurance or make self-payment for services provided; and responsibility for payment of non-covered services.     Patient would like the video invitation sent by:  My Chart    Mode of Communication:  Video Conference via Medical Zoom    As the provider I attest to compliance with applicable laws and regulations related to telemedicine.                Data:    Session content: At the start of this group, patients were invited to check in by identifying themselves, describing their current emotional status, and identifying issues to address in this group.   Area(s) of treatment focus addressed in this session included Symptom Management, Personal Safety and Abstinence/Relapse Prevention.    Keith reported feeling \"worn out\" because she has been doing a lot of socializing lately.  Her goal for the day is to work on managing her anxiety.  She has been doing a lot more \"social drinking\" lately and it has " "sometimes been increasing her anxiety. 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  She reported feeling \"stuck\" because she really likes being in this company but knowing this about the director is making her feel uncomfortable    Therapeutic Interventions/Treatment Strategies:  Psychotherapist offered support, feedback and validation and reinforced use of skills. Treatment modalities used include Motivational Interviewing, Cognitive Behavioral Therapy and Dialectical Behavioral Therapy. Interventions include Other: Assisted patient  in finding ways to adapt functioning in light of past traumatic experiences and Explored with patient how life transitions may impact mental health and functioning .    Assessment:  .  Patient response:   Patient responded to session by accepting feedback, giving feedback and listening    Possible barriers to participation / learning include: and no barriers identified    Health Issues:   None reported       Substance Use Review:   Substance Use: No active concerns identified.    Mental Status/Behavioral Observations  Appearance:   Appropriate   Eye Contact:   Good   Psychomotor Behavior: Normal   Attitude:   Cooperative   Orientation:   All  Speech   Rate / Production: Normal    Volume:  Normal   Mood:    Anxious  Depressed   Affect:    Appropriate   Thought Content:   Clear  Thought Form:  Coherent  Logical     Insight:    Good     Plan:     Safety Plan: No current safety concerns identified.  Recommended that patient call 911 or go to the local ED should there be a change in any of these risk factors.     Barriers to treatment: None identified    Patient Contracts (see media tab):  None    Substance Use: Not addressed in session     Continue or Discharge: Patient will continue in Adult Day Treatment (ADT)  as planned. Patient is " likely to benefit from learning and using skills as they work toward the goals identified in their treatment plan.      Tyra Ricci, [facility]  October 11, 2021

## 2021-10-11 NOTE — GROUP NOTE
Psychoeducation Group Note    PATIENT'S NAME: Nimisha Jasso  MRN:   [id number]  :   1995  ACCT. NUMBER: [id number]  DATE OF SERVICE: 10/11/21  START TIME:  2:00 PM  END TIME:  2:50 PM  FACILITATOR: Nehemiah Freeman OTR/L  TOPIC: MH Life Skills Group: Resiliency Development                                    Service Modality:  Video Visit     Telemedicine Visit: The patient's condition can be safely assessed and treated via synchronous audio and visual telemedicine encounter.      Reason for Telemedicine Visit: Services only offered telehealth    Originating Site (Patient Location): Patient's home    Distant Site (Provider Location): Provider Remote Setting- Home Office    Consent:  The patient/guardian has verbally consented to: the potential risks and benefits of telemedicine (video visit) versus in person care; bill my insurance or make self-payment for services provided; and responsibility for payment of non-covered services.     Mode of Communication:  Video Conference via Medical Zoom    As the provider I attest to compliance with applicable laws and regulations related to telemedicine.       [facility] Mental Health Day Treatment  TRACK: 1B    NUMBER OF PARTICIPANTS: 4    Summary of Group / Topics Discussed:  Resiliency Development:  Self Esteem and Self Compassion: Strategies for Self-Improvement: Patients were given time for reflection and built self awareness around components of self compassion and how it relates to self esteem and overall functioning.  Patients were also given the opportunity to improve self efficacy, self sufficiency, quality of life and a sense of mastery and competency by identifying what is good and to instill hope. Patients were taught about the importance of self kindness and ways to challenge negative thinking.      Patient Session Goals / Objectives:    Identified personal strengths and qualities about themselves as a way to provide hope and build  self-compassion as a way to effectively manage both mental health and substance abuse/abuse symptoms     Improved awareness of positive qualities and how these contribute to the process of recovery        Established a plan for practice of these skills in their own environments    Practiced and reflected on how to generalize taught skills to their everyday life        Patient Participation / Response:  Fully participated with the group by sharing personal reflections / insights and openly received / provided feedback with other participants.    Demonstrated understanding of content through video/handout/discussion , Verbalized understanding of content and Patient would benefit from additional opportunities to practice the content to be able to generalize it to their everyday life with increased intentionality, consistency, and efficacy in support of their psychiatric recovery    Treatment Plan:  Patient has a current master individualized treatment plan.  See Epic treatment plan for more information.    Nehemiah Freeman, OTR/L

## 2021-10-13 ENCOUNTER — HOSPITAL ENCOUNTER (OUTPATIENT)
Dept: BEHAVIORAL HEALTH | Facility: CLINIC | Age: 26
End: 2021-10-13
Attending: PSYCHIATRY & NEUROLOGY
Payer: COMMERCIAL

## 2021-10-13 PROCEDURE — 90853 GROUP PSYCHOTHERAPY: CPT | Mod: GT | Performed by: COUNSELOR

## 2021-10-13 PROCEDURE — G0177 OPPS/PHP; TRAIN & EDUC SERV: HCPCS | Mod: 95

## 2021-10-13 NOTE — GROUP NOTE
Psychoeducation Group Note    PATIENT'S NAME: Nimisha Jasso  MRN:   [id number]  :   1995  ACCT. NUMBER: [id number]  DATE OF SERVICE: 10/13/21  START TIME:  3:00 PM  END TIME:  3:50 PM  FACILITATOR: Malgorzata Fox RN  TOPIC: VANESSA RN Group: Mental Health Maintenance  [facility] Adult Partial Hospitalization Program  TRACK: 1B    NUMBER OF PARTICIPANTS: 4    Summary of Group / Topics Discussed:  Mental Health Maintenance:  Stigma: In this group patients explored stigma surrounding a mental health diagnosis.  The group discussed the way stigma impacts their own life, and discussed strategies to reduce. The relationship between physical and mental health were also explored in the context of healthcare access, treatment, and support.    Patient Session Goals / Objectives:  ? Patients identified the importance of practicing emotional hygiene  ? Patients identified ways to decrease the  impact of stigma in their own life                                      Service Modality:  Video Visit     Telemedicine Visit: The patient's condition can be safely assessed and treated via synchronous audio and visual telemedicine encounter.      Reason for Telemedicine Visit: Services only offered telehealth    Originating Site (Patient Location): Patient's home    Distant Site (Provider Location): Provider Remote Setting- Home Office    Consent:  The patient/guardian has verbally consented to: the potential risks and benefits of telemedicine (video visit) versus in person care; bill my insurance or make self-payment for services provided; and responsibility for payment of non-covered services.     Patient would like the video invitation sent by:  My Chart    Mode of Communication:  Video Conference via Medical Zoom    As the provider I attest to compliance with applicable laws and regulations related to telemedicine.           Patient Participation / Response:  Fully participated with the group by sharing personal  reflections / insights and openly received / provided feedback with other participants.    Identified / Expressed personal readiness to practice skills    Treatment Plan:  Patient has a current master individualized treatment plan.  See Epic treatment plan for more information.    Malgorzata Fox RN

## 2021-10-13 NOTE — GROUP NOTE
Psychoeducation Group Note    PATIENT'S NAME: Nimisha Jasso  MRN:   [id number]  :   1995  ACCT. NUMBER: [id number]  DATE OF SERVICE: 10/13/21  START TIME:  1:00 PM  END TIME:  1:50 PM  FACILITATOR: Nehemiah Freeman OTR/L  TOPIC: MH Life Skills Group: Resiliency Development                                    Service Modality:  Video Visit     Telemedicine Visit: The patient's condition can be safely assessed and treated via synchronous audio and visual telemedicine encounter.      Reason for Telemedicine Visit: Services only offered telehealth    Originating Site (Patient Location): Patient's home    Distant Site (Provider Location): Provider Remote Setting- Home Office    Consent:  The patient/guardian has verbally consented to: the potential risks and benefits of telemedicine (video visit) versus in person care; bill my insurance or make self-payment for services provided; and responsibility for payment of non-covered services.     Mode of Communication:  Video Conference via Medical Zoom    As the provider I attest to compliance with applicable laws and regulations related to telemedicine.       [facility] Adult Mental Health Day Treatment  TRACK: 1B    NUMBER OF PARTICIPANTS: 5    Summary of Group / Topics Discussed:  Resiliency Development:  Coping Skills: Personal Recovery Outcome Measure-Mental Health Weekly Check In: Patients were taught how to identify coping strategies and routines that they can adopt and use for management with focus on a balance between physical, emotional, social and spiritual strategies.    Patient Session Goals / Objectives:    Identified personal definitions of recovery for effectively managing both mental health and substance abuse/abuse symptoms     Improved awareness of the process of recovery and skills and strategies that support this       Established a plan for practice of these skills in their own environments    Practiced and reflected on how to  generalize taught skills to their everyday life        Patient Participation / Response:  Fully participated with the group by sharing personal reflections / insights and openly received / provided feedback with other participants.    Demonstrated understanding of content through handouts/discussion , Verbalized understanding of content and Patient would benefit from additional opportunities to practice the content to be able to generalize it to their everyday life with increased intentionality, consistency, and efficacy in support of their psychiatric recovery    Treatment Plan:  Patient has a current master individualized treatment plan.  See Epic treatment plan for more information.    Nehemiah Freeman, OTR/L

## 2021-10-13 NOTE — GROUP NOTE
"Process Group Note    PATIENT'S NAME: Nimisha Jasso  MRN:   [id number]  :   1995  ACCT. NUMBER: [id number]  DATE OF SERVICE: 10/13/21  START TIME:  2:00 PM  END TIME:  2:50 PM  FACILITATOR: Tyra Ricci [facility]  TOPIC:  Process Group    Diagnoses:  296.32 (F33.1) Major Depressive Disorder, Recurrent Episode, Moderate  300.02 (F41.1) Generalized Anxiety Disorder      [facility] Mental [facility] Day Treatment  TRACK: 1B    NUMBER OF PARTICIPANTS: 5                                      Service Modality:  Video Visit     Telemedicine Visit: The patient's condition can be safely assessed and treated via synchronous audio and visual telemedicine encounter.      Reason for Telemedicine Visit: Services only offered telehealth    Originating Site (Patient Location): Patient's home    Distant Site (Provider Location): Provider Remote Setting- Home Office    Consent:  The patient/guardian has verbally consented to: the potential risks and benefits of telemedicine (video visit) versus in person care; bill my insurance or make self-payment for services provided; and responsibility for payment of non-covered services.     Patient would like the video invitation sent by:  My Chart    Mode of Communication:  Video Conference via Medical Zoom    As the provider I attest to compliance with applicable laws and regulations related to telemedicine.            Data:    Session content: At the start of this group, patients were invited to check in by identifying themselves, describing their current emotional status, and identifying issues to address in this group.   Area(s) of treatment focus addressed in this session included Symptom Management and Personal Safety.    Keith reported feeling \"stagnant\" today.  Their goal for the day is to get laundry and dishes done. 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  The shared that historically, they struggled to reach out to friends due to low self/worth and negative core beliefs.  Keith was encouraged to continue to work on identifying and challenging those cognitive distortions and shown how to complete a thought record.     Therapeutic Interventions/Treatment Strategies:  Psychotherapist offered support, feedback and validation and reinforced use of skills. Treatment modalities used include Motivational Interviewing, Cognitive Behavioral Therapy and Dialectical Behavioral Therapy. Interventions include Cognitive Restructuring:  Assisted patient in formulating new neutral/positive alternatives to challenge less helpful / ineffective thoughts.    Assessment:    Patient response:   Patient responded to session by accepting feedback, giving feedback and listening    Possible barriers to participation / learning include: and no barriers identified    Health Issues:   None reported       Substance Use Review:   Substance Use: No active concerns identified.    Mental Status/Behavioral Observations  Appearance:   Appropriate   Eye Contact:   Good   Psychomotor Behavior: Normal   Attitude:   Cooperative   Orientation:   All  Speech   Rate / Production: Normal    Volume:  Normal   Mood:    Anxious  Depressed   Affect:    Appropriate   Thought Content:   Safety reports  presence of suicidal ideation passive suicidal ideation   Thought Form:  Coherent  Logical     Insight:    Good     Plan:     Safety Plan: Committed to safety and agreed to follow previously developed safety coping plan.      Barriers to treatment: None identified    Patient Contracts (see media tab):  None    Substance Use: Not addressed in session     Continue or Discharge: Patient will continue in Adult Day Treatment (ADT)  as planned. Patient is likely to benefit from learning and using skills as they work toward the goals identified in their treatment plan.      Tyra Ricci,  [facility]  October 13, 2021

## 2021-10-14 ENCOUNTER — HOSPITAL ENCOUNTER (OUTPATIENT)
Dept: BEHAVIORAL HEALTH | Facility: CLINIC | Age: 26
End: 2021-10-14
Attending: PSYCHIATRY & NEUROLOGY
Payer: COMMERCIAL

## 2021-10-14 PROCEDURE — 90853 GROUP PSYCHOTHERAPY: CPT | Mod: 95 | Performed by: COUNSELOR

## 2021-10-14 PROCEDURE — G0177 OPPS/PHP; TRAIN & EDUC SERV: HCPCS | Mod: GT

## 2021-10-14 NOTE — GROUP NOTE
Psychotherapy Group Note    PATIENT'S NAME: Nimisha Jasso  MRN:   [id number]  :   1995  ACCT. NUMBER: [id number]  DATE OF SERVICE: 10/14/21  START TIME:  2:00 PM  END TIME:  2:50 PM  FACILITATOR: Tyra Ricci LPCC  TOPIC: MH EBP Group: Emotions Management  [facility] Adult Mental Health Day Treatment  TRACK: 1B    NUMBER OF PARTICIPANTS: 7                                      Service Modality:  Video Visit     Telemedicine Visit: The patient's condition can be safely assessed and treated via synchronous audio and visual telemedicine encounter.      Reason for Telemedicine Visit: Services only offered telehealth    Originating Site (Patient Location): Patient's home    Distant Site (Provider Location): Provider Remote Setting- Home Office    Consent:  The patient/guardian has verbally consented to: the potential risks and benefits of telemedicine (video visit) versus in person care; bill my insurance or make self-payment for services provided; and responsibility for payment of non-covered services.     Patient would like the video invitation sent by:  My Chart    Mode of Communication:  Video Conference via Medical Zoom    As the provider I attest to compliance with applicable laws and regulations related to telemedicine.          Summary of Group / Topics Discussed:  Emotions Management: Guilt and Shame: Patients explored and shared personal experiences associated with feelings of guilt and shame.  Group explored how these feelings develop, what they mean to each individual, and how to increase acceptance and usefulness of these feelings.  Group members assisted one another to contextualize these concepts and promote healing.     Patient Session Goals / Objectives:    Discuss and review definitions and personal views/experiences with guilt and shame    Understand the differences between guilt and shame    Explore how feelings of guilt and shame impact functioning    Understand and practice  strategies to manage difficult emotions and move towards healing    Understand and normalize difficult emotions through group discussion    Understand the utility of guilt and shame    Target  unwanted  emotions for change      Patient Participation / Response:  Fully participated with the group by sharing personal reflections / insights and openly received / provided feedback with other participants.    Demonstrated understanding of topics discussed through group discussion and participation, Expressed understanding of the relevance / importance of emotions management skills at distressing times in life, Self-aware of experiences with difficult emotions, and strategies to employ to manage them and Demonstrated knowledge of when to consider applying a variety of emotions management skills in daily life    Treatment Plan:  Patient has a current master individualized treatment plan.  See Epic treatment plan for more information.    Tyra Ricci, [facility]

## 2021-10-14 NOTE — GROUP NOTE
Psychoeducation Group Note    PATIENT'S NAME: Nimisha Jasso  MRN:   [id number]  :   1995  ACCT. NUMBER: [id number]  DATE OF SERVICE: 10/14/21  START TIME:  3:00 PM  END TIME:  3:50 PM  FACILITATOR: Daniel Sanchez RN  TOPIC: MH RN Group: Health Maintenance                                    Service Modality:  Video Visit     Telemedicine Visit: The patient's condition can be safely assessed and treated via synchronous audio and visual telemedicine encounter.      Reason for Telemedicine Visit: Covid19    Originating Site (Patient Location): Patient's home    Distant Site (Provider Location): Provider Remote Setting- Home Office    Consent:  The patient/guardian has verbally consented to: the potential risks and benefits of telemedicine (video visit) versus in person care; bill my insurance or make self-payment for services provided; and responsibility for payment of non-covered services.     Patient would like the video invitation sent by:  My Chart    Mode of Communication:  Video Conference via Medical Zoom    As the provider I attest to compliance with applicable laws and regulations related to telemedicine.        [facility] Adult Mental Health Day Treatment  TRACK: 1b    NUMBER OF PARTICIPANTS: 5    Summary of Group / Topics Discussed:  Health Maintenance: Weekend planning: Patients were given time to complete a weekend plan of what they will do to promote wellness and sobriety over the weekend when they do not have the structure of group. Patients were encouraged to review progress on their treatment goals and were challenged to identify ways to work toward meeting them. Patients identified and discussed foreseeable barriers to success over the weekend and then developed a plan to overcome them. Patients reviewed their distress coping skills and social support network and discussed this with the group.       Patient Session Goals / Objectives:    ?    Identified activities to engage in  that promote balance in wellness  ?    Distinguished possible barriers to success over the weekend and created a plan to overcome them  ?    Listed distress coping skills and identified social support network to utilize if in crisis during the weekend          Patient Participation / Response:  Fully participated with the group by sharing personal reflections / insights and openly received / provided feedback with other participants.    Demonstrated understanding of topics discussed through group discussion and participation    Treatment Plan:  Patient has a current master individualized treatment plan.  See Epic treatment plan for more information.    Daniel Sanchez RN

## 2021-10-14 NOTE — GROUP NOTE
"Process Group Note    PATIENT'S NAME: Nimisha Jasso  MRN:   [id number]  :   1995  ACCT. NUMBER: [id number]  DATE OF SERVICE: 10/14/21  START TIME:  1:00 PM  END TIME:  1:50 PM  FACILITATOR: Tyra Ricci [facility]  TOPIC:  Process Group    Diagnoses:  296.32 (F33.1) Major Depressive Disorder, Recurrent Episode, Moderate  300.02 (F41.1) Generalized Anxiety Disorder      [facility] Day Treatment  TRACK: 1B    NUMBER OF PARTICIPANTS: 5                                      Service Modality:  Video Visit     Telemedicine Visit: The patient's condition can be safely assessed and treated via synchronous audio and visual telemedicine encounter.      Reason for Telemedicine Visit: Services only offered telehealth    Originating Site (Patient Location): Patient's home    Distant Site (Provider Location): Provider Remote Setting- Home Office    Consent:  The patient/guardian has verbally consented to: the potential risks and benefits of telemedicine (video visit) versus in person care; bill my insurance or make self-payment for services provided; and responsibility for payment of non-covered services.     Patient would like the video invitation sent by:  My Chart    Mode of Communication:  Video Conference via Medical Zoom    As the provider I attest to compliance with applicable laws and regulations related to telemedicine.                Data:    Session content: At the start of this group, patients were invited to check in by identifying themselves, describing their current emotional status, and identifying issues to address in this group.   Area(s) of treatment focus addressed in this session included Symptom Management and Personal Safety.    Keith reported feeling \"disconnected\" and \"detached\" today.  Their goal for the day have a good show tonight.  Patient declined additional process time but contributed to group discussion and provided feedback and support to " peers.      Therapeutic Interventions/Treatment Strategies:  Psychotherapist offered support, feedback and validation and reinforced use of skills.    Assessment:    Patient response:   Patient responded to session by accepting feedback, giving feedback and listening    Possible barriers to participation / learning include: and no barriers identified    Health Issues:   None reported       Substance Use Review:   Substance Use: No active concerns identified.    Mental Status/Behavioral Observations  Appearance:   Appropriate   Eye Contact:   Good   Psychomotor Behavior: Normal   Attitude:   Cooperative   Orientation:   All  Speech   Rate / Production: Normal    Volume:  Normal   Mood:    Depressed   Affect:    Appropriate   Thought Content:   Clear  Thought Form:  Coherent  Logical     Insight:    Good     Plan:     Safety Plan: No current safety concerns identified.  Recommended that patient call 911 or go to the local ED should there be a change in any of these risk factors.     Barriers to treatment: None identified    Patient Contracts (see media tab):  None    Substance Use: Not addressed in session     Continue or Discharge: Patient will continue in Adult Day Treatment (ADT)  as planned. Patient is likely to benefit from learning and using skills as they work toward the goals identified in their treatment plan.      Tyra Ricci, [facility]  October 14, 2021

## 2021-10-17 ENCOUNTER — HEALTH MAINTENANCE LETTER (OUTPATIENT)
Age: 26
End: 2021-10-17

## 2021-10-18 ENCOUNTER — HOSPITAL ENCOUNTER (OUTPATIENT)
Dept: BEHAVIORAL HEALTH | Facility: CLINIC | Age: 26
End: 2021-10-18
Attending: PSYCHIATRY & NEUROLOGY
Payer: COMMERCIAL

## 2021-10-18 PROCEDURE — G0177 OPPS/PHP; TRAIN & EDUC SERV: HCPCS | Mod: GT

## 2021-10-18 PROCEDURE — 90853 GROUP PSYCHOTHERAPY: CPT | Mod: 95 | Performed by: COUNSELOR

## 2021-10-18 PROCEDURE — 90853 GROUP PSYCHOTHERAPY: CPT | Mod: GT | Performed by: COUNSELOR

## 2021-10-18 NOTE — GROUP NOTE
Psychoeducation Group Note    PATIENT'S NAME: Nimisha Jasso  MRN:   [id number]  :   1995  ACCT. NUMBER: [id number]  DATE OF SERVICE: 10/18/21  START TIME:  2:00 PM  END TIME:  2:50 PM  FACILITATOR: Nehemiah Freeman OTR/L  TOPIC: MH Life Skills Group: Resiliency Development                                    Service Modality:  Video Visit     Telemedicine Visit: The patient's condition can be safely assessed and treated via synchronous audio and visual telemedicine encounter.      Reason for Telemedicine Visit: Services only offered telehealth    Originating Site (Patient Location): Patient's home    Distant Site (Provider Location): Provider Remote Setting- Home Office    Consent:  The patient/guardian has verbally consented to: the potential risks and benefits of telemedicine (video visit) versus in person care; bill my insurance or make self-payment for services provided; and responsibility for payment of non-covered services.     Mode of Communication:  Video Conference via Medical Zoom    As the provider I attest to compliance with applicable laws and regulations related to telemedicine.       [facility] Adult Mental Health Day Treatment  TRACK: 1B    NUMBER OF PARTICIPANTS: 6    Summary of Group / Topics Discussed:  Resiliency Development: Self Compassion Journal: Patients were given the opportunity to reflect and identified the positive aspects of their life.  Patients were taught about the importance of self kindness on mental health wellbeing.  Patients were also given the opportunity to improve self efficacy, self sufficiency, quality of life and a sense of mastery and competency by identifying positive aspects of their life and to instill hope.      Patient Session Goals / Objectives:    Identified personal strengths and qualities about themselves as a way to provide hope and build self-compassion as a way to effectively manage both mental health and substance abuse/abuse symptoms      Improved awareness of positive qualities and how this contribute to the process of recovery and mental health wellbeing and resiliency      Established a plan for practice of these skills in their own environments    Practiced and reflected on how to generalize taught skills to their everyday life        Patient Participation / Response:  Fully participated with the group by sharing personal reflections / insights and openly received / provided feedback with other participants.    Demonstrated understanding of content through handout/video/discussion , Verbalized understanding of content and Patient would benefit from additional opportunities to practice the content to be able to generalize it to their everyday life with increased intentionality, consistency, and efficacy in support of their psychiatric recovery    Treatment Plan:  Patient has a current master individualized treatment plan.  See Epic treatment plan for more information.    Nehemiah Freeman, OTR/L

## 2021-10-18 NOTE — GROUP NOTE
"Process Group Note    PATIENT'S NAME: Nimisha Jasso  MRN:   [id number]  :   1995  ACCT. NUMBER: [id number]  DATE OF SERVICE: 10/18/21  START TIME:  1:00 PM  END TIME:  1:50 PM  FACILITATOR: Tyra Ricci [facility]  TOPIC:  Process Group    Diagnoses:  296.32 (F33.1) Major Depressive Disorder, Recurrent Episode, Moderate  300.02 (F41.1) Generalized Anxiety Disorder      [facility] Mental Health Day Treatment  TRACK: 1B    NUMBER OF PARTICIPANTS: 4                                      Service Modality:  Video Visit     Telemedicine Visit: The patient's condition can be safely assessed and treated via synchronous audio and visual telemedicine encounter.      Reason for Telemedicine Visit: Services only offered telehealth    Originating Site (Patient Location): Patient's home    Distant Site (Provider Location): Provider Remote Setting- Home Office    Consent:  The patient/guardian has verbally consented to: the potential risks and benefits of telemedicine (video visit) versus in person care; bill my insurance or make self-payment for services provided; and responsibility for payment of non-covered services.     Patient would like the video invitation sent by:  My Chart    Mode of Communication:  Video Conference via Medical Zoom    As the provider I attest to compliance with applicable laws and regulations related to telemedicine.                Data:    Session content: At the start of this group, patients were invited to check in by identifying themselves, describing their current emotional status, and identifying issues to address in this group.   Area(s) of treatment focus addressed in this session included Symptom Management and Personal Safety.    Keith reported feeling \"foggy\" today.  Their goal for the day is to \"keep my head above water\" and go shopping with their roommate later. Patient declined additional process time but contributed to group discussion and provided feedback and " support to peers.      Therapeutic Interventions/Treatment Strategies:  Psychotherapist offered support, feedback and validation.    Assessment:    Patient response:   Patient responded to session by accepting feedback, giving feedback and listening    Possible barriers to participation / learning include: and no barriers identified    Health Issues:   None reported       Substance Use Review:   Substance Use: alcohol .     Mental Status/Behavioral Observations  Appearance:   Appropriate   Eye Contact:   Good   Psychomotor Behavior: Normal   Attitude:   Cooperative   Orientation:   All  Speech   Rate / Production: Normal    Volume:  Normal   Mood:    Anxious  Depressed   Affect:    Appropriate   Thought Content:   Clear  Thought Form:  Coherent  Logical     Insight:    Good     Plan:     Safety Plan: No current safety concerns identified.  Recommended that patient call 911 or go to the local ED should there be a change in any of these risk factors.     Barriers to treatment: None identified    Patient Contracts (see media tab):  None    Substance Use: Not addressed in session     Continue or Discharge: Patient will continue in Adult Day Treatment (ADT)  as planned. Patient is likely to benefit from learning and using skills as they work toward the goals identified in their treatment plan.      Tyra Ricci, [facility]  October 18, 2021

## 2021-10-18 NOTE — GROUP NOTE
Psychotherapy Group Note    PATIENT'S NAME: Nimisha Jasso  MRN:   [id number]  :   1995  ACCT. NUMBER: [id number]  DATE OF SERVICE: 10/18/21  START TIME:  3:00 PM  END TIME:  3:50 PM  FACILITATOR: Tyra Ricci LPCC  TOPIC: MH EBP Group: Coping Skills  [facility] Adult Mental Health Day Treatment  TRACK: 1B    NUMBER OF PARTICIPANTS: 6                                      Service Modality:  Video Visit     Telemedicine Visit: The patient's condition can be safely assessed and treated via synchronous audio and visual telemedicine encounter.      Reason for Telemedicine Visit: Services only offered telehealth    Originating Site (Patient Location): Patient's home    Distant Site (Provider Location): Provider Remote Setting- Home Office    Consent:  The patient/guardian has verbally consented to: the potential risks and benefits of telemedicine (video visit) versus in person care; bill my insurance or make self-payment for services provided; and responsibility for payment of non-covered services.     Patient would like the video invitation sent by:  My Chart    Mode of Communication:  Video Conference via Medical Zoom    As the provider I attest to compliance with applicable laws and regulations related to telemedicine.          Summary of Group / Topics Discussed:  Coping Skills: Radical Acceptance: Patients learned radical acceptance as a way to tolerate heightened stress in the moment.  Patients identified situations that necessitate radical acceptance.  They focused on applying acceptance of the moment to tolerate difficult emotions and events. Patients discussed how to distinguish when this can be useful in their lives and when other skills are more relevant or helpful.    Patient Session Goals / Objectives:    Understand that some amount of pain exists for each of us in our lives    Process the difficulty of acceptance in our lives and benefits of radical acceptance to mood and  functioning.    Demonstrate understanding of when to use the radical acceptance to tolerate distress by providing examples of situations where this could be applied.    Identify barriers to applying radical acceptance to difficult situations and explore strategies to overcome them        Patient Participation / Response:  Fully participated with the group by sharing personal reflections / insights and openly received / provided feedback with other participants.    Demonstrated understanding of topics discussed through group discussion and participation, Expressed understanding of the relevance / importance of coping skills at distressing times in life and Demonstrated knowledge of when to consider using a variety of coping skills in daily life    Treatment Plan:  Patient has a current master individualized treatment plan.  See Epic treatment plan for more information.    Tyra Ricci, [facility]

## 2021-10-19 NOTE — PROGRESS NOTES
Patient Active Problem List   Diagnosis     Acne vulgaris     Generalized anxiety disorder     Insomnia     Moderate episode of recurrent major depressive disorder (H)     Major depressive disorder, recurrent episode, moderate (H)       Current Outpatient Medications:      buPROPion (WELLBUTRIN SR) 150 MG 12 hr tablet, [BUPROPION (WELLBUTRIN SR) 150 MG 12 HR TABLET] Take 1 tablet (150 mg total) by mouth 2 (two) times a day. (Patient not taking: Reported on 8/27/2021), Disp: 180 tablet, Rfl: 3     calcium-vitamin D 500 mg(1,250mg) -200 unit per tablet, [CALCIUM-VITAMIN D 500 MG(1,250MG) -200 UNIT PER TABLET] Take 1 tablet by mouth 2 (two) times a day with meals., Disp: , Rfl:      escitalopram (LEXAPRO) 10 MG tablet, Take 10 mg by mouth daily, Disp: , Rfl:      PARoxetine (PAXIL) 20 MG tablet, Take 1 tablet (20 mg) by mouth every morning (Patient not taking: Reported on 8/27/2021), Disp: 30 tablet, Rfl: 0     PARoxetine (PAXIL) 40 MG tablet, Take 1 tablet (40 mg) by mouth every morning (Patient not taking: Reported on 8/27/2021), Disp: 90 tablet, Rfl: 1     traZODone (DESYREL) 50 MG tablet, [TRAZODONE (DESYREL) 50 MG TABLET] Take 1 tablet (50 mg total) by mouth at bedtime., Disp: 90 tablet, Rfl: 3  Psychiatry staffing: case discussed  Diagnosis:  As above;  The medication list includes 2 or more serotonin affecting agents.  They should be aware of the symptoms of Serotonin Syndrome should it develop  Working on depression symptoms

## 2021-10-19 NOTE — ADDENDUM NOTE
Encounter addended by: Russel Norris MD on: 10/19/2021 8:12 AM   Actions taken: Clinical Note Signed

## 2021-10-20 ENCOUNTER — HOSPITAL ENCOUNTER (OUTPATIENT)
Dept: BEHAVIORAL HEALTH | Facility: CLINIC | Age: 26
End: 2021-10-20
Attending: PSYCHIATRY & NEUROLOGY
Payer: COMMERCIAL

## 2021-10-20 PROCEDURE — 90853 GROUP PSYCHOTHERAPY: CPT | Mod: GT | Performed by: COUNSELOR

## 2021-10-20 PROCEDURE — G0177 OPPS/PHP; TRAIN & EDUC SERV: HCPCS | Mod: GT

## 2021-10-20 NOTE — GROUP NOTE
Keith expressed SI thoughts worries during the second group of the day. Agreed to check in with Keith after group. They agreed to: have their roommate take them to [facility] tomorrow AM or will consider another ED/ER if needed. If their SI feels active, they will call 911, emergency contact or call an Uber to go to an ED.      Psychoeducation Group Note    PATIENT'S NAME: Nimisha Jasso  MRN:   [id number]  :   1995  ACCT. NUMBER: [id number]  DATE OF SERVICE: 10/20/21  START TIME:  3:00 PM  END TIME:  3:50 PM  FACILITATOR: Daniel Sanchez RN  TOPIC:  RN Group: [facility]                                    Service Modality:  Video Visit     Telemedicine Visit: The patient's condition can be safely assessed and treated via synchronous audio and visual telemedicine encounter.      Reason for Telemedicine Visit: Covid19    Originating Site (Patient Location): Patient's home    Distant Site (Provider Location): Provider Remote Setting- Home Office    Consent:  The patient/guardian has verbally consented to: the potential risks and benefits of telemedicine (video visit) versus in person care; bill my insurance or make self-payment for services provided; and responsibility for payment of non-covered services.     Patient would like the video invitation sent by:  My Chart    Mode of Communication:  Video Conference via Medical Zoom    As the provider I attest to compliance with applicable laws and regulations related to telemedicine.      [facility] Adult Mental Health Day Treatment  TRACK: 1b    NUMBER OF PARTICIPANTS: 3    Summary of Group / Topics Discussed:  Foundations of Health: Nutrition: MICD nutrition: Patients were provided education on the role of nutrition in the body and all of the functions that nutrition influences including energy, body structure and bodily function as overarching categories. Select macronutrients and micronutrients and their important roles and functions were  also reviewed. Chemical and substance use disrupt how we eat, how our organs behave, and what our body does with the food that we do eat. Patients were educated on the effects that chemicals have on nutritional status and ways in which nutrition can be promoted while in recovery.      Patient Session Goals / Objectives:  ? Identified hunger as a factor that can increase risk for chemical/substance relapse  ? Described the role of macronutrients and micronutrients in the body  ? Explained the effects of chemicals/substances on nutrition  ? Listed strategies for promoting balanced nutrition to promote wellness and recovery      Patient Participation / Response:  Fully participated with the group by sharing personal reflections / insights and openly received / provided feedback with other participants.    Demonstrated understanding of topics discussed through group discussion and participation    Treatment Plan:  Patient has a current master individualized treatment plan.  See Epic treatment plan for more information.    Daniel Sanchez RN

## 2021-10-20 NOTE — GROUP NOTE
"Process Group Note    PATIENT'S NAME: Nimisha Jasso  MRN:   [id number]  :   1995  ACCT. NUMBER: [id number]  DATE OF SERVICE: 10/20/21  START TIME:  2:00 PM  END TIME:  2:50 PM  FACILITATOR: Tyra Ricci [facility]  TOPIC:  Process Group    Diagnoses:  296.32 (F33.1) Major Depressive Disorder, Recurrent Episode, Moderate  300.02 (F41.1) Generalized Anxiety Disorder      [facility] Day Treatment  TRACK: 1B    NUMBER OF PARTICIPANTS: 4                                      Service Modality:  Video Visit     Telemedicine Visit: The patient's condition can be safely assessed and treated via synchronous audio and visual telemedicine encounter.      Reason for Telemedicine Visit: Services only offered telehealth    Originating Site (Patient Location): Patient's home    Distant Site (Provider Location): Provider Remote Setting- Home Office    Consent:  The patient/guardian has verbally consented to: the potential risks and benefits of telemedicine (video visit) versus in person care; bill my insurance or make self-payment for services provided; and responsibility for payment of non-covered services.     Patient would like the video invitation sent by:  My Chart    Mode of Communication:  Video Conference via Medical Zoom    As the provider I attest to compliance with applicable laws and regulations related to telemedicine.                Data:    Session content: At the start of this group, patients were invited to check in by identifying themselves, describing their current emotional status, and identifying issues to address in this group.   Area(s) of treatment focus addressed in this session included Symptom Management and Personal Safety.    Keith reported feeling \"foggy\" and \"stagnant\" today.  Her goal is to call their psychiatrist.  They reported having a self-harmed and a lot of passive SI.  They committed to follow their safety plan and their roommate is aware.     Therapeutic " Interventions/Treatment Strategies:  Psychotherapist offered support, feedback and validation and reinforced use of skills.    Assessment:    Patient response:   Patient responded to session by accepting feedback, giving feedback and listening    Possible barriers to participation / learning include: and no barriers identified    Health Issues:   None reported       Substance Use Review:   Substance Use: No active concerns identified.    Mental Status/Behavioral Observations  Appearance:   Appropriate   Eye Contact:   Good   Psychomotor Behavior: Normal   Attitude:   Cooperative   Orientation:   All  Speech   Rate / Production: Normal    Volume:  Normal   Mood:    Anxious  Depressed   Affect:    Appropriate   Thought Content:   Safety reports  presence of suicidal ideation passive suicidal ideation  and thoughts of self-harm  Thought Form:  Coherent  Logical     Insight:    Good     Plan:     Safety Plan: Committed to safety and agreed to follow previously developed safety coping plan.      Barriers to treatment: None identified    Patient Contracts (see media tab):  None    Substance Use: Not addressed in session     Continue or Discharge: Patient will continue in Adult Day Treatment (ADT)  as planned. Patient is likely to benefit from learning and using skills as they work toward the goals identified in their treatment plan.      Tyra Ricci, [facility]  October 20, 2021

## 2021-10-20 NOTE — GROUP NOTE
Psychoeducation Group Note    PATIENT'S NAME: Nimisha Jasso  MRN:   [id number]  :   1995  ACCT. NUMBER: [id number]  DATE OF SERVICE: 10/20/21  START TIME:  1:00 PM  END TIME:  1:50 PM  FACILITATOR: Nehemiah Freeman OTR/L  TOPIC:  Life Skills Group: Communication and Social Skills Development                                    Service Modality:  Video Visit     Telemedicine Visit: The patient's condition can be safely assessed and treated via synchronous audio and visual telemedicine encounter.      Reason for Telemedicine Visit: Services only offered telehealth    Originating Site (Patient Location): Patient's home    Distant Site (Provider Location): Provider Remote Setting- Home Office    Consent:  The patient/guardian has verbally consented to: the potential risks and benefits of telemedicine (video visit) versus in person care; bill my insurance or make self-payment for services provided; and responsibility for payment of non-covered services.     Mode of Communication:  Video Conference via Medical Zoom    As the provider I attest to compliance with applicable laws and regulations related to telemedicine.       [facility] Mental Health Day Treatment  TRACK: 1B    NUMBER OF PARTICIPANTS: 6    Summary of Group / Topics Discussed:  Communication and Social Skills Development: Communication Styles: Conflict Life Reynolds (Personal Conflicts): Patients discussed and were taught about different reynolds of conflict in life and how it impacts or is impacted by mental health symptoms.  Patients gained awareness of personal conflict reynolds.  Patients were taught how to identify and take active steps to resolve these conflicts.      Patient Session Goals / Objectives:    Identified personal conflict reynolds and how these impact or are impacted by mental health symptoms        Improved awareness of important life reynolds were conflict is being experienced        Established a plan for practice of  these skills in their own environments    Practiced and reflected on how to generalize taught skills to their everyday life        Patient Participation / Response:  Fully participated with the group by sharing personal reflections / insights and openly received / provided feedback with other participants.    Demonstrated understanding of content through handout/discussion , Verbalized understanding of content and Patient would benefit from additional opportunities to practice the content to be able to generalize it to their everyday life with increased intentionality, consistency, and efficacy in support of their psychiatric recovery    Treatment Plan:  Patient has a current master individualized treatment plan.  See Epic treatment plan for more information.    Nehemiah Freeman, OTR/L

## 2021-10-21 ENCOUNTER — HOSPITAL ENCOUNTER (OUTPATIENT)
Dept: BEHAVIORAL HEALTH | Facility: CLINIC | Age: 26
End: 2021-10-21
Attending: PSYCHIATRY & NEUROLOGY
Payer: COMMERCIAL

## 2021-10-21 PROCEDURE — 90853 GROUP PSYCHOTHERAPY: CPT | Mod: GT | Performed by: COUNSELOR

## 2021-10-21 PROCEDURE — G0177 OPPS/PHP; TRAIN & EDUC SERV: HCPCS | Mod: GT

## 2021-10-21 NOTE — GROUP NOTE
Psychotherapy Group Note    PATIENT'S NAME: Nimisha Jasso  MRN:   [id number]  :   1995  ACCT. NUMBER: [id number]  DATE OF SERVICE: 10/21/21  START TIME:  3:00 PM  END TIME:  3:50 PM  FACILITATOR: Tyra Ricci LPCC  TOPIC: MH EBP Group: Specialty Awareness  [facility] Adult Mental Health Day Treatment  TRACK: 1B    NUMBER OF PARTICIPANTS: 4                                      Service Modality:  Video Visit     Telemedicine Visit: The patient's condition can be safely assessed and treated via synchronous audio and visual telemedicine encounter.      Reason for Telemedicine Visit: Services only offered telehealth    Originating Site (Patient Location): Patient's home    Distant Site (Provider Location): Provider Remote Setting- Home Office    Consent:  The patient/guardian has verbally consented to: the potential risks and benefits of telemedicine (video visit) versus in person care; bill my insurance or make self-payment for services provided; and responsibility for payment of non-covered services.     Patient would like the video invitation sent by:  My Chart    Mode of Communication:  Video Conference via Medical Zoom    As the provider I attest to compliance with applicable laws and regulations related to telemedicine.        Summary of Group / Topics Discussed:  Specialty Topics: Trauma and PTSD: Patients were provided with information to understand the types and origins of trauma, the relationship between trauma and PTSD, the scope of Trauma-Informed Care, and treatment options. Patients were able to explore how trauma may have impacted their functioning directly or indirectly, with reference to the the complexity of trauma and associated treatment options. Patients reviewed their current awareness of this topic and relevance to their functioning. Individual experiences with symptoms and treatment options were discussed.     Patient Session Goals / Objectives:    Discussed definitions of  trauma, Trauma-Informed Care, PTSD    Discussed how traumatic experiences affect the individual and their relationships (directly, indirectly, brain development and function)    Identified how a history of trauma or exposure to trauma may impact group work    Assisted patients to find ways to adapt functioning in light of past traumatic experience(s), and to identify suitable treatment options and community resources      Patient Participation / Response:  Fully participated with the group by sharing personal reflections / insights and openly received / provided feedback with other participants.    Demonstrated understanding of topics discussed through group discussion and participation, Identified / Expressed readiness to act on skill suggestions discussed in topic and Verbalized understanding of ways to proactively manage illness    Treatment Plan:  Patient has a current master individualized treatment plan.  See Epic treatment plan for more information.    Tyra Ricci, [facility]

## 2021-10-21 NOTE — GROUP NOTE
"Process Group Note    PATIENT'S NAME: Nimisha Jasso  MRN:   [id number]  :   1995  ACCT. NUMBER: [id number]  DATE OF SERVICE: 10/21/21  START TIME:  1:00 PM  END TIME:  1:50 PM  FACILITATOR: Tyra Ricci [facility]  TOPIC:  Process Group    Diagnoses:  296.32 (F33.1) Major Depressive Disorder, Recurrent Episode, Moderate  300.02 (F41.1) Generalized Anxiety Disorder      [facility] Day Treatment  TRACK: 1B    NUMBER OF PARTICIPANTS: 4                                      Service Modality:  Video Visit     Telemedicine Visit: The patient's condition can be safely assessed and treated via synchronous audio and visual telemedicine encounter.      Reason for Telemedicine Visit: Services only offered telehealth    Originating Site (Patient Location): Patient's home    Distant Site (Provider Location): Provider Remote Setting- Home Office    Consent:  The patient/guardian has verbally consented to: the potential risks and benefits of telemedicine (video visit) versus in person care; bill my insurance or make self-payment for services provided; and responsibility for payment of non-covered services.     Patient would like the video invitation sent by:  My Chart    Mode of Communication:  Video Conference via Medical Zoom    As the provider I attest to compliance with applicable laws and regulations related to telemedicine.                Data:    Session content: At the start of this group, patients were invited to check in by identifying themselves, describing their current emotional status, and identifying issues to address in this group.   Area(s) of treatment focus addressed in this session included Symptom Management and Personal Safety.    Keith reported feeling \"foggy\" today.  Their goal is to email their psychiatrist.  They reported still having passive suicidal ideation but not as bad as yesterday.  Patient declined additional process time but contributed to group discussion " and provided feedback and support to peers.      Therapeutic Interventions/Treatment Strategies:  Psychotherapist offered support, feedback and validation and reinforced use of skills.    Assessment:    Patient response:   Patient responded to session by accepting feedback, giving feedback and listening    Possible barriers to participation / learning include: and no barriers identified    Health Issues:   None reported       Substance Use Review:   Substance Use: No active concerns identified.    Mental Status/Behavioral Observations  Appearance:   Appropriate   Eye Contact:   Good   Psychomotor Behavior: Normal   Attitude:   Cooperative   Orientation:   All  Speech   Rate / Production: Normal    Volume:  Normal   Mood:    Anxious  Depressed   Affect:    Appropriate   Thought Content:   Safety reports  presence of suicidal ideation passive suicidal ideation   Thought Form:  Coherent  Logical     Insight:    Good     Plan:     Safety Plan: Committed to safety and agreed to follow previously developed safety coping plan.      Barriers to treatment: None identified    Patient Contracts (see media tab):  None    Substance Use: Not addressed in session     Continue or Discharge: Patient will continue in Adult Day Treatment (ADT)  as planned. Patient is likely to benefit from learning and using skills as they work toward the goals identified in their treatment plan.      Tyra Ricci, [facility]  October 21, 2021

## 2021-10-25 ENCOUNTER — HOSPITAL ENCOUNTER (OUTPATIENT)
Dept: BEHAVIORAL HEALTH | Facility: CLINIC | Age: 26
End: 2021-10-25
Attending: PSYCHIATRY & NEUROLOGY
Payer: COMMERCIAL

## 2021-10-25 PROCEDURE — G0177 OPPS/PHP; TRAIN & EDUC SERV: HCPCS | Mod: 95

## 2021-10-25 PROCEDURE — 90853 GROUP PSYCHOTHERAPY: CPT | Mod: 95

## 2021-10-25 NOTE — PROGRESS NOTES
Met individually with pt to discuss safety. They received safety plan sent via epic this afternoon and agrees to follow. Pt makes plan to take a nap this afternoon until roommates get home. Will return to group on Wed.

## 2021-10-25 NOTE — GROUP NOTE
Psychoeducation Group Note    PATIENT'S NAME: Nimisha Jasso  MRN:   [id number]  :   1995  ACCT. NUMBER: [id number]  DATE OF SERVICE: 10/25/21  START TIME:  2:00 PM  END TIME:  2:50 PM  FACILITATOR: Nehemiah Freeman OTR/L  TOPIC: MH Life Skills Group: Communication and Social Skills Development                                    Service Modality:  Video Visit     Telemedicine Visit: The patient's condition can be safely assessed and treated via synchronous audio and visual telemedicine encounter.      Reason for Telemedicine Visit: Services only offered telehealth    Originating Site (Patient Location): Patient's home    Distant Site (Provider Location): Provider Remote Setting- Home Office    Consent:  The patient/guardian has verbally consented to: the potential risks and benefits of telemedicine (video visit) versus in person care; bill my insurance or make self-payment for services provided; and responsibility for payment of non-covered services.     Mode of Communication:  Video Conference via Medical Zoom    As the provider I attest to compliance with applicable laws and regulations related to telemedicine.       [facility] Adult Mental Health Day Treatment  TRACK: 1B    NUMBER OF PARTICIPANTS: 4    Summary of Group / Topics Discussed:  Communication and Social Skills Development: Mental health Social Supports: Patients completed a social support self assessment to identify people who are supportive and to evaluate the effectiveness of their social support system.  Patients were taught and gained awareness of characteristics of an effective support and how to develop this in their lives.  Patients identified both personal strengths and opportunities for growth in this areas to improve overall communication and connection with other people.    Patient Session Goals / Objectives:    Identified strengths and opportunities for growth in developing their social support system and how this  impacts their ability to connect and communicate with other people       Improved awareness of important aspects of social support systems and how this relates to mental health recovery        Established a plan for practice of these skills in their own environments    Practiced and reflected on how to generalize taught skills to their everyday life          Patient Participation / Response:  Moderately participated, sharing some personal reflections / insights and adequately adequately received / provided feedback with other participants.    Demonstrated understanding of content through handouts/video/group discussion , Verbalized understanding of content and Patient would benefit from additional opportunities to practice the content to be able to generalize it to their everyday life with increased intentionality, consistency, and efficacy in support of their psychiatric recovery    Treatment Plan:  Patient has a current master individualized treatment plan.  See Epic treatment plan for more information.    Nehemiah Freeman, OTR/L

## 2021-10-25 NOTE — GROUP NOTE
"Process Group Note    PATIENT'S NAME: Nimisha Jasso  MRN:   [id number]  :   1995  ACCT. NUMBER: [id number]  DATE OF SERVICE: 10/25/21  START TIME:  1:00 PM  END TIME:  1:50 PM  FACILITATOR: Izabel Holman LICSW  TOPIC:  Process Group    Diagnoses:  296.32 (F33.1) Major Depressive Disorder, Recurrent Episode, Moderate  300.02 (F41.1) Generalized Anxiety Disorder      [facility] Adult Mental Health Day Treatment  TRACK: 1B                              Service Modality:  Video Visit     Telemedicine Visit: The patient's condition can be safely assessed and treated via synchronous audio and visual telemedicine encounter.      Reason for Telemedicine Visit: Services only offered telehealth    Originating Site (Patient Location): Patient's home    Distant Site (Provider Location): [facility]    Consent:  The patient/guardian has verbally consented to: the potential risks and benefits of telemedicine (video visit) versus in person care; bill my insurance or make self-payment for services provided; and responsibility for payment of non-covered services.     Patient would like the video invitation sent by:  My Chart    Mode of Communication:  Video Conference via Medical Zoom    As the provider I attest to compliance with applicable laws and regulations related to telemedicine.         NUMBER OF PARTICIPANTS: 5          Data:    Session content: At the start of this group, patients were invited to check in by identifying themselves, describing their current emotional status, and identifying issues to address in this group.   Area(s) of treatment focus addressed in this session included Symptom Management and Personal Safety.  Pt reports \"a rough couple of days\". They reports poor eating and a desire not to return to body image issues from years ago. Pt states \"I am not too concerned about suicidal ideation\". Pt identifies a plan to stay safe until roommates " come home this evening.  Discussed safety plan and pt agrees to go to empath unit if suicidal ideation increases. Sent a copy of safety plan via [employer].    Therapeutic Interventions/Treatment Strategies:  Psychotherapist offered support, feedback and validation and reinforced use of skills. Treatment modalities used include Cognitive Behavioral Therapy. Interventions include Coping Skills: Discussed use of self-soothe skills to decrease distress in the body.    Assessment:    Patient response:   Patient responded to session by accepting feedback, giving feedback, listening, focusing on goals, being attentive and accepting support    Possible barriers to participation / learning include: and no barriers identified    Health Issues:   None reported       Substance Use Review:   Substance Use: No active concerns identified.    Mental Status/Behavioral Observations  Appearance:   Appropriate   Eye Contact:   Good   Psychomotor Behavior: Normal   Attitude:   Cooperative   Orientation:   All  Speech   Rate / Production: Normal    Volume:  Normal   Mood:    Anxious  Depressed   Affect:    Tearful  Thought Content:   Rumination  Thought Form:  Coherent     Insight:    Good     Plan:     Safety Plan: No current safety concerns identified.  Recommended that patient call 911 or go to the local ED should there be a change in any of these risk factors.     Barriers to treatment: None identified    Patient Contracts (see media tab):  None    Substance Use: Not addressed in session     Continue or Discharge: Patient will continue in Adult Day Treatment (ADT)  as planned. Patient is likely to benefit from learning and using skills as they work toward the goals identified in their treatment plan.      Izabel Galicia, ANAIS  October 25, 2021

## 2021-10-26 ENCOUNTER — HOSPITAL ENCOUNTER (OUTPATIENT)
Facility: CLINIC | Age: 26
Setting detail: OBSERVATION
Discharge: HOME | End: 2021-10-27
Attending: EMERGENCY MEDICINE | Admitting: EMERGENCY MEDICINE
Payer: COMMERCIAL

## 2021-10-26 DIAGNOSIS — R45.851 SUICIDAL IDEATION: ICD-10-CM

## 2021-10-26 DIAGNOSIS — F33.2 SEVERE EPISODE OF RECURRENT MAJOR DEPRESSIVE DISORDER, WITHOUT PSYCHOTIC FEATURES (H): ICD-10-CM

## 2021-10-26 LAB — SARS-COV-2 RNA RESP QL NAA+PROBE: NEGATIVE

## 2021-10-26 PROCEDURE — C9803 HOPD COVID-19 SPEC COLLECT: HCPCS

## 2021-10-26 PROCEDURE — 87635 SARS-COV-2 COVID-19 AMP PRB: CPT | Performed by: EMERGENCY MEDICINE

## 2021-10-26 PROCEDURE — G0378 HOSPITAL OBSERVATION PER HR: HCPCS

## 2021-10-26 PROCEDURE — 99285 EMERGENCY DEPT VISIT HI MDM: CPT | Mod: 25

## 2021-10-26 PROCEDURE — 99220 PR INITIAL OBSERVATION CARE,LEVEL III: CPT | Performed by: NURSE PRACTITIONER

## 2021-10-26 PROCEDURE — 250N000013 HC RX MED GY IP 250 OP 250 PS 637: Performed by: NURSE PRACTITIONER

## 2021-10-26 RX ORDER — ESCITALOPRAM OXALATE 5 MG/1
5 TABLET ORAL DAILY
COMMUNITY
End: 2022-08-05

## 2021-10-26 RX ORDER — HYDROXYZINE HYDROCHLORIDE 25 MG/1
25-50 TABLET, FILM COATED ORAL EVERY 6 HOURS PRN
Status: DISCONTINUED | OUTPATIENT
Start: 2021-10-26 | End: 2021-10-28 | Stop reason: HOSPADM

## 2021-10-26 RX ORDER — BUSPIRONE HYDROCHLORIDE 15 MG/1
15 TABLET ORAL 2 TIMES DAILY
COMMUNITY

## 2021-10-26 RX ORDER — BUSPIRONE HYDROCHLORIDE 10 MG/1
15 TABLET ORAL 2 TIMES DAILY
COMMUNITY
End: 2021-10-26

## 2021-10-26 RX ORDER — SERTRALINE HYDROCHLORIDE 25 MG/1
25 TABLET, FILM COATED ORAL DAILY
COMMUNITY
End: 2022-08-05

## 2021-10-26 RX ORDER — TRAZODONE HYDROCHLORIDE 50 MG/1
50 TABLET, FILM COATED ORAL AT BEDTIME
Status: DISCONTINUED | OUTPATIENT
Start: 2021-10-26 | End: 2021-10-28 | Stop reason: HOSPADM

## 2021-10-26 RX ORDER — SERTRALINE HYDROCHLORIDE 100 MG/1
100 TABLET, FILM COATED ORAL DAILY
COMMUNITY
End: 2021-10-26

## 2021-10-26 RX ORDER — ESCITALOPRAM OXALATE 5 MG/5ML
10 SOLUTION ORAL DAILY
Status: DISCONTINUED | OUTPATIENT
Start: 2021-10-27 | End: 2021-10-27

## 2021-10-26 RX ADMIN — BUSPIRONE HYDROCHLORIDE 15 MG: 10 TABLET ORAL at 22:11

## 2021-10-26 RX ADMIN — HYDROXYZINE HYDROCHLORIDE 25 MG: 25 TABLET ORAL at 22:11

## 2021-10-26 ASSESSMENT — ENCOUNTER SYMPTOMS
ABDOMINAL PAIN: 0
NAUSEA: 0
FEVER: 0
BLOOD IN STOOL: 1
SHORTNESS OF BREATH: 0
DYSURIA: 0
VOMITING: 0
DIARRHEA: 1

## 2021-10-26 ASSESSMENT — MIFFLIN-ST. JEOR
SCORE: 1426.28
SCORE: 1487.06

## 2021-10-26 ASSESSMENT — ACTIVITIES OF DAILY LIVING (ADL)
DRESS: INDEPENDENT
ORAL_HYGIENE: INDEPENDENT
HYGIENE/GROOMING: INDEPENDENT

## 2021-10-26 NOTE — ED PROVIDER NOTES
History     Chief Complaint:  Psychiatric Evaluation     HPI   Nimisha Jasso is a 26 year old adult who presents with suicidal ideation. The patient states they have had worsening suicidal ideation over the past few months. They currently have a therapist and psychiatrist as well as an out patient therapy group through [place], however they state this has not been helping. They endorse a plan by overdosing with their sleeping pills, however they have given the medication to their friend. They have no access to guns in their home. They deny any history of admission for suicidality, however they have been brought to urgent care due to suicidal ideation. They have been transitioning from Lexapro to Zoloft for the past 4 weeks. They denies homicidal ideation, auditory or visual hallucinations. They denies drug or alcohol use. Denies fevers, chest pain, shortness of breath, abdominal pain, nausea or vomiting. Endorses diarrhea and blood in stool, however they note a history of hemorrhoids and believe this could be secondary to this. Denies dysuria or urinary symptoms. Denies hormone use. Denies abnormal vaginal bleeding or discharge.     ROS:  Review of Systems   Constitutional: Negative for fever.   Respiratory: Negative for shortness of breath.    Cardiovascular: Negative for chest pain.   Gastrointestinal: Positive for blood in stool and diarrhea. Negative for abdominal pain, nausea and vomiting.   Genitourinary: Negative.  Negative for dysuria.   Psychiatric/Behavioral: Positive for suicidal ideas.   All other systems reviewed and are negative.     Allergies:  No Known Allergies     Medications:    Buspar  Lexapro  Zoloft  Desyrel  Wellbutrin  Paxil    Past Medical History:    Depression  Generalized anxiety disorder     Past Surgical History:    Dental surgery      Family History:    family history includes Cancer in Keith Jasso's mother; Depression in Keith Jasso's maternal grandfather; Diabetes  "in Keith Jasso's mother; No Known Problems in Keith Jasso's brother and father; Thyroid Disease in Keith Jasso's mother.    Social History:   reports that Keith Jasso has never smoked. Keith Jasso has never used smokeless tobacco. Keith Jasso reports current alcohol use. Keith Jasso reports that Keith Jasso does not use drugs.  PCP: Martin Sanchez     Physical Exam     Patient Vitals for the past 24 hrs:   BP Temp Temp src Pulse Resp SpO2 Height Weight   10/26/21 1839 115/79 98.1  F (36.7  C) Oral 79 16 99 % 1.651 m (5' 5\") 68 kg (150 lb)        Physical Exam  General: Alert, appears well-developed and well-nourished. Cooperative.     In mild distress  HEENT:  Head:  Atraumatic  Ears:  External ears are normal  Mouth/Throat:  Oropharynx is without erythema or exudate and mucous membranes are moist.   Eyes:   Conjunctivae normal and EOM are normal. No scleral icterus.  CV:  Normal rate, regular rhythm, normal heart sounds and radial pulses are 2+ and symmetric.  No murmur.  Resp:  Breath sounds are clear bilaterally    Non-labored, no retractions or accessory muscle use  GI:  Abdomen is soft, no distension, no tenderness. No rebound or guarding.  No CVA tenderness bilaterally  MS:  Normal range of motion. No edema.    Normal strength in all 4 extremities.     Back atraumatic.    No midline cervical, thoracic, or lumbar tenderness  Skin:  Warm and dry.  No rash or lesions noted.  Neuro: Alert. Normal strength.  GCS: 15  Psych:  Depressed mood and flat affect.  Endorses suicidal ideation.  Denies HI and hallucinations.     Emergency Department Course   Laboratory:  Labs Ordered and Resulted from Time of ED Arrival Up to the Time of Departure from the ED   COVID-19 VIRUS (CORONAVIRUS) BY PCR - Normal    Narrative:     Testing was performed using the [employer]um  SARS-CoV-2 & Influenza A/B Assay on the [employer]um  [employer]  System.  This test should be ordered for the detection of SARS-COV-2 " in individuals who meet SARS-CoV-2 clinical and/or epidemiological criteria. Test performance is unknown in asymptomatic patients.  This test is for in vitro diagnostic use under the FDA EUA for laboratories certified under CLIA to perform moderate and/or high complexity testing. This test has not been FDA cleared or approved.  A negative test does not rule out the presence of PCR inhibitors in the specimen or target RNA in concentration below the limit of detection for the assay. The possibility of a false negative should be considered if the patient's recent exposure or clinical presentation suggests COVID-19.  [facility] are certified under the Clinical Laboratory Improvement Amendments of 1988 (CLIA-88) as qualified to perform moderate and/or high complexity laboratory testing.   VITAL SIGNS   PATIENT CARE ORDER   NOTIFY PROVIDER        Emergency Department Course:  Reviewed:  I reviewed nursing notes, vitals and past medical history    Assessments:  1828 I obtained history and examined the patient as noted above.    I rechecked the patient and explained findings.     Disposition:  The patient was transferred to [facility].     Impression & Plan    Covid-19  Nimisha Jasso was evaluated during a global COVID-19 pandemic, which necessitated consideration that the patient might be at risk for infection with the SARS-CoV-2 virus that causes COVID-19.   Applicable protocols for evaluation were followed during the patient's care.   COVID-19 was considered as part of the patient's evaluation. The plan for testing is:  a test was obtained during this visit.    Medical Decision Making:  Patient is a 26-year-old adult who presents with suicidal ideation.  Patient has a plan to overdose of medications.  She presents voluntarily and feels safe while here in the emergency department.  They have never been admitted to the hospital for mental health reasons historically.  Denies any ingestions such as  alcohol or illicit substances today.  Denies hallucinations and homicidal ideation.  Patient is medically cleared at this time and has no fever, chest pain, shortness of breath, abdominal pain, nausea or vomiting.  They have no urinary complaints.  Covid swab negative.  Patient appropriate for empath transfer for definitive psychiatric evaluation and disposition planning.  Patient sent to empath.    Diagnosis:    ICD-10-CM    1. Suicidal ideation  R45.851    2. Depression, unspecified depression type  F32.A          Scribe Disclosure:  I, Maria Antonia Echeverria, am serving as a scribe at 6:36 PM on 10/26/2021 to document services personally performed by Andrew Ba MD based on my observations and the provider's statements to me.     10/26/2021   Andrew Ba MD White, Scott, MD  10/26/21 1919

## 2021-10-27 VITALS
BODY MASS INDEX: 27.22 KG/M2 | RESPIRATION RATE: 16 BRPM | SYSTOLIC BLOOD PRESSURE: 104 MMHG | OXYGEN SATURATION: 100 % | DIASTOLIC BLOOD PRESSURE: 70 MMHG | WEIGHT: 163.4 LBS | HEIGHT: 65 IN | HEART RATE: 73 BPM | TEMPERATURE: 98.5 F

## 2021-10-27 PROCEDURE — 250N000013 HC RX MED GY IP 250 OP 250 PS 637: Performed by: NURSE PRACTITIONER

## 2021-10-27 PROCEDURE — 250N000013 HC RX MED GY IP 250 OP 250 PS 637: Performed by: PSYCHIATRY & NEUROLOGY

## 2021-10-27 PROCEDURE — G0378 HOSPITAL OBSERVATION PER HR: HCPCS

## 2021-10-27 PROCEDURE — 99217 PR OBSERVATION CARE DISCHARGE: CPT | Performed by: PSYCHIATRY & NEUROLOGY

## 2021-10-27 RX ORDER — ESCITALOPRAM OXALATE 5 MG/1
5 TABLET ORAL DAILY
Status: DISCONTINUED | OUTPATIENT
Start: 2021-10-27 | End: 2021-10-28 | Stop reason: HOSPADM

## 2021-10-27 RX ADMIN — HYDROXYZINE HYDROCHLORIDE 50 MG: 25 TABLET ORAL at 14:24

## 2021-10-27 RX ADMIN — TRAZODONE HYDROCHLORIDE 50 MG: 50 TABLET ORAL at 02:08

## 2021-10-27 RX ADMIN — SERTRALINE HYDROCHLORIDE 75 MG: 50 TABLET ORAL at 09:43

## 2021-10-27 RX ADMIN — ESCITALOPRAM 5 MG: 5 TABLET, FILM COATED ORAL at 10:11

## 2021-10-27 RX ADMIN — BUSPIRONE HYDROCHLORIDE 15 MG: 10 TABLET ORAL at 09:44

## 2021-10-27 RX ADMIN — BUSPIRONE HYDROCHLORIDE 15 MG: 10 TABLET ORAL at 20:34

## 2021-10-27 ASSESSMENT — ACTIVITIES OF DAILY LIVING (ADL)
DRESS: SCRUBS (BEHAVIORAL HEALTH)
ORAL_HYGIENE: INDEPENDENT
HYGIENE/GROOMING: INDEPENDENT
DRESS: SCRUBS (BEHAVIORAL HEALTH)

## 2021-10-27 NOTE — ED NOTES
[facility] ED Note    Met briefly with pt who was distressed.  They reported they feel more suicidal after being here and more hopeless.  They were crying, head in their hands, shaking and very distressed.  Writer provided support.  Recommended they take the PRN nurse had offered in order to feel more calm and be able to make a decision about their plan of care.  Pt agreed.  She also pet the therapy dog that came on the unit.  Pt will meet with psychiatrist next.  Pt remains in the sensory room.    ALTON Guadarrama

## 2021-10-27 NOTE — PLAN OF CARE
"Pt tearful after having talked to their therapist on the phone. They report their therapist encouraged them to tell staff everything. They report they want to go home but are scared that if they do the will not function at home and just \"sit on the couch and think about killing myself.\" They report they have never been as suicidal as they are here in the hospital. They think they feel this way because they did not sleep well last night and are unable to sleep in their bed, or shower or have their toiletries. They state they feel paranoid that their roommate does not want to be around them even though the roommate has not said anything to them. They also expressed not being hungry but trying to force themselves to eat. LMHP in to talk to Pt at the moment.   "

## 2021-10-27 NOTE — ED NOTES
Pt requesting to see doctor, is not interested in talking to others and stated that they would just read their book until being able to meet with the doctor.

## 2021-10-27 NOTE — ED PROVIDER NOTES
"[facility] Unit - Initial Psychiatric Observation Note  [facility] Emergency Department  Observation Initiation Date: Oct 26, 2021    Nimisha Jasso MRN: [id number]   Age: 26 year old YOB: 1995     History     Chief Complaint   Patient presents with     Psychiatric Evaluation     \" I am having suicidal thoughts no plan.\"  \"I dont know why I feel this way\"     HPI  Nimisha \"Keith\" RENATO Jasso is a 26 year old adult with a past history notable for depression, anxiety and insomnia who presents to the ED with suicidal ideation.  They had fleeting thoughts of overdosing on her medications, so patient's roommate has the pills in their possession.  Patient was medically cleared and transferred to [facility] for psychiatric assessment.  On interview, patient appears extremely sad and depressed. They are crying and sobbing. They express feeling exhausted every day, explaining it takes so much energy and effort everyday to keep going. They think of suicide daily. Keith describes poor concentration, feels drowsy and foggy. States these symptoms have been present for over 3 months. They describe this is the worst they have ever felt in life. They share they feel like a burden to others and dislike living this way.  They share they lack enjoyment in life. They have not been able to engage in leisure activities such as drawing and writing.  They have been participating in a virtual therapy group but states this has been making them feel emotionally worse.  They have been on Paxil and Wellbutrin in the past. Currently, their outpatient provider is tapering them off Lexapro while increasing Zoloft. They have not noticed a benefit in the medications at this point. They were started on Buspar 15 mg twice a day about a month ago finding some symptom relief with this.  Trazodone 50 mg has been efficient for sleep.    Past Medical History  Past Medical History:   Diagnosis Date     Depressive disorder      Past Surgical " "History:   Procedure Laterality Date     DENTAL SURGERY       busPIRone (BUSPAR) 15 MG tablet  calcium-vitamin D 500 mg(1,250mg) -200 unit per tablet  escitalopram (LEXAPRO) 5 MG tablet  sertraline (ZOLOFT) 25 MG tablet  sertraline (ZOLOFT) 50 MG tablet  traZODone (DESYREL) 50 MG tablet  buPROPion (WELLBUTRIN SR) 150 MG 12 hr tablet  PARoxetine (PAXIL) 20 MG tablet  PARoxetine (PAXIL) 40 MG tablet      No Known Allergies  Family History  Family History   Problem Relation Age of Onset     Cancer Mother      Diabetes Mother      Thyroid Disease Mother      No Known Problems Father      No Known Problems Brother      Depression Maternal Grandfather      Social History   Social History     Tobacco Use     Smoking status: Never Smoker     Smokeless tobacco: Never Used   Substance Use Topics     Alcohol use: Yes     Drug use: Never      Past medical history, past surgical history, medications, allergies, family history, and social history were reviewed with the patient. No additional pertinent items.       Review of Systems  A complete review of systems was performed with pertinent positives and negatives noted in the HPI, and all other systems negative.    Physical Examination   BP: 115/79  Pulse: 79  Temp: 98.1  F (36.7  C)  Resp: 16  Height: 165.1 cm (5' 5\")  Weight: 68 kg (150 lb)  SpO2: 99 %    Physical Exam  General: Appears stated age.   Neuro: Alert and fully oriented. Extremities appear to demonstrate normal strength on visual inspection.   Integumentary/Skin: no rash visualized, normal color    Psychiatric Examination   Appearance: severe distress  Attitude:  cooperative  Eye Contact:  fair  Mood:  sad  and depressed  Affect:  mood congruent  Speech:  clear, coherent  Psychomotor Behavior:  no evidence of tardive dyskinesia, dystonia, or tics and fidgeting  Thought Process:  logical  Associations:  no loose associations  Thought Content:  no evidence of psychotic thought and passive suicidal ideation " present  Insight:  good  Judgement:  intact  Oriented to:  time, person, and place  Attention Span and Concentration:  limited due to fatigue  Recent and Remote Memory:  intact  Language: able to name/identify objects without impairment  Fund of Knowledge: intact with awareness of current and past events    ED Course        Labs Ordered and Resulted from Time of ED Arrival Up to the Time of Departure from the ED   COVID-19 VIRUS (CORONAVIRUS) BY PCR - Normal    Narrative:     Testing was performed using the [employer]  SARS-CoV-2 & Influenza A/B Assay on the [employer]  [employer]  System.  This test should be ordered for the detection of SARS-COV-2 in individuals who meet SARS-CoV-2 clinical and/or epidemiological criteria. Test performance is unknown in asymptomatic patients.  This test is for in vitro diagnostic use under the FDA EUA for laboratories certified under CLIA to perform moderate and/or high complexity testing. This test has not been FDA cleared or approved.  A negative test does not rule out the presence of PCR inhibitors in the specimen or target RNA in concentration below the limit of detection for the assay. The possibility of a false negative should be considered if the patient's recent exposure or clinical presentation suggests COVID-19.  [facility] are certified under the Clinical Laboratory Improvement Amendments of 1988 (CLIA-88) as qualified to perform moderate and/or high complexity laboratory testing.   VITAL SIGNS   PATIENT CARE ORDER   NOTIFY PROVIDER       Assessments & Plan (with Medical Decision Making)   Patient presenting with suicidal ideation in the context of a severe major depressive episode . Nursing notes reviewed noting no acute issues. Patient requesting to sleep in a soothing room due to high anxiety.    I have reviewed the assessment completed by the [facility].     During the observation period, the patient did not require medications for agitation, and did not require  restraints/seclusion for patient and/or provider safety.     The patient was found to have a psychiatric condition that would benefit from an observation stay in the emergency department for further psychiatric stabilization and/or coordination of a safe disposition. The observation plan includes serial assessments of psychiatric condition, potential administration of medications if indicated, further disposition pending the patient's psychiatric course during the monitoring period.     Preliminary diagnosis:    ICD-10-CM    1. Suicidal ideation  R45.851    2. Severe episode of recurrent major depressive disorder, without psychotic features (H)  F33.2         Treatment Plan:  -Place on observation status for continuous safety monitoring and mood stabilization.  Patient presents with high anxiety and depression. They were reluctant to stay, citing they feel uncomfortable being here.  After education patient on the benefits of staying the night and being re-evaluated in the morning, patient agreed to stay. At this time, they do not meet criteria to be placed under an involuntary hold.  -Atarax 25-50 mg every 6 hours as needed for anxiety.  Encouraged patient to take a dose tonight to lessen the anxiety of staying.   -Patient has a psychiatric provider managing their medications. They did not want to make any changes as they have an appointment on 11/5/21. They report the plan is next week to decrease the Lexapro to 5 mg then stop after a week.  I'm curious if this could not be done sooner as they have been on Zoloft for over 3 weeks now.  They did indicate they had withdrawals coming off of Paxil, which makes since given it's short half life and being on it for over 2 year, but one would think they would be able to taper off Lexapro without issue.  -Continue Trazodone 50 mg for sleep.  -Continue Buspar 15 mg twice a day for anxiety.  -Reassess patient in the morning.. They would be interested in exploring other  outpatient options for therapy as they have not been finding the virtual group helpful. One wonders if she would benefit from something like IOP.    --  THOMAS Solis CNP   [facility] EMERGENCY DEPT  [facility] Unit  10/26/2021        Kristal Gamboa APRN CNP  10/26/21 1821

## 2021-10-27 NOTE — CONSULTS
"10/26/2021  Nimisha \"Keith\" RENATO Jasso 1995     [facility] Mental Health Assessment:    Started at: 2026  Completed at: 5344  What type of assessment are you doing today? Crisis assessment    1.  Presenting Problem:      Referral Method to ED? Self and Community Provider     What brings the patient to the ED today? Patient a 26 year old individual, identifies by the name of Keith and uses they/them pronouns, presents to [facility] after medical clearance in the ED with suicidal ideations and significant depression.  They share that they are hopelessness, feels like they are not doing anything right, and they don't know what else to do.  Patient endorses suicidal thoughts however states \"I don't think I will ever kill myself\" as they are scared of pain.  The plan associated with the current SI is overdose on sleeping pills and patient has already given all their medication to their roommate, Alanna.  During assessment, patient acknowledges that adding steps to the process of getting to identified plan reduces the likelihood of actually overdosing.     Patient shares that they have been on medications for depression since high school however their depression has been on the increase since May/June of this year. When asked if there was any specific event or situation during that timeframe, patient endorsed they ended a three-and-a-half year unhealthy romantic relationship with their girlfriend. Patient and this now-ex are still in communication with each and recognize they are better off friends than anything.  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      Patient endorses depression, anxiety, feeling stuck and stagnant with life right now.  They are not getting quality sleep despite taking Trazodone to aid in falling asleep.  Patient shares they stay up late and sleep in late and will feel " foggy during the day with difficulty concentrating.  They have reported this concern to both their psychiatrist and individual therapy however the psychiatry is not going to make any changes at this time.  Their appetite is poor and recognize that it is difficult to eat even when they are hungry.  Patient has started to take vitamins and probiotics to ensure they are getting at least some nutritional content daily. Patient denies AH, VH, and delusions; while endorsing some paranoia related to thinking people hate them and self-believing they are a failure.      They have no previous inpatient hospitalization and expresses a strong desire to remain out of inpatient settings.  Patient was not too sure about spending the night on [facility] however came around to the idea with coaching from CNP provider tonight.  Patient has been diagnosed with depression, anxiety, and insomnia in the past and has been discussing with their current providers getting tested for ADHD.  Patient shares the experience of a recurring thought of the past few months where they do not see themselves having a life past their mid-20s. They also do not have any life goals for the future which is contributing to the depression and anxiety.     Patient lives in a shared house with two roommates and they all have lived together since January 2021.  Patient shares that they get annoyed with one of the roommates more often because the roommate does not do their chores as frequently or well as patient would like.  Patient also shares insight that they do, sometimes also, neglect their chores.  Their support network includes roommates x2 and therapist; their parents are in the 2nd tier of support as parents live in [place] and are not very connected at the moment.     Patient does not use alcohol regularly, no drug use, no legal concerns, unsure about family history of MH and physical health as that is not often talked about, no sexual or physical abuse  reported, and no trauma noted.  Patient is well connected with service providers - PCP, psychiatry, individual therapy, and outpatient group therapy.  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      Patient does not have a lot of forward thinking nor is goal oriented, presents as tearful and avoided eye contact for most of the assessment. [facility] provider was able to get patient to agree to staying overnight while starting on Hydroxyzine PRN.      The following information was received from Alanna whose relationship to the patient is roommate and friend. Information was obtained phone call. Their phone number is [phone] and they last had contact with patient on today.    What happened today: Alanna was at work when patient called a Lyft to bring themselves to the ED for further support and treatment.  Alanna shares that she and patient have a good friendship including open communication.  Patient has told Alanna about their SI and plan so Alanna confirms having all the sleep medication in her possession and hidden from patient.  Alanna acknowledges that patient does not handle goodbyes very well and they were both in an acting production recently that closed.  Alanna collaborated patient's sense of feeling stuck in life and not sure what is coming next.    Alanna does not have any safety concerns if patient was to return home tonight and she would be there to support patient.  She was informed later that patient has agreed to spend the night which relieved Alanna.    What is different about patient's functioning: increased anxiety related to social interactions outside of the roommates.  Aware of current suicidal ideations.     Concern about alcohol/drug use: No    What do you think the patient needs: unsure but supports an overnight stay if provider and patient determine that    Has patient made comments about wanting to kill  themselves/others:  Yes Alanna reports that she and patient have open communication regarding this.  Alanna currently has all of patient's sleep medications as that was the plan for patient.     If d/c is recommended, can they take part in safety/aftercare planning: Yes Alanna is available and willing to participate in discharge and safety planning tomorrow.     Has this happened before? Yes lifetime of depression however this is the most severe their depression has gotten    Duration of presenting problem: a few months    Additional Stressors: n/a    2.  Risk Assessment:  Suicide and Self-Harm    ESS-6  1.a. Over the past 2 weeks, have you had thoughts of killing yourself? Yes   1.b. Have you ever attempted to kill yourself and, if yes, when did this last happen? No  2. Recent or current suicide plan? Yes  3. Recent or current intent to act on ideation? No  4. Lifetime psychiatric hospitalization? No  5. Pattern of excessive substance use? No  6. Current irritability, agitation, or aggression? No  ESS-6 Score: mild    SI: Active  Plan: Yes overdose on sleeping medications  Intent: No   Prior Attempts: No     Protective Factors: supportive and trusting roommate (Alanna), took leave from work to focus on mental health, engaged with psychiatry and therapy currently, stable housing    Hopes and goals for the future: unsure as she did not think she would live to be or past their mid-20s    Coping Skills: What helps and doesn't help? Playing table top role-playing games (Dungeons and Dragons  They are in three different groups of people that play these games weekly.     Additional Risk Factors Related to Safety and Suicide: Yes: Depressive symptoms, Isolation, Lack of support and Recent loss    Is the patient engaged in self injurious behaviors? No     Risk to Others    Aggressive/Assaultive/Homicidal Risk Factors: No     Duty to Warn? No     Was a Child Protection Report Made? No       Was a Adult Protection Report Made? No         Sexually inappropriate behavior? No        Vulnerability to sexual exploitation? No     Additional information: n/a      3. Mental Health Symptoms and Substance Use  Current Symptoms and Mental Health History    GAIN Short Screener (GAIN-SS) administered? NA    Attention, Hyperactivity, and Impulsivity Symptoms      Patient reported symptoms related to hyperactivity, inattention, or impulsivity? No    Anxiety Symptoms    Patient reported anxiety symptoms? Yes: Generalized Symptoms: Avoidance and Cognitive anxiety - feelings of doom, racing thoughts, difficulty concentrating        Behavioral Difficulties    Patient reported behavioral difficulties? Yes: Withdrawal/Isolation     Mood Symptoms    Patient reported mood disorder symptoms? Yes: Appetite change/weight change , Excessive guilt , Feelings of hopelessness , Isolative , Loss of interest / Anhedonia , Low self esteem , Sad, depressed mood , Sleep disturbance  and Thoughts of suicide/death      Eating Disorders and Appetite Disturbance      Patient reported appetite symptoms? No     SCOFF  Do you make yourself sick (induce vomiting) because you feel uncomfortably full? No   Do you worry that you have lost Control over how much you eat? No  Have you recently lost more than 14 lb in a three-month period? No   Do you think you are too fat, even though others say you are too thin? No   Would you say that food dominates your life? No  SCOFF Score: 0    Patient reported appetite or eating disorder symptoms? No    Interpersonal Functioning     Patient reported difficulties that may be associated with personality and interpersonal functioning? Yes: Cognitive Distortions and Impaired Interpersonal Functioning    Learning Disabilities/Cognitive/Developmental Disorders    Patient reported concerns related to learning disabilities, cognitive challenges, and/or developmental disorders? No     General Cognitive Impairments    Patient reported symptoms of cognitive  impairments? No  If yes, complete Mini-Cog Assessment.    Sleep Disturbance    Patient reported difficulties with sleep? Yes: Difficulty falling asleep      Psychosis Symptoms    Patient reported symptoms of psychosis? Yes: Paranoia      Trauma and Post-Traumatic Stress Disorder    Physical Abuse: No   Emotional/Psychological Abuse: Yes did not disclose any further information  Sexual Abuse: No  Loss of a friend or family member to suicide: No  Other Traumatic Event: No     Patient reported trauma related symptoms? No     Impact of Mental Health on Functioning      Negative Impact Score: 8/10   Subjective Impact on functioning: low energy, difficulty concentrating on tasks, not working, feeling stuck  How do symptoms vary from baseline? Increased SI, intense depression    Current and Historical Substance Use Note:    IIs there a history of, or current, substance use? No     Have you been to chemical dependency treatment or detox before? No     CAGE-AID    Have you felt you ought to cut down on your drinking or drug use? No     Have people annoyed you by criticizing your drinking or drug use? No   Have you felt bad or guilty about your drinking or drug use? No  Have you ever had a drink or used drugs first thing in the morning to steady your nerves or to get rid of a hangover? No   CAGE-AID Score: 0/4    Drug screen completed? No   BAL/Breathalyzer completed? No       Mental Status Exam:    Affect: Flat  Appearance: Appropriate   Attention Span/Concentration: Attentive    Eye Contact: Avoidant  Fund of Knowledge: Appropriate   Language /Speech Content: Fluent  Language /Speech Volume: Soft   Language /Speech Rate/Productions: Normal and Slow   Recent Memory: Intact  Remote Memory: Intact  Mood: Depressed and Sad   Orientation:   Person: Yes   Place: Yes  Time of Day: Yes   Date: Yes   Situation (Do they understand why they are here?): Yes   Psychomotor Behavior: Normal   Thought Content: Clear and Suicidal  Thought  Form: Intact    4. Social and Environmental Conditions   Is the patient their own guardian? 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 No    Spiritual and Cultural Influences  Do you have any Orthodoxy beliefs that are important in your life? No     Do you have any cultural influences in your life that impact your mental health care? No        5. Psychiatric History, Medical History, and Current Care      Patient Mental Health Services   Does the patient have a history of mental health concerns/diagnoses? Yes depression, anxiety, insomnia, and will be testing for ADHD in the future     Current Providers  Primary Care Provider: Yes Martin Sanchez MD; [facility]   Psychiatrist: Yes Brunilda Montes at [facility]   Therapist: Yes Katharine Toro - private practice   : No   [facility]: No   ACT Team: No   Other: No    History of Commitment? No  History of Psychiatric Hospitalizations? No   History of programmatic care? Yes outpatient therapy group through [facility]    Family Mental Health History   Family History of Mental Health or Chemical Dependency Issues? Yes depression, Bipolar, OCD, breast cancer, pre-diabetes     Development and Physical Health Challenges  Delays or concerns meeting developmental milestones? No  Current psychotropic medications? Yes Lexapro, Zoloft, Buspar, Trazdone   Medication Compliant? Yes   Recent medication changes? Yes    History of concussion or TBI? No     Additional Information: discontinuation of Wellbutrin, Paxil      6. Collateral Information  and Collaboration    Collaboration with medical staff:Referral Information:   Medical Records, Psychiatry and Nursing     Collateral Information/Sources: Medical Records: Epic and Friend: Alanna    7. Assessment and Diagnosis  Assessment of patient strengths and vulnerabilities    Strengths, Protective Factors, & Community Resources: see narrative    Patient skills, abilities, and coping skills (what is going well?): see narrative    Patient vulnerabilities: see narrative    Diagnosis per dx in outpatient therapy group note  F33.1 Major Depression, recurrent, moderate  F41.1 Generalized Anxiety Disorder    8.Therapeutic Methodologies Utilized in Assessment    Psychotherapy techniques and/or interventions used: Establishing rapport, Active listening, Assess dimensions of crisis, Apply solution-focused therapy to address current crisis, Identify additional supports and alternative coping skills, Establish a discharge plan and Brief Supportive Therapy    9. Patient Care/Treatment Plan  Summary of Patient Presentation and needs  What are the basic needs for this patient in this moment? Patient would benefit from overnight observation on [facility] to get a respite from community and life stressors and triggers for their depression.  Patient will have discussion about medication changes with provider.       Consultations :  Attending provider consulted? Yes  Attending Name: THOMAS Looney CNP   Attending concurs with disposition? Yes     Recommended disposition: Individual Therapy, Medication Management, Programmatic Care: group therapy and Other: observation overnight to start Atarax and reassess SI in the morning     Does the patient agree with the recommended level of care? Yes    Final disposition: Individual therapy , Medication management, Programmatic care: continue with outpatient group therapy and Other: stay here in observation status, start PRN Atarax and reassess SI in the morning    Disposition Details: With  "heavy coaching from provider, patient agreed to spend the night on [facility], start PRN Atarax, and reassess SI in the morning.     If Inpatient, is patient admitted voluntary? N/A   Patient aware of potential for transfer if there is not appropriate placement? NA  Patient is willing to travel outside of the [facility] for placement? NA   Central Intake Notified? No    10. Patient Care Document: Safety and After Care Planning:          Safety Plan Provided? Safety Plan completed with outpatient group therapist yesterday and is copied below....    Step 1: Warning signs / cues (Thoughts, images, mood, situation, behavior) that a crisis may be developing:     ? Thoughts: \"I don't matter\", \"People would be better off without me\" and \"I'm a burden\"  ? Images: obsessive thoughts of death or dying: someone harming me and visions of harm:   ? Thinking Processes: ruminations (can't stop thinking about my problems), racing thoughts, intrusive thoughts (bothersome, unwanted thoughts that come out of nowhere), highly critical and negative thoughts and disorganized thinking.  ? Mood: worsening depression, hopelessness and helplessness  ? Behaviors: isolating/withdrawing  and can't stop crying  ? Situations: None   ?    Step 2: Coping strategies - Things I can do to take my mind off of my problems without contacting another person (relaxation technique, physical activity):     ? Distress Tolerance Strategies:  arts and crafts: Draw  ? Physical Activities: go for a walk  ? Focus on helpful thoughts:  \"This is temporary\", \"I will get through this\" and \"It always passes\"     Step 3: People and social settings that provide distraction:                 Roommates              watch tv, play dungeons and dragoons               Step 4: Remind myself of people and things that are important to me and worth living for:  Mother and friends     Step 5: When I am in crisis, I can ask these people to help me use my safety plan:                 Gisselle" Romero (mother) [phone]     Step 6: Making the environment safe:      ? remove alcohol, remove drugs and be around others     Step 7: Professionals or agencies I can contact during a crisis:     ? Suicide Prevention Lifeline: 0-268-592-QOIZ (2237)  ? Crisis Text Line Service (available 24 hours a day, 7 days a week): Text [place] to 170108  ? Call  **CRISIS (247925) from a cell phone to talk to a team of professionals who can help you.              [facility] Crisis Line Number: [phone]     Follow-Up Plans and Providers: established community providers    Follow-Up Plan:  After care plan provided to the patient/guardian by: not at this time  After care plan provided to any additional sources/parties? No    Duration of face to face time with patient in minutes: 1.25 hrs    CPT code(s) utilized: 27708 - Psychotherapy for Crisis - 60 (30-74*) min      HENRIETTA Curtis

## 2021-10-27 NOTE — ED NOTES
Pt tearful and spent some time talking to [facility] and petting the therapy dog Osman. Pt continues to be dysregulated and was agreeable to taking PRN hydroxyzine.

## 2021-10-27 NOTE — ED NOTES
Pt encouraged to call for breakfast and was given menu. Pt reported that they would and turned around to go back to sleep.

## 2021-10-27 NOTE — ED NOTES
Keith is a 26 year old female with history of depression and suicidal ideation received from ED due to increasing suicidal ideation. Reports recent psychiatric medication changes, no previous mental health hospitalizations and depression since high school. Patient appears said and sheds some tears during intake interview. Patient currently denies active SI/HI and contracts for safety.     Observation sheet given to patient and discussed.    Nursing and risk assessments completed. Assessments reviewed with [facility] and physician. Video monitoring in progress, patient informed.  Admission information reviewed with patient. Patient given a tour of [facility] and instructions on using the facility. Questions regarding [facility] addressed. Pt search completed and belongings inventoried.

## 2021-10-27 NOTE — ED NOTES
Pt reports that they did not have a good night sleep but was unable to identify why. Pt took morning medications but refused lexapro 10mg as that is no longer the correct dose. Pt reports taking 5mg of lexapro which has been ordered.

## 2021-10-27 NOTE — ED PROVIDER NOTES
"EmPATH Unit - Psychiatric Observation Discharge Summary  [facility] Emergency Department  Discharge Date: 10/27/2021    Nimisha Jasso MRN: [id number]   Age: 26 year old YOB: 1995     Brief HPI & Initial ED Course     Chief Complaint   Patient presents with     Psychiatric Evaluation     \" I am having suicidal thoughts no plan.\"  \"I dont know why I feel this way\"     HPI  Nimisha Jasso is a 26 year old adult with history notable for major depressive disorder and anxiety who presented to the emergency room for evaluation of worsening depressed mood and suicidal thoughts.  They were transferred to the empath unit for psychiatric assessment.  They initially met with KENJI Giraldo who resumed the patient's outpatient medications while noting they were transitioning from Lexapro to Zoloft.  Several prior antidepressant trials were noted.  The patient reported limited response to their current outpatient program which involves medication management, individual therapy, and IOP.  They are being reassessed today.  On examination, the patient continues to endorse severe depressive symptoms.  They explained that the hospital environment is worsening their symptoms by exacerbating insomnia.  As a result, they anticipate worsening suicidal thoughts in the setting.  The patient explains having various comfort measures at home and anticipates more therapeutic value from being at home.  They are tolerating the medication transition well and prefer to continue with the plan that is currently outlined regarding their medications.  The patient expressed feeling helpless due to ongoing symptomology and was primarily seeking additional resources that they may pursue if symptoms do not improve in the upcoming weeks.  In summary, they fear not gaining a response to the current medication plan and anticipate further decompensation without adequate resources in place.  They are requesting to discharge home today while " "demonstrating ability to contract for safety.        Physical Examination   BP: (!) 89/55  Pulse: 79  Temp: 97.9  F (36.6  C)  Resp: 14  Height: 165.1 cm (5' 5\")  Weight: 74.1 kg (163 lb 6.4 oz)  SpO2: 97 %    Physical Exam  General: Appears stated age.   Neuro: Alert and fully oriented. Extremities appear to demonstrate normal strength on visual inspection.   Integumentary/Skin: no rash visualized, normal color    Psychiatric Examination   Appearance: awake, alert  Attitude:  cooperative  Eye Contact:  poor   Mood:  depressed  Affect:  mood congruent  Speech:  clear, coherent  Psychomotor Behavior:  no evidence of tardive dyskinesia, dystonia, or tics  Thought Process:  logical and linear  Associations:  no loose associations  Thought Content:  no evidence of psychotic thought and passive suicidal ideation present  Insight:  fair  Judgement:  intact  Oriented to:  time, person, and place  Attention Span and Concentration:  limited  Recent and Remote Memory:  fair  Language: able to name/identify objects without impairment  Fund of Knowledge: intact with awareness of current and past events    Results        Labs Ordered and Resulted from Time of ED Arrival to Time of ED Departure   COVID-19 VIRUS (CORONAVIRUS) BY PCR - Normal       Result Value    SARS CoV2 PCR Negative         Observation Course   The patient was found to have a psychiatric condition that would benefit from an observation stay in the emergency department for further psychiatric stabilization and/or coordination of a safe disposition. The plan upon observation admission included serial assessments of psychiatric condition, potential administration of medications if indicated, further disposition pending the patient's psychiatric course during the monitoring period.     Serial assessments of the patient's psychiatric condition were performed. Nursing notes were reviewed. During the observation period, the patient did not require medications for " agitation, and did not require restraints/seclusion for patient and/or provider safety.     After a period of working with the treatment team on the EmPATH unit, the patient's mental state improved to allow a safe transition to outpatient care. After counseling on the diagnosis, work-up, and treatment plan, the patient was discharged. Close follow-up with a psychiatrist and/or therapist was recommended and community psychiatric resources were provided. Patient is to return to the ED if any urgent or potentially life-threatening concerns.     Discharge Diagnoses:   Final diagnoses:   Suicidal ideation   Severe episode of recurrent major depressive disorder, without psychotic features (H)       Treatment Plan:  -Continue the patient's current medication plan without change.  They are currently in transition from one antidepressant to another, as directed by their outpatient provider.  The patient prefers to continue with this plan.  -Additional interventions were reviewed with the patient including the option to pursue TMS, ketamine, etc.The patient will be provided with the contact information for the neuromodulation clinic where they may further explore interventional treatment options.  The patient was also educated regarding the option to pursue GeneSight testing to further optimize their medication plan.  -Provided with resources for other intensive outpatient programs that they may pursue if they remain unsatisfied with her current program.  -Discharge home today per the patient's request.    At the time of discharge, the patient's acute suicide risk was determined to be low due to the following factors: Reduction in the intensity of mood/anxiety symptoms that preceded the admission, denial of suicidal thoughts, denies feeling helpless or helpless, not currently under the influence of alcohol or illicit substances, denies experiencing command hallucinations, no immediate access to firearms. The patient's acute  risk could be higher if noncompliant with their treatment plan, medications, follow-up appointments or using illicit substances or alcohol. Protective factors include: social supports, stable housing.    --  Nolberto López MD  [facility] EMERGENCY DEPT  EmPATH Unit  10/27/2021      Nolberto López MD  10/27/21 1634       Nolberto López MD  10/27/21 2044

## 2021-10-27 NOTE — TREATMENT PLAN
EmPATH Treatment Plan    Client's Name: Nimisha Jasso  YOB: 1995    DSM-5 Diagnoses:   per dx in outpatient therapy group note  F33.1 Major Depression, recurrent, moderate  F41.1 Generalized Anxiety Disorder    Psychosocial / Contextual Factors: Patient presented to the emPATH unit with the following concerns: Presents with hopelessness, increased depression, SI that is increasing in frequency and intensity with plan to overdose on sleeping pills.  They are very tearful throughout assessment and expresses a desire to return home and strongly requests not to go inpatient.      Anticipated number of sessions or this episode of care: 1-4    MeasurableTreatment Goal(s) related to diagnosis / functional impairment(s)    Goal 1: Patient will reduce their SI intensity and establish sense of hope for self and the future.    Objective #A     Patient will identify positives in their life by making a list of positives: relationships, achievements, support, etc.  Status: New as of October 27, 2021    Intervention(s)  LMHP will assist patient in exploring/identifying positives.     Objective #B  Patient will explore coping strategies by discussing things that have or may help to distract them or things that help them to? feel better.   Status: New as of October 27, 2021    Intervention(s)  LMHP will assist patient in developing coping strategies, ie: physical exercise, less internal focus, increased social activity, more expression of feeling, reaching out to others.      Goal 2: Patient will increase awareness of depression symptoms and their impact on functioning and develop skills to reduce negative impact.     Objective #A     Patient will describe thoughts, feelings, and actions associated with depression.   Status: New as of October 27, 2021    Intervention(s)  LMHP will explore and process with patient how depression has impacted them.               Appearance:   Appropriate    Eye  Contact:   Poor   Psychomotor Behavior: Normal    Attitude:   Cooperative  Indifferent   Orientation:   All   Speech    Rate / Production: Normal     Volume:  Soft    Mood:    Depressed  Sad  Apathetic   Affect:    Flat    Thought Content:  Clear  Suicidal   Thought Form:  Logical    Insight:    Fair           PLAN:   Patient will board in emPATH until patient and treatment deem it appropriate to either discharge or admit to a higher level of care.       Larry Hong, LGSW                                                      ________

## 2021-10-28 ENCOUNTER — TELEPHONE (OUTPATIENT)
Dept: BEHAVIORAL HEALTH | Facility: CLINIC | Age: 26
End: 2021-10-28

## 2021-10-28 ENCOUNTER — HOSPITAL ENCOUNTER (OUTPATIENT)
Dept: BEHAVIORAL HEALTH | Facility: CLINIC | Age: 26
End: 2021-10-28
Attending: PSYCHIATRY & NEUROLOGY
Payer: COMMERCIAL

## 2021-10-28 DIAGNOSIS — Z71.89 OTHER SPECIFIED COUNSELING: ICD-10-CM

## 2021-10-28 PROCEDURE — 90853 GROUP PSYCHOTHERAPY: CPT | Mod: 95

## 2021-10-28 PROCEDURE — G0177 OPPS/PHP; TRAIN & EDUC SERV: HCPCS | Mod: GT

## 2021-10-28 NOTE — ED NOTES
Patient agreeable to discharge plan. Discharge instructions reviewed with patient including follow-up care plan. Reviewed safety plan and outpatient resources. Denies SI and HI. All belongings that were brought into the hospital have been returned to patient. Escorted off the unit at 2358 accompanied by Empath staff. Discharged to home via lyft transportation.

## 2021-10-28 NOTE — TELEPHONE ENCOUNTER
Spoke with patient regarding referral. Patient started back with IOP today and is scheduled for individual therapy on 11/02. Patient requested to be seen one time to give additional support. Scheduled for 10/29/21 @ 2:00pm.      [facility] Evans [facility]    ----- Message from ANAIS Serrano sent at 10/28/2021 12:06 AM CDT -----  Regarding: referral  Transition Clinic Referral     Type of Referral:    Therapy       Referring Provider Name: ANAIS Serrano    Referring Provider Contact Phone Number: [phone]    Reason for Transition Clinic Referral: would like to be seen by the end of the week    Next Level of Care Patient Will Be Transitioned To: resume IOP, therapy appt Tuesday    Start Date for Next Level of Care (Required): 11/2/21     Type of Clinical Assessment Completed (Crisis, DA, MARILYN, etc.): crisis    Date Clinical Assessment was Completed: 10/26/21    Diagnosis: MDD, recurrent, moderate, BRISA    What Would Be Helpful from the Transition Clinic: one therapy session before the end of the week     Needs: NO    Does Patient Have Access to Technology: yes    Patient E-mail Address: [email or web address]    Current Patient Phone Number: [phone]    Clinician Gender Preference (if applicable): NO    ANAIS Serrano

## 2021-10-28 NOTE — ED NOTES
[facility]    Client Name: Nimisha Jasso  Date: October 27, 2021    Presenting issue that brought patient to the emPATH unit: Patient presented voluntarily due to worsening depression and suicidal ideation.    Current presentation on the unit: Patient has been cooperative throughout their stay. They have continued to struggle with depression and anxiety, though were able to engage and process through it with writer.     Current risk to self or others? No    Summary of therapeutic interventions completed with patient: Time was spent processing anxiety regarding their desire to return home vs boarding on EmPATH overnight. There were episodes of escalating anxiety, during which they were able to self-soothe and regulate with minimal coaching. Suicidal ideation was discussed and risk assessed. Costs and benefits of dispo choices were examined. Cognitive distortions were identified and reframed as appropriate. Patient was affirmed for their decision to seek care, and the courage exhibited in doing so. Patient's definition of progress was explored, and emphasis was made on their willingness and desire to continue to pursue treatment options was repeatedly affirmed. Additional treatment options and supportive services were discussed, consent was provided for desired services and referrals were completed accordingly.     Treatment objectives addressed in this session: identifying positives, explore coping strategies and additional mental health resources to aid in their recovery    Progress on treatment goals: Goal 1 met    SAFETY PLAN  Step 1: Warning signs / cues (Thoughts, images, mood, situation, behavior) that a crisis may be developing:    Thoughts: \"I don't matter, people would be better off without me, or I'm a burden\"    Images: seeing things I could use to harm myself, imagining ways of harming myself    Thinking Processes: ruminations (can't stop thinking about my problems), " "racing thoughts, intrusive thoughts (bothersome, unwanted thoughts that come out of nowhere), highly critical and negative thoughts and disorganized thinking.    Mood: worsening depression, hopelessness and helplessness    Behaviors: isolating/withdrawing, can't stop crying, struggling to make decisions     Situations: None   Step 2: Coping strategies - Things I can do to take my mind off of my problems without contacting another person (relaxation technique, physical activity):    Distress Tolerance Strategies: breathing, cold pack on my face, crossword puzzles, Sudoku    Physical Activities: go for a walk    Focus on helpful thoughts:  \"This is temporary.\" \"I will get through this.\" and \"It always passes.\"  Step 3: People and social settings that provide distraction:  Roommates, watch tv, play dungeons and dragoons           Step 4: Remind myself of people and things that are important to me and worth living for: Family and friends  Step 5: When I am in crisis, I can ask these people to help me use my safety plan: Gisselle Jasso (mother) [phone], my roommates  Step 6: Making the environment safe: Just being in the presence of others  Step 7: Professionals or agencies I can contact during a crisis: Katharine, [phone]    [facility] Crisis Line Number: 954-948-8144     Suicide Prevention Lifeline: 5-533-033-TALK (8227)    Crisis Text Line Service (available 24 hours a day, 7 days a week): Text [place] to 887576    Call  **CRISIS (407287) from a cell phone to talk to a team of professionals who can help you.    Mental Status:     Appearance:   Appropriate    Eye Contact:   Good    Psychomotor Behavior: Normal    Attitude:   Cooperative  Interested   Orientation:   All   Speech    Rate / Production: Normal/ Responsive    Volume:  Soft    Mood:    Anxious  Sad    Affect:    Appropriate  Labile    Thought Content:  Clear    Thought Form:  Goal Directed    Insight:    Good     Plan: Patient will discharge home to " follow up with established outpatient providers. An MARCELLE was signed for providers to receive an update from this visit. Referrals were completed to [facility] for [facility] and [facility] Crisis Stabilization. Information was provided on DBT.     Disposition: Programmatic care: resume IOP    Rationale for disposition: Patient is future-oriented and able to contract for safety. They continue to endorse passive suicidal ideation, which is baseline. They deny access to guns, as there are none in their home, and their medications are currently being controlled by a friend/roommate. There is no immediate access to lethal means identified. They would like to sleep in the comfort of their own bed, have access to the foods they are craving, and be in communication with their support system. They agree to abstain from mood-altering substances, take their medications as prescribed, follow up with established providers, and call 911 or go to the ER if their suicidal ideation should worsen.     Reviewed assessment with attending provider: Dr. López     Total time spent with patient: 2.0 hrs     CPT code: 12573 - Psychotherapy (with patient) - 60 (53+*) min    ANAIS Serrano

## 2021-10-28 NOTE — GROUP NOTE
Process Group Note    PATIENT'S NAME: Nimisha Jasso  MRN:   [id number]  :   1995  ACCT. NUMBER: [id number]  DATE OF SERVICE: 10/28/21  START TIME:  1:00 PM  END TIME:  1:50 PM  FACILITATOR: Fatoumata Pagan LMFT  TOPIC:  Process Group    Diagnoses:  Major Depressive Disorder, Recurrent Episode, Moderate   Generalized Anxiety Disorder.    [facility] Day Treatment  TRACK: 1B    NUMBER OF PARTICIPANTS: 4                                      Service Modality:  Video Visit     Telemedicine Visit: The patient's condition can be safely assessed and treated via synchronous audio and visual telemedicine encounter.      Reason for Telemedicine Visit: Services only offered telehealth    Originating Site (Patient Location): Patient's home    Distant Site (Provider Location): Provider Remote Setting- Home Office    Consent:  The patient/guardian has verbally consented to: the potential risks and benefits of telemedicine (video visit) versus in person care; bill my insurance or make self-payment for services provided; and responsibility for payment of non-covered services.     Patient would like the video invitation sent by:  My Chart    Mode of Communication:  Video Conference via Medical Zoom    As the provider I attest to compliance with applicable laws and regulations related to telemedicine.                Data:    Session content: At the start of this group, patients were invited to check in by identifying themselves, describing their current emotional status, and identifying issues to address in this group.   Area(s) of treatment focus addressed in this session included Symptom Management, Personal Safety and Community Resources/Discharge Planning.    Keith reported feeling  a little rough  today. Reported checking into the emPATH unit on Tuesday, indicates that this was the first time being admitted like this. Patient identified the following goal(s) to work towards today:  [facility] Patient plans to use the following skills to achieve their goal: try to go about normal day, maybe play a video game or watch something, get back into some old hobbies like reading.  Patient anticipates the following barriers that may interfere with achieving their goal: feeling guilty for only spending one day and one night at [facility]. Denies safety concerns, had concerns yesterday but happy to be home today. Denies chemical use, and reports taking their medications as prescribed. Patient reports feeling proud of/grateful for: coming home from [facility] last night, for therapist support yesterday. Patient asked for the following supports from the group today: just be here and listen. Keith processed with the group the experience at [facility], reported anxiety about not staying longer at [facility]. Discussed plan for coping today, including plan for food.     Therapeutic Interventions/Treatment Strategies:  Psychotherapist offered support, feedback and validation and reinforced use of skills. Treatment modalities used include Motivational Interviewing, Cognitive Behavioral Therapy and Dialectical Behavioral Therapy. Interventions include Coping Skills: Discussed use of self-soothe skills to decrease distress in the body and Assisted patient in identifying 1-2 healthy distraction skills to reduce overall distress and Relapse Prevention: Facilitated understanding the importance of awareness of factors that contribute to relapse .    Assessment:    Patient response:   Patient responded to session by accepting feedback, giving feedback, listening, focusing on goals, being attentive and accepting support    Possible barriers to participation / learning include: and no barriers identified    Health Issues:   None reported       Substance Use Review:   Substance Use: No active concerns identified.    Mental Status/Behavioral Observations  Appearance:   Appropriate   Eye  Contact:   Good   Psychomotor Behavior: Normal   Attitude:   Cooperative  Pleasant  Orientation:   All  Speech   Rate / Production: Normal    Volume:  Normal   Mood:    Normal Sad   Affect:    Appropriate  Worrisome   Thought Content:   Clear  Thought Form:  Coherent  Logical     Insight:    Good  and Fair     Plan:     Safety Plan: Committed to safety and agreed to follow previously developed safety coping plan.     No current safety concerns identified.  Recommended that patient call 911 or go to the local ED should there be a change in any of these risk factors.     Barriers to treatment: None identified    Patient Contracts (see media tab):  None    Substance Use: Not addressed in session     Continue or Discharge: Patient will continue in Adult Day Treatment (ADT)  as planned. Patient is likely to benefit from learning and using skills as they work toward the goals identified in their treatment plan.      Fatoumata Pagan, ISAIASFT  October 28, 2021

## 2021-10-28 NOTE — GROUP NOTE
Psychoeducation Group Note    PATIENT'S NAME: Nimisha Jasso  MRN:   [id number]  :   1995  ACCT. NUMBER: [id number]  DATE OF SERVICE: 10/28/21  START TIME:  2:00 PM  END TIME:  2:50 PM  FACILITATOR: Nehemiah Freeman OTR/L  TOPIC: MH Life Skills Group: Resiliency Development                                    Service Modality:  Video Visit     Telemedicine Visit: The patient's condition can be safely assessed and treated via synchronous audio and visual telemedicine encounter.      Reason for Telemedicine Visit: Services only offered telehealth    Originating Site (Patient Location): Patient's home    Distant Site (Provider Location): Provider Remote Setting- Home Office    Consent:  The patient/guardian has verbally consented to: the potential risks and benefits of telemedicine (video visit) versus in person care; bill my insurance or make self-payment for services provided; and responsibility for payment of non-covered services.     Mode of Communication:  Video Conference via Medical Zoom    As the provider I attest to compliance with applicable laws and regulations related to telemedicine.       [facility] Adult Mental Health Day Treatment  TRACK: 1B    NUMBER OF PARTICIPANTS: 4    Summary of Group / Topics Discussed:  Resiliency Development:  Coping Skills(Resources to [place] with Stress): Patients were taught how to identify stressors, signs of stress, coping skills, and prevention strategies for overall stress management.  Patients were given the opportunity to identify both ongoing and acute mental health symptoms and how to effectively manage these symptoms by developing an effective aftercare plan.  Patients increased awareness of community based resources.    Patient Session Goals / Objectives:    Identified how using coping skills can be used for symptom and stress management       Improved awareness of individualed symptoms and stressors and how to effectively cope     Established a  relapse prevention plan to practice these skills in their own environments    Practiced and reflected on how to generalize taught skills to their everyday life          Patient Participation / Response:  Fully participated with the group by sharing personal reflections / insights and openly received / provided feedback with other participants.    Demonstrated understanding of content through handot/video/discussion , Verbalized understanding of content and Patient would benefit from additional opportunities to practice the content to be able to generalize it to their everyday life with increased intentionality, consistency, and efficacy in support of their psychiatric recovery    Treatment Plan:  Patient has a current master individualized treatment plan.  See Epic treatment plan for more information.    Nehemiah Freeman, OTR/L

## 2021-10-28 NOTE — DISCHARGE INSTRUCTIONS
"Remember we are here 24/7 if you need us.     Please contact your established providers to notify them of your visit and arrange follow up appointments as soon as possible.     The following referrals have been made on your behalf:     Adult Rehabilitative Mental Health Services (ARMHS) to [facility] Psychology Support Services ([phone])    [facility] Crisis Stabilization ([phone])    [facility] short-term psychotherapy ([phone])    The referral agencies should reach out to you directly to arrange follow up services and care. Please contact them directly with questions. These are all voluntary services.     We also discussed Dialectical Behavioral Therapy (DBT). Dialectical behavior therapy (DBT) provides clients with new skills to manage painful emotions and decrease conflict in relationships. DBT specifically focuses on providing therapeutic skills in four key areas. First, mindfulness focuses on improving an individual's ability to accept and be present in the current moment. Second, distress tolerance is geared toward increasing a person s tolerance of negative emotion, rather than trying to escape from it. Third, emotion regulation covers strategies to manage and change intense emotions that are causing problems in a person s life. Fourth, interpersonal effectiveness consists of techniques that allow a person to communicate with others in a way that is assertive, maintains self-respect, and strengthens relationships. Please talk with Katharine about this option for care. You'll find a list of DBT providers below.     Contact [facility] to discuss transportation benefits you may be eligible for through your healthcare plan. The customer service number is located on the back of your insurance card.     SAFETY PLAN  Step 1: Warning signs / cues (Thoughts, images, mood, situation, behavior) that a crisis may be developing:    Thoughts: \"I don't matter, people would be better off without me, or I'm a " "burden\"    Images: seeing things I could use to harm myself, imagining ways of harming myself    Thinking Processes: ruminations (can't stop thinking about my problems), racing thoughts, intrusive thoughts (bothersome, unwanted thoughts that come out of nowhere), highly critical and negative thoughts and disorganized thinking.    Mood: worsening depression, hopelessness and helplessness    Behaviors: isolating/withdrawing, can't stop crying, struggling to make decisions     Situations: None   Step 2: Coping strategies - Things I can do to take my mind off of my problems without contacting another person (relaxation technique, physical activity):    Distress Tolerance Strategies: breathing, cold pack on my face, crossword puzzles, Sudoku    Physical Activities: go for a walk    Focus on helpful thoughts:  \"This is temporary.\" \"I will get through this.\" and \"It always passes.\"  Step 3: People and social settings that provide distraction:  Roommates, watch tv, play dungeons and dragoons           Step 4: Remind myself of people and things that are important to me and worth living for: Family and friends  Step 5: When I am in crisis, I can ask these people to help me use my safety plan: Gisselle Jasso (mother) [phone], my roommates  Step 6: Making the environment safe: Just being in the presence of others  Step 7: Professionals or agencies I can contact during a crisis: Katharine [phone]    [facility] Crisis Line Number: [phone]     Suicide Prevention Lifeline: 7-757-911-TALK (1998)    Crisis Text Line Service (available 24 hours a day, 7 days a week): Text [place] to 399630    Call  **CRISIS (565016) from a cell phone to talk to a team of professionals who can help you.    Crisis Lines  Crisis Text Line  Text 135916  You will be connected with a trained live crisis counselor to provide support.    Gambling Hotline  1.800.333.hope [4647]    National Hope Line  1.800.SUICIDE [4011753]    National Suicide " "Prevention Lifeline  Free and confidential support  1.008.046.TALK [2458]  http://suicidepreventionlifeline.org    The [employer] Project (LGBTQ Youth Crisis Line)  9.811.635.7311  text START to 284-295    's Crisis Line  4.907.345.7894 (Press 1)  or text 011994    [facility] Mental Health Crisis Response  Within [place], call **CRISIS [**314106] to be connected to a mental health professional who can assist you.      [facility] [place] Crisis  [phone]    [facility] Mobile Crisis  [phone]    [facility] Crisis  [phone]    [facility] Mobile Crisis  [phone] (adults)  [phone] (children)    [facility] Crisis  [phone] (adults)  [phone] (children)    [facility] Mobile Crisis  [phone]    [facility] Mobile Crisis  [phone]    Community Resources  Fast Tracker  Linking people to mental health and substance use disorder resources  fasttrackermn.org     [place] Mental Health Warm Line  Peer to peer support  Monday thru Saturday, 12 pm to 10 pm  717.351.9062 or 9.243.228.7543  Text \"Support\" to 53933    National [place] on Mental Illness (RODOLFO)  867.813.2091 or 1.888.RODOLFO.HELPS    [facility] for Adult [facility] Health  [facility] residents only   [address]  [phone]    Walk-in Counseling Center  Free mental health counseling  [address]  [phone]    Mental Health Apps  My3  https://mySiano Mobile Siliconpp.org/    VirtualHopeBox  https://Euclid.org/apps/virtual-hope-box/    Suicide Safety Plan (Baby World Language)    Calm Harm    The following are a few local places with DBT offerings:     [facility], [phone]    [facility], [phone]    [facility], [phone]    [facility], [phone]    [facility], [phone]    [facility], [phone]    [facility] for Psychology, " [phone]    [facility], [phone]    You may also contact your insurance customer service to inquire about additional in-network care options.

## 2021-10-29 ENCOUNTER — PATIENT OUTREACH (OUTPATIENT)
Dept: NURSING | Facility: CLINIC | Age: 26
End: 2021-10-29

## 2021-10-29 ENCOUNTER — VIRTUAL VISIT (OUTPATIENT)
Dept: BEHAVIORAL HEALTH | Facility: CLINIC | Age: 26
End: 2021-10-29
Payer: COMMERCIAL

## 2021-10-29 DIAGNOSIS — F33.1 MODERATE EPISODE OF RECURRENT MAJOR DEPRESSIVE DISORDER (H): Primary | ICD-10-CM

## 2021-10-29 ASSESSMENT — ANXIETY QUESTIONNAIRES
GAD7 TOTAL SCORE: 17
7. FEELING AFRAID AS IF SOMETHING AWFUL MIGHT HAPPEN: MORE THAN HALF THE DAYS
2. NOT BEING ABLE TO STOP OR CONTROL WORRYING: NEARLY EVERY DAY
GAD7 TOTAL SCORE: 17
8. IF YOU CHECKED OFF ANY PROBLEMS, HOW DIFFICULT HAVE THESE MADE IT FOR YOU TO DO YOUR WORK, TAKE CARE OF THINGS AT HOME, OR GET ALONG WITH OTHER PEOPLE?: EXTREMELY DIFFICULT
1. FEELING NERVOUS, ANXIOUS, OR ON EDGE: NEARLY EVERY DAY
3. WORRYING TOO MUCH ABOUT DIFFERENT THINGS: NEARLY EVERY DAY
6. BECOMING EASILY ANNOYED OR IRRITABLE: MORE THAN HALF THE DAYS
7. FEELING AFRAID AS IF SOMETHING AWFUL MIGHT HAPPEN: MORE THAN HALF THE DAYS
5. BEING SO RESTLESS THAT IT IS HARD TO SIT STILL: MORE THAN HALF THE DAYS
4. TROUBLE RELAXING: MORE THAN HALF THE DAYS
GAD7 TOTAL SCORE: 17

## 2021-10-29 ASSESSMENT — PATIENT HEALTH QUESTIONNAIRE - PHQ9
SUM OF ALL RESPONSES TO PHQ QUESTIONS 1-9: 25
SUM OF ALL RESPONSES TO PHQ QUESTIONS 1-9: 25
10. IF YOU CHECKED OFF ANY PROBLEMS, HOW DIFFICULT HAVE THESE PROBLEMS MADE IT FOR YOU TO DO YOUR WORK, TAKE CARE OF THINGS AT HOME, OR GET ALONG WITH OTHER PEOPLE: EXTREMELY DIFFICULT

## 2021-10-29 NOTE — PROGRESS NOTES
Clinic Care Coordination Contact  Community Health Worker Initial Outreach    Patient accepts CC: No, not at this time. Patient will be sent Care Coordination introduction letter for future reference.   ** Patient is feeling overwhelmed with all the resources given to her. Patient asked for CCC and CHW information and will call CHW back if interested in the future.    Lucille Ray  [facility] Health Worker  [facility] Care Coordination   Office: [phone]

## 2021-10-29 NOTE — LETTER
[facility] CARE COORDINATION  [address]  [place] [address]    October 29, 2021    Nimisha GROSS Romero  [address] [address]      Dear Keith,    I am a clinic community health worker who works with Martin Sanchez MD at [facility]. I wanted to thank you for spending the time to talk with me.  Below is a description of clinic care coordination and how I can further assist you.      The clinic care coordination team is made up of a registered nurse, , financial resource worker and community health worker who understand the health care system. The goal of clinic care coordination is to help you manage your health and improve access to the health care system in the most efficient manner. The team can assist you in meeting your health care goals by providing education, coordinating services, strengthening the communication among your providers and supporting you with any resource needs.    Please feel free to contact me at [phone] with any questions or concerns. We are focused on providing you with the highest-quality healthcare experience possible and that all starts with you.     Sincerely,     Lucille Ray  Community Health Worker  [facility] Care Coordination   Office: [phone]

## 2021-10-29 NOTE — PROGRESS NOTES
Progress Note    Patient Name: Nimisha Jasso  Date: 10/29/2021         Service Type: Individual        Session Start Time: 14:00 Session End Time: 1435     Session Length: 35 minutes    Session #: 1    Attendees: Client    Service Modality:  Video Visit:      Provider verified identity through the following two step process.  Patient provided:  Patient     Telemedicine Visit: The patient's condition can be safely assessed and treated via synchronous audio and visual telemedicine encounter.      Reason for Telemedicine Visit: Services only offered telehealth    Originating Site (Patient Location): Patient's home    Distant Site (Provider Location): [facility] MENTAL HEALTH & ADDICTION SERVICES    Consent:  The patient/guardian has verbally consented to: the potential risks and benefits of telemedicine (video visit) versus in person care; bill my insurance or make self-payment for services provided; and responsibility for payment of non-covered services.     Patient would like the video invitation sent by:  Text to cell phone    Mode of Communication:  Video Conference via Amwell    As the provider I attest to compliance with applicable laws and regulations related to telemedicine.       PHQ-9 / BRISA-7 :     DATA  Interactive Complexity: No  Crisis: No       Progress Since Last Session (Related to Symptoms / Goals / Homework):   Symptoms: Improving decreased suicidal ideation      Episode of Care Goals: No improvement - CONTEMPLATION (Considering change and yet undecided); Intervened by assessing the negative and positive thinking (ambivalence) about behavior change     Current / Ongoing Stressors and Concerns:   Client was [facility] to [facility] by ANAIS Serrano due to suicidal ideation and significant  depressive symptoms. The session at [facility] was for a one time care session as they  have an individual therapy  session scheduled for 11/2/2021 with an outpatient provider.    Client has had ongoing depression and anxiety which has affected her functioning with work, personal hygiene, and emotional health. They have tried numerous treatments, however, is worried that none have been helpful so far.      Treatment Objective(s) Addressed in This Session:   Discuss motivation / ambivalence about a referral to specialty mental health services (Therapy, Day Tx, PHP)     Intervention:   Solution Focused: Reviewed referrals given at ER and explored pros and cons of each referral for  mental health services.          ASSESSMENT: Current Emotional / Mental Status (status of significant symptoms):   Risk status (Self / Other harm or suicidal ideation)   Patient denies current fears or concerns for personal safety.   Patient denies current or recent suicidal ideation or behaviors.   Patient denies current or recent homicidal ideation or behaviors.   Patient denies current or recent self injurious behavior or ideation.   Patient denies other safety concerns.   Patient reports there has been no change in risk factors since their last session.     Patient reports there has been no change in protective factors since their last session.     Recommended that patient call 911 or go to the local ED should there be a change in any of these risk factors.     Appearance:   Appropriate    Eye Contact:   Good    Psychomotor Behavior: Normal    Attitude:   Cooperative  Guarded    Orientation:   All   Speech    Rate / Production: Normal/ Responsive Normal     Volume:  Soft    Mood:    Anxious  Normal   Affect:    Appropriate    Thought Content:  Clear     Thought Form:  Blocking  Coherent  Logical    Insight:    Good      Medication Review:   No changes to current psychiatric medication(s)     Medication Compliance:   Yes     Changes in Health Issues:   None reported     Chemical Use Review:   Substance Use: Chemical use reviewed, no active concerns  identified      Tobacco Use: No current tobacco use.      Diagnosis:  1. 33.1 Moderate episode of recurrent major depressive disorder (H)        Collateral Reports Completed:   Not Applicable    PLAN: (Patient Tasks / Therapist Tasks / Other)  Client is scheduled to with their individual therapist on 11/2 and will continue with IOP. Client to also schedule initial appointment with [facility] services through [facility].         Myra Nguyen LP          Answers for HPI/ROS submitted by the patient on 10/29/2021  If you checked off any problems, how difficult have these problems made it for you to do your work, take care of things at home, or get along with other people?: Extremely difficult  PHQ9 TOTAL SCORE: 25  BRISA 7 TOTAL SCORE: 17

## 2021-10-30 ASSESSMENT — PATIENT HEALTH QUESTIONNAIRE - PHQ9: SUM OF ALL RESPONSES TO PHQ QUESTIONS 1-9: 25

## 2021-10-30 ASSESSMENT — ANXIETY QUESTIONNAIRES: GAD7 TOTAL SCORE: 17

## 2021-11-01 ENCOUNTER — HOSPITAL ENCOUNTER (OUTPATIENT)
Dept: BEHAVIORAL HEALTH | Facility: CLINIC | Age: 26
End: 2021-11-01
Attending: PSYCHIATRY & NEUROLOGY
Payer: COMMERCIAL

## 2021-11-01 PROCEDURE — 90853 GROUP PSYCHOTHERAPY: CPT | Mod: 95 | Performed by: COUNSELOR

## 2021-11-01 PROCEDURE — G0177 OPPS/PHP; TRAIN & EDUC SERV: HCPCS | Mod: 95

## 2021-11-01 NOTE — GROUP NOTE
"Process Group Note    PATIENT'S NAME: Nimisha Jasso  MRN:   [id number]  :   1995  ACCT. NUMBER: [id number]  DATE OF SERVICE: 21  START TIME:  1:00 PM  END TIME:  1:50 PM  FACILITATOR: Tyra Ricci [facility]  TOPIC:  Process Group    Diagnoses:  296.32 (F33.1) Major Depressive Disorder, Recurrent Episode, Moderate  300.02 (F41.1) Generalized Anxiety Disorder      [facility] Mental [facility] Day Treatment  TRACK: 1B    NUMBER OF PARTICIPANTS: 5                                      Service Modality:  Video Visit     Telemedicine Visit: The patient's condition can be safely assessed and treated via synchronous audio and visual telemedicine encounter.      Reason for Telemedicine Visit: Services only offered telehealth    Originating Site (Patient Location): Patient's home    Distant Site (Provider Location): Provider Remote Setting- Home Office    Consent:  The patient/guardian has verbally consented to: the potential risks and benefits of telemedicine (video visit) versus in person care; bill my insurance or make self-payment for services provided; and responsibility for payment of non-covered services.     Patient would like the video invitation sent by:  My Chart    Mode of Communication:  Video Conference via Medical Zoom    As the provider I attest to compliance with applicable laws and regulations related to telemedicine.                Data:    Session content: At the start of this group, patients were invited to check in by identifying themselves, describing their current emotional status, and identifying issues to address in this group.   Area(s) of treatment focus addressed in this session included Symptom Management and Personal Safety.    Keith reported feeling \"rough\" today.  Their goal for the day is to get some things put away around the house. Patient declined additional process time but contributed to group discussion and provided feedback and support to " peers.      Therapeutic Interventions/Treatment Strategies:  Psychotherapist offered support, feedback and validation and reinforced use of skills.    Assessment:    Patient response:   Patient responded to session by accepting feedback, giving feedback and listening    Possible barriers to participation / learning include: and no barriers identified    Health Issues:   None reported       Substance Use Review:   Substance Use: No active concerns identified.    Mental Status/Behavioral Observations  Appearance:   Appropriate   Eye Contact:   Good   Psychomotor Behavior: Normal   Attitude:   Cooperative   Orientation:   All  Speech   Rate / Production: Normal    Volume:  Normal   Mood:    Anxious  Depressed   Affect:    Appropriate   Thought Content:   Safety reports  presence of suicidal ideation passive suicidal ideation   Thought Form:  Coherent  Logical     Insight:    Good     Plan:     Safety Plan: Committed to safety and agreed to follow previously developed safety coping plan.      Barriers to treatment: None identified    Patient Contracts (see media tab):  None    Substance Use: Not addressed in session     Continue or Discharge: Patient will continue in Adult Day Treatment (ADT)  as planned. Patient is likely to benefit from learning and using skills as they work toward the goals identified in their treatment plan.      Tyra Ricci, [facility]  November 1, 2021

## 2021-11-01 NOTE — GROUP NOTE
Psychotherapy Group Note    PATIENT'S NAME: Nimisha Jasso  MRN:   [id number]  :   1995  ACCT. NUMBER: [id number]  DATE OF SERVICE: 21  START TIME:  3:00 PM  END TIME:  3:50 PM  FACILITATOR: Tyra Ricci LPCC  TOPIC: MH EBP Group: Specialty Awareness  [facility] Adult Mental Health Day Treatment  TRACK: 1B    NUMBER OF PARTICIPANTS: 5                                      Service Modality:  Video Visit     Telemedicine Visit: The patient's condition can be safely assessed and treated via synchronous audio and visual telemedicine encounter.      Reason for Telemedicine Visit: Services only offered telehealth    Originating Site (Patient Location): Patient's home    Distant Site (Provider Location): Provider Remote Setting- Home Office    Consent:  The patient/guardian has verbally consented to: the potential risks and benefits of telemedicine (video visit) versus in person care; bill my insurance or make self-payment for services provided; and responsibility for payment of non-covered services.     Patient would like the video invitation sent by:  My Chart    Mode of Communication:  Video Conference via Medical Zoom    As the provider I attest to compliance with applicable laws and regulations related to telemedicine.        Summary of Group / Topics Discussed:  Specialty Topics: Grief/Transitions: Patients received an overview of the grief process.  Patients explored their relationship to loss and how that has affected their mental health symptoms.  Strategies for recognizing loss and ideas for engaging in the grief process was presented and discussed.  Patients identified needs for support and coping skills to manage loss.  The purpose of this specialty topic is to help patients identify the aspects of change due to loss that individuals experience in addition to mental health symptoms to better cope with the grief, loss, and life transitions.      Patient Session Goals /  Objectives:    Identified grief, loss, and life transitions     Discussed how grief impacts mental health symptoms and disrupts usual functioning    Identified needs for support and coping with grief and planned further action for coping        Patient Participation / Response:  Fully participated with the group by sharing personal reflections / insights and openly received / provided feedback with other participants.    Demonstrated understanding of topics discussed through group discussion and participation, Identified / Expressed readiness to act on skill suggestions discussed in topic and Verbalized understanding of ways to proactively manage illness    Treatment Plan:  Patient has a current master individualized treatment plan.  See Epic treatment plan for more information.    Tyra Ricci, [facility]

## 2021-11-01 NOTE — GROUP NOTE
Psychoeducation Group Note    PATIENT'S NAME: Nimisha Jasso  MRN:   [id number]  :   1995  ACCT. NUMBER: [id number]  DATE OF SERVICE: 10/28/21  START TIME:  3:00 PM  END TIME:  3:50 PM  FACILITATOR: Malgorzata Fox RN  TOPIC: VANESSA RN Group: Mental Health Maintenance  [facility] Adult Mental Health Day Treatment  TRACK: 1B    NUMBER OF PARTICIPANTS: 3    Summary of Group / Topics Discussed:  Mental Health Maintenance:  Assessments of Strengths: Patients completed a self-reflection on personal strengths worksheet. The concept of personal strength as it relates to resilience were explored. Patients shared responses with the group and participated in discussion.     Patient Session Goals / Objectives:  ? Patients identified 1-3 qualities they consider a personal strength.  ? Patients understood the concept of personal strengths and the connection it has to resiliency                                    Service Modality:  Video Visit     Telemedicine Visit: The patient's condition can be safely assessed and treated via synchronous audio and visual telemedicine encounter.      Reason for Telemedicine Visit: Services only offered telehealth    Originating Site (Patient Location): Patient's home    Distant Site (Provider Location): Provider Remote Setting- Home Office    Consent:  The patient/guardian has verbally consented to: the potential risks and benefits of telemedicine (video visit) versus in person care; bill my insurance or make self-payment for services provided; and responsibility for payment of non-covered services.     Patient would like the video invitation sent by:  My Chart    Mode of Communication:  Video Conference via Medical Zoom    As the provider I attest to compliance with applicable laws and regulations related to telemedicine.           Patient Participation / Response:  Fully participated with the group by sharing personal reflections / insights and openly received / provided  feedback with other participants.    Identified / Expressed personal readiness to practice skills    Treatment Plan:  Patient has a current master individualized treatment plan.  See Epic treatment plan for more information.    Malgorzata Fox RN

## 2021-11-01 NOTE — GROUP NOTE
Psychoeducation Group Note    PATIENT'S NAME: Nimisha Jasso  MRN:   [id number]  :   1995  ACCT. NUMBER: [id number]  DATE OF SERVICE: 21  START TIME:  2:00 PM  END TIME:  2:50 PM  FACILITATOR: Nehemiah Freeman OTR/L  TOPIC: MH Life Skills Group: Communication and Social Skills Development                                    Service Modality:  Video Visit     Telemedicine Visit: The patient's condition can be safely assessed and treated via synchronous audio and visual telemedicine encounter.      Reason for Telemedicine Visit: Services only offered telehealth    Originating Site (Patient Location): Patient's home    Distant Site (Provider Location): Provider Remote Setting- Home Office    Consent:  The patient/guardian has verbally consented to: the potential risks and benefits of telemedicine (video visit) versus in person care; bill my insurance or make self-payment for services provided; and responsibility for payment of non-covered services.     Mode of Communication:  Video Conference via Medical Zoom    As the provider I attest to compliance with applicable laws and regulations related to telemedicine.       [facility] Mental Health Day Treatment  TRACK: 1B    NUMBER OF PARTICIPANTS: 5    Summary of Group / Topics Discussed:  Communication and Social Skills Development: Social Supports: Social Risk Taking: Patients explored and evaluated how effective they are in different aspects of social risk taking.  Patients gained awareness of different areas in which they need/want to take risks, possible benefits of taking this risk, and action steps to take.  Patients identified both personal strengths and opportunities for growth in social risk taking to improve overall communication and connection with other people.      Patient Session Goals / Objectives:    Identified strengths and opportunities for growth in social risk taking skills and how these impact their ability to  communicate clearly with other people       Improved awareness of important aspects of social risk taking skills and how this relates to mental health recovery        Established a plan for practice of these skills in their own environments    Practiced and reflected on how to generalize taught skills to their everyday life        Patient Participation / Response:  Moderately participated, sharing some personal reflections / insights and adequately adequately received / provided feedback with other participants.    Patient presentation: Depressed mood. Slowed and sluggish cognitive processing., Demonstrated understanding of content through handout/video/discussion , Verbalized understanding of content and Patient would benefit from additional opportunities to practice the content to be able to generalize it to their everyday life with increased intentionality, consistency, and efficacy in support of their psychiatric recovery    Treatment Plan:  Patient has a current master individualized treatment plan.  See Epic treatment plan for more information.    Nehemiah Freeman, OTR/L

## 2021-11-02 ENCOUNTER — MYC MEDICAL ADVICE (OUTPATIENT)
Dept: FAMILY MEDICINE | Facility: CLINIC | Age: 26
End: 2021-11-02

## 2021-11-02 DIAGNOSIS — F41.1 GENERALIZED ANXIETY DISORDER: Primary | ICD-10-CM

## 2021-11-03 ENCOUNTER — HOSPITAL ENCOUNTER (OUTPATIENT)
Dept: BEHAVIORAL HEALTH | Facility: CLINIC | Age: 26
End: 2021-11-03
Attending: PSYCHIATRY & NEUROLOGY
Payer: COMMERCIAL

## 2021-11-03 PROCEDURE — G0177 OPPS/PHP; TRAIN & EDUC SERV: HCPCS | Mod: 95

## 2021-11-03 PROCEDURE — 90853 GROUP PSYCHOTHERAPY: CPT | Mod: 95

## 2021-11-03 NOTE — TELEPHONE ENCOUNTER
"Called to check in regarding safety. Reports \"having a hard time w/outpatient program, it makes me spiral really easily, when I sit through sessions it reminds me why I wanted to kill myself, and i'm not making any progress. When we talk about coping skills to help ourselves, and find justine, i'm running out of reasons not to kill myself. Theres a voice in my head that says just try it - kill myself.\"    Does not want to go to [facility] again, this is a motivater to stay safe.    Plan for tonight:  Has a call with a doctor in the next hour  Breathe  Shower  Walk  Hang out with roommate, play a game, watch something  Warmlines for peer support  Crisis if need be  Go to bed early  Use cold pack/ice   Affirmations: I am important. I am worthy. My life is important. I can trust the process. I am healing. It is OK to ask for help.       Will follow up with treatment team to discuss more options for in person programming, or next level of care. In-person programming may be preferable.   "

## 2021-11-03 NOTE — GROUP NOTE
Psychoeducation Group Note    PATIENT'S NAME: Nimisha Jasso  MRN:   [id number]  :   1995  ACCT. NUMBER: [id number]  DATE OF SERVICE: 21  START TIME:  1:00 PM  END TIME:  1:50 PM  FACILITATOR: Nehemiah Freeman OTR/L  TOPIC: MH Life Skills Group: Lifestyle Balance and Structure                                    Service Modality:  Video Visit     Telemedicine Visit: The patient's condition can be safely assessed and treated via synchronous audio and visual telemedicine encounter.      Reason for Telemedicine Visit: Services only offered telehealth    Originating Site (Patient Location): Patient's home    Distant Site (Provider Location): Provider Remote Setting- Home Office    Consent:  The patient/guardian has verbally consented to: the potential risks and benefits of telemedicine (video visit) versus in person care; bill my insurance or make self-payment for services provided; and responsibility for payment of non-covered services.     Mode of Communication:  Video Conference via Medical Zoom    As the provider I attest to compliance with applicable laws and regulations related to telemedicine.       [facility] Adult Mental Health Day Treatment  TRACK: 1B    NUMBER OF PARTICIPANTS: 7    Summary of Group / Topics Discussed:  Lifestyle Balance and Strucure:  Routines, Habits, Rituals, and Roles(Spiritual Wellness): Patients were assisted to identify meaningful roles that they want to promote and the impact their mental health symptoms have on this.  Patients learned, applied, and generalized skills needed to live and participate in meaningful roles as effectively and independently as possible.  Patients developed awareness of strengths and challenges in fulfilling these roles and worked on integrating specific and individualized rituals and habits into their daily life.     Patient Session Goals / Objectives:    Improved awareness and engagement in life s meaningful roles, routines, habits,  and rituals    Explored and identified current roles and challenges due to mental health symptoms     Identified ways to establish and integrate daily self care and wellness routines and habits to support mental health recovery    Practiced and reflected on how to generalize taught skills to their everyday life      Patient Participation / Response:  Fully participated with the group by sharing personal reflections / insights and openly received / provided feedback with other participants.    Demonstrated understanding of content through video/handout/discussion  , Verbalized understanding of content and Patient would benefit from additional opportunities to practice the content to be able to generalize it to their everyday life with increased intentionality, consistency, and efficacy in support of their psychiatric recovery    Treatment Plan:  Patient has a current master individualized treatment plan.  See Epic treatment plan for more information.    Nehemiah Freeman, OTR/L

## 2021-11-03 NOTE — GROUP NOTE
Process Group Note    PATIENT'S NAME: Nimisha Jasso  MRN:   [id number]  :   1995  ACCT. NUMBER: [id number]  DATE OF SERVICE: 21  START TIME:  2:00 PM  END TIME:  2:50 PM  FACILITATOR: Fatoumata Pagan LMFT  TOPIC:  Process Group    Diagnoses:  Major Depressive Disorder, Recurrent Episode, Moderate   Generalized Anxiety Disorder.    [facility] Adult Partial Hospitalization Program  TRACK: 1B    NUMBER OF PARTICIPANTS: 7                                      Service Modality:  Video Visit     Telemedicine Visit: The patient's condition can be safely assessed and treated via synchronous audio and visual telemedicine encounter.      Reason for Telemedicine Visit: Services only offered telehealth    Originating Site (Patient Location): Patient's home    Distant Site (Provider Location): Provider Remote Setting- Home Office    Consent:  The patient/guardian has verbally consented to: the potential risks and benefits of telemedicine (video visit) versus in person care; bill my insurance or make self-payment for services provided; and responsibility for payment of non-covered services.     Patient would like the video invitation sent by:  My Chart    Mode of Communication:  Video Conference via Medical Zoom    As the provider I attest to compliance with applicable laws and regulations related to telemedicine.            Data:    Session content: At the start of this group, patients were invited to check in by identifying themselves, describing their current emotional status, and identifying issues to address in this group.   Area(s) of treatment focus addressed in this session included Symptom Management, Personal Safety and Community Resources/Discharge Planning.    Keith reported feeling  feeling like I m spiraling quickly  today. Made a plan for roommate to go on a drive together to distract and maintain safety.      Therapeutic Interventions/Treatment Strategies:  Psychotherapist offered  support, feedback and validation and reinforced use of skills. Treatment modalities used include Motivational Interviewing, Cognitive Behavioral Therapy and Dialectical Behavioral Therapy. Interventions include Coping Skills: Assisted patient in identifying 1-2 healthy distraction skills to reduce overall distress.    Assessment:    Patient response:   Patient responded to session by listening and accepting support    Possible barriers to participation / learning include: severity of symptoms and and no barriers identified    Health Issues:   None reported       Substance Use Review:   Substance Use: No active concerns identified.    Mental Status/Behavioral Observations  Appearance:   Appropriate   Eye Contact:   Fair   Psychomotor Behavior: Normal   Attitude:   Cooperative   Orientation:   All  Speech   Rate / Production: Emotional Normal    Volume:  Normal   Mood:    Anxious  Depressed  Sad   Affect:    Appropriate  Tearful Worrisome   Thought Content:   Clear and Safety reports  presence of suicidal ideation passive suicidal ideation   Thought Form:  Coherent  Logical     Insight:    Good  and Fair     Plan:     Safety Plan: Collaborative care team was informed of patient's risk status and plan.    Committed to safety and agreed to follow previously developed safety coping plan.     No current safety concerns identified.  Recommended that patient call 911 or go to the local ED should there be a change in any of these risk factors.     Barriers to treatment: None identified    Patient Contracts (see media tab):  None    Substance Use: Not addressed in session     Continue or Discharge: Patient will continue in Adult Day Treatment (ADT)  as planned. Patient is likely to benefit from learning and using skills as they work toward the goals identified in their treatment plan.      ALTON Godoy  November 3, 2021

## 2021-11-04 ENCOUNTER — MYC MEDICAL ADVICE (OUTPATIENT)
Dept: FAMILY MEDICINE | Facility: CLINIC | Age: 26
End: 2021-11-04

## 2021-11-04 ENCOUNTER — HOSPITAL ENCOUNTER (OUTPATIENT)
Dept: BEHAVIORAL HEALTH | Facility: CLINIC | Age: 26
End: 2021-11-04
Attending: PSYCHIATRY & NEUROLOGY
Payer: COMMERCIAL

## 2021-11-04 DIAGNOSIS — F33.1 MODERATE EPISODE OF RECURRENT MAJOR DEPRESSIVE DISORDER (H): ICD-10-CM

## 2021-11-04 PROCEDURE — 90853 GROUP PSYCHOTHERAPY: CPT | Mod: 95 | Performed by: COUNSELOR

## 2021-11-04 PROCEDURE — G0177 OPPS/PHP; TRAIN & EDUC SERV: HCPCS | Mod: 95

## 2021-11-04 RX ORDER — TRAZODONE HYDROCHLORIDE 50 MG/1
50 TABLET, FILM COATED ORAL AT BEDTIME
Qty: 90 TABLET | Refills: 3 | Status: SHIPPED | OUTPATIENT
Start: 2021-11-04 | End: 2022-08-05

## 2021-11-04 NOTE — PROGRESS NOTES
Acknowledgement of Current Treatment Plan       I have reviewed my treatment plan(60 Day Review) with my therapist on 11/4/21.   I agree with the plan as it is written in the electronic health record. (1B)    Name:      Signature:  Nimisha GROSS Romero Unable to sign due to COVID and Virtual     Yuval Ridley MD  Psychiatrist/Medical Director Yuval Ridley MD on 11/11/2021 at 4:36 PM   MARIA C Castro, [facility] [place]  Psychotherapist MARIA C Aguirre on 11/11/2021 at 3:20 PM     Jose Freeman OTR/L Jose Freeman OTR/L

## 2021-11-04 NOTE — PROGRESS NOTES
"Acknowledgement of Current Treatment Plan       I have reviewed my treatment plan(60 Day Review) with my therapist on 11/4/21.   I agree with the plan as it is written in the electronic health record. (1B)    Name:      Signature:  Nimisha \"Keith\" RENATO Jasso Unable to sign due to COVID and Virtual     Yuval Ridley MD  Psychiatrist/Medical Director    Tyra Ricci, [facility], [facility]  Psychotherapist    Jose GARNICA/RENATO      " none

## 2021-11-04 NOTE — GROUP NOTE
Psychotherapy Group Note    PATIENT'S NAME: Nimisha Jasso  MRN:   [id number]  :   1995  ACCT. NUMBER: [id number]  DATE OF SERVICE: 21  START TIME:  2:00 PM  END TIME:  2:50 PM  FACILITATOR: Tyra Ricci LPCC  TOPIC: MH EBP Group: Emotions Management  [facility] Mental Health Day Treatment  TRACK: 1B    NUMBER OF PARTICIPANTS: 8                                      Service Modality:  Video Visit     Telemedicine Visit: The patient's condition can be safely assessed and treated via synchronous audio and visual telemedicine encounter.      Reason for Telemedicine Visit: Services only offered telehealth    Originating Site (Patient Location): Patient's home    Distant Site (Provider Location): Provider Remote Setting- Home Office    Consent:  The patient/guardian has verbally consented to: the potential risks and benefits of telemedicine (video visit) versus in person care; bill my insurance or make self-payment for services provided; and responsibility for payment of non-covered services.     Patient would like the video invitation sent by:  My Chart    Mode of Communication:  Video Conference via Medical Zoom    As the provider I attest to compliance with applicable laws and regulations related to telemedicine.        Summary of Group / Topics Discussed:  Emotions Management: Opposite to Emotion: Patients discussed past and present struggles with knowing how to make changes in their lives due to difficult emotional experiences.  Explored desires to experience and feel less anger, sadness, guilt, and fear.  Reviewed the therapeutic skill of opposite action and patients explored opportunities to use their behaviors as a tool to reduce an emotion that they want to change.     Patient Session Goals / Objectives:    Review DBT concepts and focus on patient s experiences of distress and difficult emotional experiences.    Learn how to do the opposite of what an emotion makes us want to do  in an effort to decrease an unwanted emotional experience.    Demonstrate understanding of the skill of opposite action by sharing experiences where the technique could be useful in past / present situations.      Patient Participation / Response:  Fully participated with the group by sharing personal reflections / insights and openly received / provided feedback with other participants.    Demonstrated understanding of topics discussed through group discussion and participation, Expressed understanding of the relevance / importance of emotions management skills at distressing times in life, Self-aware of experiences with difficult emotions, and strategies to employ to manage them and Demonstrated knowledge of when to consider applying a variety of emotions management skills in daily life    Treatment Plan:  Patient has a current master individualized treatment plan.  See Epic treatment plan for more information.    Tyra Ricci, [facility]

## 2021-11-04 NOTE — GROUP NOTE
Process Group Note    PATIENT'S NAME: Nimisha Jasso  MRN:   [id number]  :   1995  ACCT. NUMBER: [id number]  DATE OF SERVICE: 21  START TIME:  1:00 PM  END TIME:  1:50 PM  FACILITATOR: Tyra Ricci [facility]  TOPIC:  Process Group    Diagnoses:  296.32 (F33.1) Major Depressive Disorder, Recurrent Episode, Moderate  300.02 (F41.1) Generalized Anxiety Disorder      [facility] Day Treatment  TRACK: 1B    NUMBER OF PARTICIPANTS: 4                                      Service Modality:  Video Visit     Telemedicine Visit: The patient's condition can be safely assessed and treated via synchronous audio and visual telemedicine encounter.      Reason for Telemedicine Visit: Services only offered telehealth    Originating Site (Patient Location): Patient's home    Distant Site (Provider Location): Provider Remote Setting- Home Office    Consent:  The patient/guardian has verbally consented to: the potential risks and benefits of telemedicine (video visit) versus in person care; bill my insurance or make self-payment for services provided; and responsibility for payment of non-covered services.     Patient would like the video invitation sent by:  My Chart    Mode of Communication:  Video Conference via Medical Zoom    As the provider I attest to compliance with applicable laws and regulations related to telemedicine.                Data:    Session content: At the start of this group, patients were invited to check in by identifying themselves, describing their current emotional status, and identifying issues to address in this group.   Area(s) of treatment focus addressed in this session included Symptom Management and Personal Safety.    Keith reported feeling a little better today but still very depressed and hopeless.  Their goal for the day is to keep the positive attitude going.  They reported having some strong suicidal ideation last night but was able to not act on  anything.      Therapeutic Interventions/Treatment Strategies:  Psychotherapist offered support, feedback and validation and reinforced use of skills.    Assessment:    Patient response:   Patient responded to session by accepting feedback, giving feedback and listening    Possible barriers to participation / learning include: and no barriers identified    Health Issues:   None reported       Substance Use Review:   Substance Use: Substance use has decreased    Mental Status/Behavioral Observations  Appearance:   Appropriate   Eye Contact:   Good   Psychomotor Behavior: Normal   Attitude:   Cooperative   Orientation:   All  Speech   Rate / Production: Normal    Volume:  Normal   Mood:    Anxious  Depressed   Affect:    Appropriate   Thought Content:   Safety reports  presence of suicidal ideation active suicidal ideation  and passive suicidal ideation   Thought Form:  Coherent  Logical     Insight:    Good     Plan:     Safety Plan: Committed to safety and agreed to follow previously developed safety coping plan.      Barriers to treatment: None identified    Patient Contracts (see media tab):  None    Substance Use: Not addressed in session     Continue or Discharge: Patient will continue in Adult Day Treatment (ADT)  as planned. Patient is likely to benefit from learning and using skills as they work toward the goals identified in their treatment plan.      Trya Ricci, [facility]  November 4, 2021

## 2021-11-04 NOTE — GROUP NOTE
Psychoeducation Group Note    PATIENT'S NAME: Nimisha Jasso  MRN:   [id number]  :   1995  ACCT. NUMBER: [id number]  DATE OF SERVICE: 21  START TIME:  3:00 PM  END TIME:  3:50 PM  FACILITATOR: Nehemiah Freeman OTR/L  TOPIC: MH Life Skills Group: Lifestyle Balance and Structure                                    Service Modality:  Video Visit     Telemedicine Visit: The patient's condition can be safely assessed and treated via synchronous audio and visual telemedicine encounter.      Reason for Telemedicine Visit: Services only offered telehealth    Originating Site (Patient Location): Patient's home    Distant Site (Provider Location): Provider Remote Setting- Home Office    Consent:  The patient/guardian has verbally consented to: the potential risks and benefits of telemedicine (video visit) versus in person care; bill my insurance or make self-payment for services provided; and responsibility for payment of non-covered services.     Mode of Communication:  Video Conference via Medical Zoom    As the provider I attest to compliance with applicable laws and regulations related to telemedicine.       [facility] Adult Mental Health Day Treatment  TRACK: 1B    NUMBER OF PARTICIPANTS: 5    Summary of Group / Topics Discussed:  Lifestyle Balance and Strucure:  Routines, Habits, Rituals, and Roles(Weekly Mental Health Check In/ How the Food You Eat Affects Your Brain): Patients were assisted to identify meaningful roles that they want to promote and the impact their mental health symptoms have on this.  Patients learned, applied, and generalized skills needed to live and participate in meaningful roles as effectively and independently as possible.  Patients developed awareness of strengths and challenges in fulfilling these roles and worked on integrating specific and individualized rituals and habits into their daily life.     Patient Session Goals / Objectives:    Improved awareness and  engagement in life s meaningful roles, routines, habits, and rituals    Explored and identified current roles and challenges due to mental health symptoms     Identified ways to establish and integrate daily self care and wellness routines and habits to support mental health recovery    Practiced and reflected on how to generalize taught skills to their everyday life      Patient Participation / Response:  Moderately participated, sharing some personal reflections / insights and adequately adequately received / provided feedback with other participants.    Patient presentation: Sad mood,tearful mood, Eepressed frustration with lack of progress with mental health recovery. They feel they have become worse while in the program. Considering TMS and DBT., Demonstrated understanding of content through handout/video/discussion , Verbalized understanding of content and Patient would benefit from additional opportunities to practice the content to be able to generalize it to their everyday life with increased intentionality, consistency, and efficacy in support of their psychiatric recovery    Treatment Plan:  Patient has a current master individualized treatment plan.  See Epic treatment plan for more information.    Nehemiah Freeman, OTR/L

## 2021-11-05 ENCOUNTER — TELEPHONE (OUTPATIENT)
Dept: BEHAVIORAL HEALTH | Facility: CLINIC | Age: 26
End: 2021-11-05

## 2021-11-05 NOTE — TELEPHONE ENCOUNTER
Writer spoke with Keith to follow up on safety concerns they had yesterday.  Keith was very tearful and statated that their roommates talked to them last night and asked that they go into an inpatient program.  Keith reported feeling confused and scared about what to do.  Writer provided Keith with some education on on different inpatient/residential options and how to access those services.  They also are interested in TMS and was given the number to call to start that process.  They reported feeling fairly safe right now and committed to following their safety plan.     Tyra Ricci, [facility] on 11/5/2021 at 1:08 PM

## 2021-11-08 ENCOUNTER — HOSPITAL ENCOUNTER (OUTPATIENT)
Dept: BEHAVIORAL HEALTH | Facility: CLINIC | Age: 26
End: 2021-11-08
Attending: PSYCHIATRY & NEUROLOGY
Payer: COMMERCIAL

## 2021-11-08 PROCEDURE — G0177 OPPS/PHP; TRAIN & EDUC SERV: HCPCS | Mod: 95

## 2021-11-08 PROCEDURE — 90853 GROUP PSYCHOTHERAPY: CPT | Mod: GT | Performed by: COUNSELOR

## 2021-11-08 NOTE — GROUP NOTE
Psychotherapy Group Note    PATIENT'S NAME: Nimisha Jasso  MRN:   [id number]  :   1995  ACCT. NUMBER: [id number]  DATE OF SERVICE: 21  START TIME:  3:00 PM  END TIME:  3:50 PM  FACILITATOR: Tyra Ricci LPCC  TOPIC:  EBP Group: [facility] Adult Mental Health Day Treatment  TRACK: 1B    NUMBER OF PARTICIPANTS: 4                                      Service Modality:  Video Visit     Telemedicine Visit: The patient's condition can be safely assessed and treated via synchronous audio and visual telemedicine encounter.      Reason for Telemedicine Visit: Services only offered telehealth    Originating Site (Patient Location): Patient's home    Distant Site (Provider Location): Provider Remote Setting- Home Office    Consent:  The patient/guardian has verbally consented to: the potential risks and benefits of telemedicine (video visit) versus in person care; bill my insurance or make self-payment for services provided; and responsibility for payment of non-covered services.     Patient would like the video invitation sent by:  My Chart    Mode of Communication:  Video Conference via Medical Zoom    As the provider I attest to compliance with applicable laws and regulations related to telemedicine.          Summary of Group / Topics Discussed:  Mindfulness: Mindfulness Experiential: Patients received an overview on what mindfulness is and how mindfulness can benefit general health, mental health symptoms, and stressors. The history of mindfulness, its application to mental health therapies, and key concepts were also discussed. Patients discussed current awareness, knowledge, and practice of mindfulness skills. Patients also discussed barriers to mindfulness practice.    Patient Session Goals / Objectives:    Demonstrated and verbalized understanding of key mindfulness concepts    Identified when/how to use mindfulness skills    Resolved barriers to practicing mindfulness  skills    Identified plan to use mindfulness skills in daily life       Patient Participation / Response:  Fully participated with the group by sharing personal reflections / insights and openly received / provided feedback with other participants.    Demonstrated understanding of topics discussed through group discussion and participation, Demonstrated understanding of mindfulness skills and benefits of practice and Identified / Expressed personal readiness to practice mindfulness skills    Treatment Plan:  Patient has a current master individualized treatment plan.  See Epic treatment plan for more information.    Tyra Ricci, [facility]

## 2021-11-08 NOTE — GROUP NOTE
Psychoeducation Group Note    PATIENT'S NAME: Nimisha Jasso  MRN:   [id number]  :   1995  ACCT. NUMBER: [id number]  DATE OF SERVICE: 21  START TIME:  2:00 PM  END TIME:  2:50 PM  FACILITATOR: Nehemiah Freeman OTR/L  TOPIC: MH Life Skills Group: Communication and Social Skills Development                                    Service Modality:  Video Visit     Telemedicine Visit: The patient's condition can be safely assessed and treated via synchronous audio and visual telemedicine encounter.      Reason for Telemedicine Visit: Services only offered telehealth    Originating Site (Patient Location): Patient's home    Distant Site (Provider Location): Provider Remote Setting- Home Office    Consent:  The patient/guardian has verbally consented to: the potential risks and benefits of telemedicine (video visit) versus in person care; bill my insurance or make self-payment for services provided; and responsibility for payment of non-covered services.     Mode of Communication:  Video Conference via Medical Zoom    As the provider I attest to compliance with applicable laws and regulations related to telemedicine.       [facility] Mental Health Day Treatment  TRACK: 1B    NUMBER OF PARTICIPANTS: 5    Summary of Group / Topics Discussed:  Communication and Social Skills Development: Communication Styles: Am I Assertive?: Patients completed a self assessment of assertiveness skills and how this impacts their communication with other people.  Patients were given an opportunity to identify strengths as well as areas for improvement in assertive communication.    Patient Session Goals / Objectives:    Identified strengths and areas for improvement in assertive communication and how that impacts their ability to communicate clearly with other people       Improved awareness of important aspects of assertive communication and how this relates to mental health recovery        Established a plan for  practice of these skills in their own environments    Practiced and reflected on how to generalize taught skills to their everyday life      Patient Participation / Response:  Fully participated with the group by sharing personal reflections / insights and openly received / provided feedback with other participants.    Demonstrated understanding of content through handouts/group discussion , Verbalized understanding of content and Patient would benefit from additional opportunities to practice the content to be able to generalize it to their everyday life with increased intentionality, consistency, and efficacy in support of their psychiatric recovery    Treatment Plan:  Patient has a current master individualized treatment plan.  See Epic treatment plan for more information.    Nehemiah Freeman, OTR/L

## 2021-11-08 NOTE — GROUP NOTE
"Process Group Note    PATIENT'S NAME: Nimisha Jasso  MRN:   [id number]  :   1995  ACCT. NUMBER: [id number]  DATE OF SERVICE: 21  START TIME:  1:00 PM  END TIME:  1:50 PM  FACILITATOR: Tyra Ricci [facility]  TOPIC:  Process Group    Diagnoses:  296.32 (F33.1) Major Depressive Disorder, Recurrent Episode, Moderate  300.02 (F41.1) Generalized Anxiety Disorder      [facility] Mental [facility] Day Treatment  TRACK: 1B    NUMBER OF PARTICIPANTS: 5                                      Service Modality:  Video Visit     Telemedicine Visit: The patient's condition can be safely assessed and treated via synchronous audio and visual telemedicine encounter.      Reason for Telemedicine Visit: Services only offered telehealth    Originating Site (Patient Location): Patient's home    Distant Site (Provider Location): Provider Remote Setting- Home Office    Consent:  The patient/guardian has verbally consented to: the potential risks and benefits of telemedicine (video visit) versus in person care; bill my insurance or make self-payment for services provided; and responsibility for payment of non-covered services.     Patient would like the video invitation sent by:  My Chart    Mode of Communication:  Video Conference via Medical Zoom    As the provider I attest to compliance with applicable laws and regulations related to telemedicine.                Data:    Session content: At the start of this group, patients were invited to check in by identifying themselves, describing their current emotional status, and identifying issues to address in this group.   Area(s) of treatment focus addressed in this session included Symptom Management and Personal Safety.    Keith reported feeling \"really good\" today because they played D&D yesterday and feel \"rejuvinated\".  Their goal today is to apply to 2 jobs they have been avoiding. Patient declined additional process time but contributed to group discussion " and provided feedback and support to peers.      Therapeutic Interventions/Treatment Strategies:  Psychotherapist offered support, feedback and validation and reinforced use of skills.    Assessment:    Patient response:   Patient responded to session by accepting feedback, giving feedback and listening    Possible barriers to participation / learning include: and no barriers identified    Health Issues:   None reported       Substance Use Review:   Substance Use: No active concerns identified.    Mental Status/Behavioral Observations  Appearance:   Appropriate   Eye Contact:   Good   Psychomotor Behavior: Normal   Attitude:   Cooperative   Orientation:   All  Speech   Rate / Production: Normal    Volume:  Normal   Mood:    Elevated   Affect:    Appropriate   Thought Content:   Clear  Thought Form:  Coherent  Logical     Insight:    Good     Plan:     Safety Plan: No current safety concerns identified.  Recommended that patient call 911 or go to the local ED should there be a change in any of these risk factors.     Barriers to treatment: None identified    Patient Contracts (see media tab):  None    Substance Use: Not addressed in session     Continue or Discharge: Patient will continue in Adult Day Treatment (ADT)  as planned. Patient is likely to benefit from learning and using skills as they work toward the goals identified in their treatment plan.      Tyra Ricci, [facility]  November 8, 2021

## 2021-11-10 ENCOUNTER — HOSPITAL ENCOUNTER (OUTPATIENT)
Dept: BEHAVIORAL HEALTH | Facility: CLINIC | Age: 26
End: 2021-11-10
Attending: PSYCHIATRY & NEUROLOGY
Payer: COMMERCIAL

## 2021-11-10 PROCEDURE — 90853 GROUP PSYCHOTHERAPY: CPT | Mod: GT | Performed by: COUNSELOR

## 2021-11-10 PROCEDURE — G0177 OPPS/PHP; TRAIN & EDUC SERV: HCPCS | Mod: GT

## 2021-11-10 NOTE — GROUP NOTE
Psychoeducation Group Note    PATIENT'S NAME: Nimisha Jasso  MRN:   [id number]  :   1995  ACCT. NUMBER: [id number]  DATE OF SERVICE: 11/10/21  START TIME:  3:00 PM  END TIME:  3:50 PM  FACILITATOR: Daniel Sanchez RN  TOPIC: VANESSA RN Group: [facility]                                    Service Modality:  Video Visit     Telemedicine Visit: The patient's condition can be safely assessed and treated via synchronous audio and visual telemedicine encounter.      Reason for Telemedicine Visit: Covid19    Originating Site (Patient Location): Patient's home    Distant Site (Provider Location): Provider Remote Setting- Home Office    Consent:  The patient/guardian has verbally consented to: the potential risks and benefits of telemedicine (video visit) versus in person care; bill my insurance or make self-payment for services provided; and responsibility for payment of non-covered services.     Patient would like the video invitation sent by:  My Chart    Mode of Communication:  Video Conference via Medical Zoom    As the provider I attest to compliance with applicable laws and regulations related to telemedicine.        [facility] Adult Mental Health Day Treatment  TRACK: 1b    NUMBER OF PARTICIPANTS: 6    Summary of Group / Topics Discussed:  Foundations of Health: Sleep: Pathophysiology of sleep disorders: The anatomy of sleep was reviewed including structures, stages, mechanisms, and the purpose that sleep has on the brain. Sleep disorders were discussed within the group with a focus on gaining knowledge about the etiology and pathophysiology of insomnia, sleep apnea, restless leg syndrome, narcolepsy, medications that can cause risk for sleeping disorders, and other symptoms/factors that may interfere with sleep. Risk factors for developing sleep disorders were discussed and treatments/pharmacological options were explored.     Patient Session Goals / Objectives:  ? Described the effect and  purpose of sleep on the brain and identify the amount of sleep needed  ? Identified differences between sleep disorders and risks associated with sleep disorders  ? Described the connection between sleep disturbances and mental illness    Increased knowledge about treatments for sleep disorders      Patient Participation / Response:  Fully participated with the group by sharing personal reflections / insights and openly received / provided feedback with other participants.    Demonstrated understanding of topics discussed through group discussion and participation    Treatment Plan:  Patient has a current master individualized treatment plan.  See Epic treatment plan for more information.    Daniel Sanchez RN

## 2021-11-10 NOTE — GROUP NOTE
Psychoeducation Group Note    PATIENT'S NAME: Nimisha Jasso  MRN:   [id number]  :   1995  ACCT. NUMBER: [id number]  DATE OF SERVICE: 11/10/21  START TIME:  1:00 PM  END TIME:  1:50 PM  FACILITATOR: Nehemiah Freeman OTR/L  TOPIC: MH Life Skills Group: Resiliency Development                                    Service Modality:  Video Visit     Telemedicine Visit: The patient's condition can be safely assessed and treated via synchronous audio and visual telemedicine encounter.      Reason for Telemedicine Visit: Services only offered telehealth    Originating Site (Patient Location): Patient's home    Distant Site (Provider Location): Provider Remote Setting- Home Office    Consent:  The patient/guardian has verbally consented to: the potential risks and benefits of telemedicine (video visit) versus in person care; bill my insurance or make self-payment for services provided; and responsibility for payment of non-covered services.     Mode of Communication:  Video Conference via Medical Zoom    As the provider I attest to compliance with applicable laws and regulations related to telemedicine.       [facility] Adult Mental Health Day Treatment  TRACK: 1B    NUMBER OF PARTICIPANTS: 6    Summary of Group / Topics Discussed:  Resiliency Development:  Coping Skills(Four Questions and Turnarounds): Patients were taught how to identify stressors, signs of stress, coping skills, and prevention strategies for overall stress management.  Patients were given the opportunity to identify both ongoing and acute mental health symptoms and how to effectively manage these symptoms by developing an effective aftercare plan.  Patients increased awareness of community based resources.    Patient Session Goals / Objectives:    Identified how using coping skills can be used for symptom and stress management       Improved awareness of individualed symptoms and stressors and how to effectively cope     Established a  relapse prevention plan to practice these skills in their own environments    Practiced and reflected on how to generalize taught skills to their everyday life        Patient Participation / Response:  Fully participated with the group by sharing personal reflections / insights and openly received / provided feedback with other participants.    Demonstrated understanding of content through video/handout/group discussion , Verbalized understanding of content and Patient would benefit from additional opportunities to practice the content to be able to generalize it to their everyday life with increased intentionality, consistency, and efficacy in support of their psychiatric recovery    Treatment Plan:  Patient has a current master individualized treatment plan.  See Epic treatment plan for more information.    Nehemiah Freeman, OTR/L

## 2021-11-10 NOTE — GROUP NOTE
"Process Group Note    PATIENT'S NAME: Nimisha Jasso  MRN:   [id number]  :   1995  ACCT. NUMBER: [id number]  DATE OF SERVICE: 11/10/21  START TIME:  2:00 PM  END TIME:  2:50 PM  FACILITATOR: Tyra Ricci [facility]  TOPIC:  Process Group    Diagnoses:  296.32 (F33.1) Major Depressive Disorder, Recurrent Episode, Moderate  300.02 (F41.1) Generalized Anxiety Disorder      [facility] Mental [facility] Day Treatment  TRACK: 1B    NUMBER OF PARTICIPANTS: 7                                      Service Modality:  Video Visit     Telemedicine Visit: The patient's condition can be safely assessed and treated via synchronous audio and visual telemedicine encounter.      Reason for Telemedicine Visit: Services only offered telehealth    Originating Site (Patient Location): Patient's home    Distant Site (Provider Location): Provider Remote Setting- Home Office    Consent:  The patient/guardian has verbally consented to: the potential risks and benefits of telemedicine (video visit) versus in person care; bill my insurance or make self-payment for services provided; and responsibility for payment of non-covered services.     Patient would like the video invitation sent by:  My Chart    Mode of Communication:  Video Conference via Medical Zoom    As the provider I attest to compliance with applicable laws and regulations related to telemedicine.                Data:    Session content: At the start of this group, patients were invited to check in by identifying themselves, describing their current emotional status, and identifying issues to address in this group.   Area(s) of treatment focus addressed in this session included Symptom Management and Personal Safety.    Keith reported feeling \"still riding a high\" today.  Her goal is to ask their therapist to read over some emails before they sent them. Patient declined additional process time but contributed to group discussion and provided feedback and " support to peers.      Therapeutic Interventions/Treatment Strategies:  Psychotherapist offered support, feedback and validation and reinforced use of skills.    Assessment:    Patient response:   Patient responded to session by accepting feedback, giving feedback and listening    Possible barriers to participation / learning include: and no barriers identified    Health Issues:   None reported       Substance Use Review:   Substance Use: No active concerns identified.    Mental Status/Behavioral Observations  Appearance:   Appropriate   Eye Contact:   Good   Psychomotor Behavior: Normal   Attitude:   Cooperative   Orientation:   All  Speech   Rate / Production: Normal    Volume:  Normal   Mood:    Elevated   Affect:    Appropriate   Thought Content:   Clear  Thought Form:  Coherent  Logical     Insight:    Good     Plan:     Safety Plan: No current safety concerns identified.  Recommended that patient call 911 or go to the local ED should there be a change in any of these risk factors.     Barriers to treatment: None identified    Patient Contracts (see media tab):  None    Substance Use: Not addressed in session     Continue or Discharge: Patient will continue in Adult Day Treatment (ADT)  as planned. Patient is likely to benefit from learning and using skills as they work toward the goals identified in their treatment plan.      Tyra Ricci, [facility]  November 10, 2021

## 2021-11-11 ENCOUNTER — HOSPITAL ENCOUNTER (OUTPATIENT)
Dept: BEHAVIORAL HEALTH | Facility: CLINIC | Age: 26
End: 2021-11-11
Attending: PSYCHIATRY & NEUROLOGY
Payer: COMMERCIAL

## 2021-11-11 PROCEDURE — G0177 OPPS/PHP; TRAIN & EDUC SERV: HCPCS | Mod: GT

## 2021-11-11 PROCEDURE — 90853 GROUP PSYCHOTHERAPY: CPT | Mod: GT | Performed by: COUNSELOR

## 2021-11-11 PROCEDURE — 90853 GROUP PSYCHOTHERAPY: CPT | Mod: 95 | Performed by: COUNSELOR

## 2021-11-11 NOTE — GROUP NOTE
"Process Group Note    PATIENT'S NAME: Nimisha Jasso  MRN:   [id number]  :   1995  ACCT. NUMBER: [id number]  DATE OF SERVICE: 21  START TIME:  1:00 PM  END TIME:  1:50 PM  FACILITATOR: Tyra Ricci [facility]  TOPIC:  Process Group    Diagnoses:  296.32 (F33.1) Major Depressive Disorder, Recurrent Episode, Moderate  300.02 (F41.1) Generalized Anxiety Disorder      [facility] Mental [facility] Day Treatment  TRACK: 1B    NUMBER OF PARTICIPANTS: 5                                      Service Modality:  Video Visit     Telemedicine Visit: The patient's condition can be safely assessed and treated via synchronous audio and visual telemedicine encounter.      Reason for Telemedicine Visit: Services only offered telehealth    Originating Site (Patient Location): Patient's home    Distant Site (Provider Location): Provider Remote Setting- Home Office    Consent:  The patient/guardian has verbally consented to: the potential risks and benefits of telemedicine (video visit) versus in person care; bill my insurance or make self-payment for services provided; and responsibility for payment of non-covered services.     Patient would like the video invitation sent by:  My Chart    Mode of Communication:  Video Conference via Medical Zoom    As the provider I attest to compliance with applicable laws and regulations related to telemedicine.                Data:    Session content: At the start of this group, patients were invited to check in by identifying themselves, describing their current emotional status, and identifying issues to address in this group.   Area(s) of treatment focus addressed in this session included Symptom Management, Personal Safety and Abstinence/Relapse Prevention.    Keith reported feeling like they are \"coming down from their high\" that they have been on the past few days.  Their goal for the day is to apply for some jobs.  They reported that they have been having a lot of " "trouble eating and sleeping these past few days because they have so much energy.  Keith took process time to share their concern about \"crashing\" because they can feel it starting.      Therapeutic Interventions/Treatment Strategies:  Psychotherapist offered support, feedback and validation and reinforced use of skills. Treatment modalities used include Motivational Interviewing, Cognitive Behavioral Therapy and Dialectical Behavioral Therapy. Interventions include Symptoms Management: Promoted understanding of their diagnoses and how it impacts their functioning.    Assessment:    Patient response:   Patient responded to session by accepting feedback, giving feedback and listening    Possible barriers to participation / learning include: and no barriers identified    Health Issues:   None reported       Substance Use Review:   Substance Use: Substance use has decreased    Mental Status/Behavioral Observations  Appearance:   Appropriate   Eye Contact:   Good   Psychomotor Behavior: Normal   Attitude:   Cooperative   Orientation:   All  Speech   Rate / Production: Normal    Volume:  Normal   Mood:    Elevated   Affect:    Appropriate   Thought Content:   Clear  Thought Form:  Coherent  Logical     Insight:    Good     Plan:     Safety Plan: No current safety concerns identified.  Recommended that patient call 911 or go to the local ED should there be a change in any of these risk factors.     Barriers to treatment: None identified    Patient Contracts (see media tab):  None    Substance Use: Not addressed in session     Continue or Discharge: Patient will continue in Adult Day Treatment (ADT)  as planned. Patient is likely to benefit from learning and using skills as they work toward the goals identified in their treatment plan.      Tyra Ricci, [facility]  November 11, 2021    "

## 2021-11-11 NOTE — GROUP NOTE
Psychoeducation Group Note    PATIENT'S NAME: Nimisha Jasso  MRN:   [id number]  :   1995  ACCT. NUMBER: [id number]  DATE OF SERVICE: 21  START TIME:  2:00 PM  END TIME:  2:50 PM  FACILITATOR: Tahira Lam RN  TOPIC: MH RN Group: Health Maintenance                                    Service Modality:  Video Visit     Telemedicine Visit: The patient's condition can be safely assessed and treated via synchronous audio and visual telemedicine encounter.      Reason for Telemedicine Visit:  covid19    Originating Site (Patient Location): Patient's home    Distant Site (Provider Location): Provider Remote Setting- Home Office    Consent:  The patient/guardian has verbally consented to: the potential risks and benefits of telemedicine (video visit) versus in person care; bill my insurance or make self-payment for services provided; and responsibility for payment of non-covered services.     Patient would like the video invitation sent by:  My Chart    Mode of Communication:  Video Conference via Medical Zoom    As the provider I attest to compliance with applicable laws and regulations related to telemedicine.          [facility] Adult Mental Health Day Treatment  TRACK: 1B    NUMBER OF PARTICIPANTS: 4    Summary of Group / Topics Discussed:  Health Maintenance: Weekend planning: Patients were given time to complete a weekend plan of what they will do to promote wellness and sobriety over the weekend when they do not have the structure of group. Patients were encouraged to review progress on their treatment goals and were challenged to identify ways to work toward meeting them. Patients identified and discussed foreseeable barriers to success over the weekend and then developed a plan to overcome them. Patients reviewed their distress coping skills and social support network and discussed this with the group.       Patient Session Goals / Objectives:    ?    Identified activities to engage in  that promote balance in wellness  ?    Distinguished possible barriers to success over the weekend and created a plan to overcome them  ?    Listed distress coping skills and identified social support network to utilize if in crisis during the weekend        Patient Participation / Response:  Fully participated with the group by sharing personal reflections / insights and openly received / provided feedback with other participants.    Demonstrated understanding of topics discussed through group discussion and participation and Identified / Expressed personal readiness to practice skills    Treatment Plan:  Patient has a current master individualized treatment plan.  See Epic treatment plan for more information.    Tahira Lam RN

## 2021-11-11 NOTE — GROUP NOTE
Psychotherapy Group Note    PATIENT'S NAME: Nimisha Jasso  MRN:   [id number]  :   1995  ACCT. NUMBER: [id number]  DATE OF SERVICE: 21  START TIME:  3:00 PM  END TIME:  3:50 PM  FACILITATOR: Tyra Ricci LPCC  TOPIC: MH EBP Group: Specialty Awareness  [facility] Adult Mental Health Day Treatment  TRACK: 1B    NUMBER OF PARTICIPANTS: 4                                        Service Modality:  Video Visit     Telemedicine Visit: The patient's condition can be safely assessed and treated via synchronous audio and visual telemedicine encounter.      Reason for Telemedicine Visit: Services only offered telehealth    Originating Site (Patient Location): Patient's home    Distant Site (Provider Location): Provider Remote Setting- Home Office    Consent:  The patient/guardian has verbally consented to: the potential risks and benefits of telemedicine (video visit) versus in person care; bill my insurance or make self-payment for services provided; and responsibility for payment of non-covered services.     Patient would like the video invitation sent by:  My Chart    Mode of Communication:  Video Conference via Medical Zoom    As the provider I attest to compliance with applicable laws and regulations related to telemedicine.          Summary of Group / Topics Discussed:  Specialty Topics: Hope: The topic of hope was presented in order to help patients better understand the symptoms of hopelessness and how to become more hopeful. Patients discussed their current awareness of the topic and relevance to their functioning. Individual experiences with symptoms and treatment options were also discussed. Patients explored options for ongoing/future treatment and symptom management.      Patient Session Goals / Objectives:    Discussed definition of hopelessness    Discussed how hopelessness impacts functioning    Set a plan to utilize skills to reduce hopelessness      Patient Participation /  Response:  Fully participated with the group by sharing personal reflections / insights and openly received / provided feedback with other participants.    Demonstrated understanding of topics discussed through group discussion and participation, Identified / Expressed readiness to act on skill suggestions discussed in topic and Verbalized understanding of ways to proactively manage illness    Treatment Plan:  Patient has a current master individualized treatment plan.  See Epic treatment plan for more information.    Tyra Ricci, [facility]

## 2021-11-11 NOTE — ADDENDUM NOTE
Encounter addended by: Tyra Ricci [facility] on: 11/11/2021 3:21 PM   Actions taken: Clinical Note Signed, Flowsheet accepted

## 2021-11-15 ENCOUNTER — HOSPITAL ENCOUNTER (OUTPATIENT)
Dept: BEHAVIORAL HEALTH | Facility: CLINIC | Age: 26
End: 2021-11-15
Attending: PSYCHIATRY & NEUROLOGY
Payer: COMMERCIAL

## 2021-11-15 PROCEDURE — 90853 GROUP PSYCHOTHERAPY: CPT | Mod: 95 | Performed by: COUNSELOR

## 2021-11-15 PROCEDURE — G0177 OPPS/PHP; TRAIN & EDUC SERV: HCPCS | Mod: 95

## 2021-11-15 NOTE — GROUP NOTE
Psychoeducation Group Note    PATIENT'S NAME: Nimisha Jasso  MRN:   [id number]  :   1995  ACCT. NUMBER: [id number]  DATE OF SERVICE: 11/15/21  START TIME:  2:00 PM  END TIME:  2:50 PM  FACILITATOR: Nehemiah Freeman OTR/L  TOPIC: MH Life Skills Group: Lifestyle Balance and Structure                                    Service Modality:  Video Visit     Telemedicine Visit: The patient's condition can be safely assessed and treated via synchronous audio and visual telemedicine encounter.      Reason for Telemedicine Visit: Services only offered telehealth    Originating Site (Patient Location): Patient's home    Distant Site (Provider Location): Provider Remote Setting- Home Office    Consent:  The patient/guardian has verbally consented to: the potential risks and benefits of telemedicine (video visit) versus in person care; bill my insurance or make self-payment for services provided; and responsibility for payment of non-covered services.     Mode of Communication:  Video Conference via Medical Zoom    As the provider I attest to compliance with applicable laws and regulations related to telemedicine.       [facility] Mental Health Day Treatment  TRACK: 1B    NUMBER OF PARTICIPANTS: 6    Summary of Group / Topics Discussed:  Lifestyle Balance and Strucure:  Healthy Body /Healthy Mind: Patients were assisted to identify meaningful roles that they want to promote and the impact their mental health symptoms have on this.  Patients learned, applied, and generalized skills needed to live and participate in meaningful roles as effectively and independently as possible.  Patients developed awareness of strengths and challenges in fulfilling these roles and worked on integrating specific and individualized rituals and habits into their daily life.     Patient Session Goals / Objectives:    Improved awareness and engagement in life s meaningful roles, routines, habits, and rituals    Explored and  identified current roles and challenges due to mental health symptoms     Identified ways to establish and integrate daily self care and wellness routines and habits to support mental health recovery    Practiced and reflected on how to generalize taught skills to their everyday life      Patient Participation / Response:  Fully participated with the group by sharing personal reflections / insights and openly received / provided feedback with other participants.    Demonstrated understanding of content through handouts/group discussion , Verbalized understanding of content and Patient would benefit from additional opportunities to practice the content to be able to generalize it to their everyday life with increased intentionality, consistency, and efficacy in support of their psychiatric recovery    Treatment Plan:  Patient has a current master individualized treatment plan.  See Epic treatment plan for more information.    Nehemiah Freeman, OTR/L

## 2021-11-15 NOTE — GROUP NOTE
Psychotherapy Group Note    PATIENT'S NAME: Nimisha Jasso  MRN:   [id number]  :   1995  ACCT. NUMBER: [id number]  DATE OF SERVICE: 11/15/21  START TIME:  3:00 PM  END TIME:  3:50 PM  FACILITATOR: Tyra Ricci LPCC  TOPIC: MH EBP Group: Relationship Skills  [facility] Adult Mental Health Day Treatment  TRACK: 1B    NUMBER OF PARTICIPANTS: 6                                      Service Modality:  Video Visit     Telemedicine Visit: The patient's condition can be safely assessed and treated via synchronous audio and visual telemedicine encounter.      Reason for Telemedicine Visit: Services only offered telehealth    Originating Site (Patient Location): Patient's home    Distant Site (Provider Location): Provider Remote Setting- Home Office    Consent:  The patient/guardian has verbally consented to: the potential risks and benefits of telemedicine (video visit) versus in person care; bill my insurance or make self-payment for services provided; and responsibility for payment of non-covered services.     Patient would like the video invitation sent by:  My Chart    Mode of Communication:  Video Conference via Medical Zoom    As the provider I attest to compliance with applicable laws and regulations related to telemedicine.          Summary of Group / Topics Discussed:  Relationship Skills: Assertive Communication: Patients were provided with a general overview of assertive communication skills and how practicing assertive communication skills will assist patients in developing healthier and more effective relationships. Patients reviewed their current awareness on ability to practice assertive communication, ways to increase assertive communication, and identified/problem solved barriers to assertive communication.     Patient Session Goals / Objectives:    Identified and discussed patient individual challenges with communication    Presented and practiced effective communication skills in  session    Assisted patients in implementing more effective communication skills in their relationships      Patient Participation / Response:  Fully participated with the group by sharing personal reflections / insights and openly received / provided feedback with other participants.    Demonstrated understanding of topics discussed through group discussion and participation, Demonstrated understanding of relationship skills and communication skills and Identified / Expressed personal readiness to incorporate effective communication skills    Treatment Plan:  Patient has a current master individualized treatment plan.  See Epic treatment plan for more information.    Tyra Ricci, [facility]

## 2021-11-15 NOTE — GROUP NOTE
"Process Group Note    PATIENT'S NAME: Nimisha Jasso  MRN:   [id number]  :   1995  ACCT. NUMBER: [id number]  DATE OF SERVICE: 11/15/21  START TIME:  1:00 PM  END TIME:  1:50 PM  FACILITATOR: Tyra Ricci [facility]  TOPIC:  Process Group    Diagnoses:  296.32 (F33.1) Major Depressive Disorder, Recurrent Episode, Moderate  300.02 (F41.1) Generalized Anxiety Disorder      [facility] Day Treatment  TRACK: 1B    NUMBER OF PARTICIPANTS: 6                                      Service Modality:  Video Visit     Telemedicine Visit: The patient's condition can be safely assessed and treated via synchronous audio and visual telemedicine encounter.      Reason for Telemedicine Visit: Services only offered telehealth    Originating Site (Patient Location): Patient's home    Distant Site (Provider Location): Provider Remote Setting- Home Office    Consent:  The patient/guardian has verbally consented to: the potential risks and benefits of telemedicine (video visit) versus in person care; bill my insurance or make self-payment for services provided; and responsibility for payment of non-covered services.     Patient would like the video invitation sent by:  My Chart    Mode of Communication:  Video Conference via Medical Zoom    As the provider I attest to compliance with applicable laws and regulations related to telemedicine.                Data:    Session content: At the start of this group, patients were invited to check in by identifying themselves, describing their current emotional status, and identifying issues to address in this group.   Area(s) of treatment focus addressed in this session included Symptom Management and Personal Safety.    Keith reported feeling \"okay overall\" today but just tired because they didn't sleep much last night.  Their goal for the day is to call a psychologist to schedule some testing. They feel like they are about to \"crash\" and trying to do it as " gradually as possible.  They took process time to share that they had a psychiatry appointment this morning and they were recommended to get tested for bipolar disorder and ADHD.  They are worried about what their therapist will think because their therapist doesn't think they have bipolar or ADHD.    Therapeutic Interventions/Treatment Strategies:  Psychotherapist offered support, feedback and validation and reinforced use of skills. Treatment modalities used include Motivational Interviewing, Cognitive Behavioral Therapy and Dialectical Behavioral Therapy. Interventions include Symptoms Management: Promoted understanding of their diagnoses and how it impacts their functioning.    Assessment:    Patient response:   Patient responded to session by accepting feedback, giving feedback and listening    Possible barriers to participation / learning include: and no barriers identified    Health Issues:   None reported       Substance Use Review:   Substance Use: No active concerns identified.    Mental Status/Behavioral Observations  Appearance:   Appropriate   Eye Contact:   Good   Psychomotor Behavior: Normal   Attitude:   Cooperative   Orientation:   All  Speech   Rate / Production: Normal    Volume:  Normal   Mood:    Anxious   Affect:    Appropriate   Thought Content:   Clear  Thought Form:  Coherent  Logical     Insight:    Good     Plan:     Safety Plan: No current safety concerns identified.  Recommended that patient call 911 or go to the local ED should there be a change in any of these risk factors.     Barriers to treatment: None identified    Patient Contracts (see media tab):  None    Substance Use: Not addressed in session     Continue or Discharge: Patient will continue in Adult Day Treatment (ADT)  as planned. Patient is likely to benefit from learning and using skills as they work toward the goals identified in their treatment plan.      Tyra Ricci, [facility]  November 15, 2021

## 2021-11-17 ENCOUNTER — HOSPITAL ENCOUNTER (OUTPATIENT)
Dept: BEHAVIORAL HEALTH | Facility: CLINIC | Age: 26
End: 2021-11-17
Attending: PSYCHIATRY & NEUROLOGY
Payer: COMMERCIAL

## 2021-11-17 PROCEDURE — 90853 GROUP PSYCHOTHERAPY: CPT | Mod: GT | Performed by: COUNSELOR

## 2021-11-17 PROCEDURE — G0177 OPPS/PHP; TRAIN & EDUC SERV: HCPCS | Mod: GT

## 2021-11-17 NOTE — GROUP NOTE
Psychoeducation Group Note    PATIENT'S NAME: Nimisha Jasso  MRN:   [id number]  :   1995  ACCT. NUMBER: [id number]  DATE OF SERVICE: 21  START TIME:  3:00 PM  END TIME:  3:50 PM  FACILITATOR: Tahira Lam RN  TOPIC: MH RN Group: Mental Health Maintenance                                    Service Modality:  Video Visit     Telemedicine Visit: The patient's condition can be safely assessed and treated via synchronous audio and visual telemedicine encounter.      Reason for Telemedicine Visit:  covid19    Originating Site (Patient Location): Patient's home    Distant Site (Provider Location): Provider Remote Setting- Home Office    Consent:  The patient/guardian has verbally consented to: the potential risks and benefits of telemedicine (video visit) versus in person care; bill my insurance or make self-payment for services provided; and responsibility for payment of non-covered services.     Patient would like the video invitation sent by:  My Chart    Mode of Communication:  Video Conference via Medical Zoom    As the provider I attest to compliance with applicable laws and regulations related to telemedicine.          [facility] Adult Mental Health Day Treatment  TRACK: 1B    NUMBER OF PARTICIPANTS: 5    Summary of Group / Topics Discussed:  Mental Health Maintenance:  Vulnerability: In this group, the concept of vulnerability was explored through the viewing, discussion, and self-reflection of the Rajani Redmond Talk Titled,  The Power of Vulnerability.      Patient Session Goals / Objectives:  ? Defined and described definition of vulnerability   ? Identified 2 or more ways of practicing authenticity         Patient Participation / Response:  Moderately participated, sharing some personal reflections / insights and adequately adequately received / provided feedback with other participants.    Verbalized understanding of mental health maintenance topic    Treatment Plan:  Patient has  a current master individualized treatment plan.  See Epic treatment plan for more information.    Tahira Lam RN

## 2021-11-17 NOTE — GROUP NOTE
"Process Group Note    PATIENT'S NAME: Nimisha Jasso  MRN:   [id number]  :   1995  ACCT. NUMBER: [id number]  DATE OF SERVICE: 21  START TIME:  2:00 PM  END TIME:  2:50 PM  FACILITATOR: Tyra Ricci [facility]  TOPIC:  Process Group    Diagnoses:  296.32 (F33.1) Major Depressive Disorder, Recurrent Episode, Moderate  300.02 (F41.1) Generalized Anxiety Disorder      [facility] Day Treatment  TRACK: 1B                                      Service Modality:  Video Visit     Telemedicine Visit: The patient's condition can be safely assessed and treated via synchronous audio and visual telemedicine encounter.      Reason for Telemedicine Visit: Services only offered telehealth    Originating Site (Patient Location): Patient's home    Distant Site (Provider Location): Provider Remote Setting- Home Office    Consent:  The patient/guardian has verbally consented to: the potential risks and benefits of telemedicine (video visit) versus in person care; bill my insurance or make self-payment for services provided; and responsibility for payment of non-covered services.     Patient would like the video invitation sent by:  My Chart    Mode of Communication:  Video Conference via Medical Zoom    As the provider I attest to compliance with applicable laws and regulations related to telemedicine.          NUMBER OF PARTICIPANTS: 6          Data:    Session content: At the start of this group, patients were invited to check in by identifying themselves, describing their current emotional status, and identifying issues to address in this group.   Area(s) of treatment focus addressed in this session included Symptom Management, Personal Safety and Abstinence/Relapse Prevention.    Keith reported feeling \"okay\" but \"conflicted\" today.  Their goal is to send several e-mails to people today. Patient declined additional process time but contributed to group discussion and provided feedback and " support to peers.      Therapeutic Interventions/Treatment Strategies:  Psychotherapist offered support, feedback and validation and reinforced use of skills.    Assessment:    Patient response:   Patient responded to session by accepting feedback, giving feedback and listening    Possible barriers to participation / learning include: and no barriers identified    Health Issues:   None reported       Substance Use Review:   Substance Use: No active concerns identified.    Mental Status/Behavioral Observations  Appearance:   Appropriate   Eye Contact:   Good   Psychomotor Behavior: Normal   Attitude:   Cooperative   Orientation:   All  Speech   Rate / Production: Normal    Volume:  Normal   Mood:    Depressed   Affect:    Appropriate   Thought Content:   Clear  Thought Form:  Coherent  Logical     Insight:    Good     Plan:     Safety Plan: No current safety concerns identified.  Recommended that patient call 911 or go to the local ED should there be a change in any of these risk factors.     Barriers to treatment: None identified    Patient Contracts (see media tab):  None    Substance Use: Not addressed in session     Continue or Discharge: Patient will continue in Adult Day Treatment (ADT)  as planned. Patient is likely to benefit from learning and using skills as they work toward the goals identified in their treatment plan.      Tyra Ricci, [facility]  November 17, 2021

## 2021-11-17 NOTE — GROUP NOTE
Psychoeducation Group Note    PATIENT'S NAME: Nimisha Jasso  MRN:   [id number]  :   1995  ACCT. NUMBER: [id number]  DATE OF SERVICE: 21  START TIME:  1:00 PM  END TIME:  1:50 PM  FACILITATOR: Nehemiha Freeman OTR/L  TOPIC: MH Life Skills Group: Resiliency Development                                    Service Modality:  Video Visit     Telemedicine Visit: The patient's condition can be safely assessed and treated via synchronous audio and visual telemedicine encounter.      Reason for Telemedicine Visit: Services only offered telehealth    Originating Site (Patient Location): Patient's home    Distant Site (Provider Location): Provider Remote Setting- Home Office    Consent:  The patient/guardian has verbally consented to: the potential risks and benefits of telemedicine (video visit) versus in person care; bill my insurance or make self-payment for services provided; and responsibility for payment of non-covered services.     Mode of Communication:  Video Conference via Medical Zoom    As the provider I attest to compliance with applicable laws and regulations related to telemedicine.       [facility] Adult Mental Health Day Treatment  TRACK: 1B    NUMBER OF PARTICIPANTS: 6    Summary of Group / Topics Discussed:  Resiliency Development:  Coping Skills: Personal Recovery Outcome Measure: Patients were taught how to identify coping strategies and routines that they can adopt and use for management with focus on a balance between physical, emotional, social and spiritual strategies.    Patient Session Goals / Objectives:    Identified personal definitions of recovery for effectively managing both mental health and substance abuse/abuse symptoms     Improved awareness of the process of recovery and skills and strategies that support this       Established a plan for practice of these skills in their own environments    Practiced and reflected on how to generalize taught skills to their  everyday life      Patient Participation / Response:  Fully participated with the group by sharing personal reflections / insights and openly received / provided feedback with other participants.    Demonstrated understanding of content through handouts/group discussion , Verbalized understanding of content and Patient would benefit from additional opportunities to practice the content to be able to generalize it to their everyday life with increased intentionality, consistency, and efficacy in support of their psychiatric recovery    Treatment Plan:  Patient has a current master individualized treatment plan.  See Epic treatment plan for more information.    Nehemiah Freeman, OTR/L

## 2021-11-18 ENCOUNTER — HOSPITAL ENCOUNTER (OUTPATIENT)
Dept: BEHAVIORAL HEALTH | Facility: CLINIC | Age: 26
End: 2021-11-18
Attending: PSYCHIATRY & NEUROLOGY
Payer: COMMERCIAL

## 2021-11-18 PROCEDURE — G0177 OPPS/PHP; TRAIN & EDUC SERV: HCPCS | Mod: 95

## 2021-11-18 PROCEDURE — 90853 GROUP PSYCHOTHERAPY: CPT | Mod: GT | Performed by: COUNSELOR

## 2021-11-18 PROCEDURE — 90853 GROUP PSYCHOTHERAPY: CPT | Mod: 95 | Performed by: COUNSELOR

## 2021-11-18 NOTE — GROUP NOTE
"Process Group Note    PATIENT'S NAME: Nimisha Jasso  MRN:   [id number]  :   1995  ACCT. NUMBER: [id number]  DATE OF SERVICE: 21  START TIME:  1:00 PM  END TIME:  1:50 PM  FACILITATOR: Tyra Ricci [facility]  TOPIC:  Process Group    Diagnoses:  296.32 (F33.1) Major Depressive Disorder, Recurrent Episode, Moderate  300.02 (F41.1) Generalized Anxiety Disorder      [facility] Mental [facility] Day Treatment  TRACK: 1B    NUMBER OF PARTICIPANTS: 5                                      Service Modality:  Video Visit     Telemedicine Visit: The patient's condition can be safely assessed and treated via synchronous audio and visual telemedicine encounter.      Reason for Telemedicine Visit: Services only offered telehealth    Originating Site (Patient Location): Patient's home    Distant Site (Provider Location): Provider Remote Setting- Home Office    Consent:  The patient/guardian has verbally consented to: the potential risks and benefits of telemedicine (video visit) versus in person care; bill my insurance or make self-payment for services provided; and responsibility for payment of non-covered services.     Patient would like the video invitation sent by:  My Chart    Mode of Communication:  Video Conference via Medical Zoom    As the provider I attest to compliance with applicable laws and regulations related to telemedicine.                Data:    Session content: At the start of this group, patients were invited to check in by identifying themselves, describing their current emotional status, and identifying issues to address in this group.   Area(s) of treatment focus addressed in this session included Symptom Management and Personal Safety.    Keith reported feeling \"overwhelmed\" today.  Their goal is to get some things done on their to-do list and take breaks and reward themselves along the way.  Patient declined additional process time but contributed to group discussion and " provided feedback and support to peers.        Therapeutic Interventions/Treatment Strategies:  Psychotherapist offered support, feedback and validation and reinforced use of skills.    Assessment:    Patient response:   Patient responded to session by accepting feedback, giving feedback and listening    Possible barriers to participation / learning include: and no barriers identified    Health Issues:   None reported       Substance Use Review:   Substance Use: No active concerns identified.    Mental Status/Behavioral Observations  Appearance:   Appropriate   Eye Contact:   Good   Psychomotor Behavior: Normal   Attitude:   Cooperative   Orientation:   All  Speech   Rate / Production: Normal    Volume:  Normal   Mood:    Depressed   Affect:    Appropriate   Thought Content:   Clear  Thought Form:  Coherent  Logical     Insight:    Good     Plan:     Safety Plan: No current safety concerns identified.  Recommended that patient call 911 or go to the local ED should there be a change in any of these risk factors.     Barriers to treatment: None identified    Patient Contracts (see media tab):  None    Substance Use: Provided encouragement towards sobriety    Provided support and affirmation for steps taken towards sobriety      Continue or Discharge: Patient will continue in Adult Dual Disorder Program (DDP) as planned. Patient is likely to benefit from learning and using skills as they work toward the goals identified in their treatment plan.      Tyra Ricci, [facility]  November 18, 2021

## 2021-11-18 NOTE — GROUP NOTE
Psychoeducation Group Note    PATIENT'S NAME: Nimisha Jasso  MRN:   [id number]  :   1995  ACCT. NUMBER: [id number]  DATE OF SERVICE: 21  START TIME:  3:00 PM  END TIME:  3:50 PM  FACILITATOR: Malgorzata Fox RN  TOPIC: VANESSA RN Group: Health Maintenance  [facility] Adult Mental Health Day Treatment  TRACK: 1B    NUMBER OF PARTICIPANTS: 5    Summary of Group / Topics Discussed:  Health Maintenance: Weekend planning: Patients were given time to complete a weekend plan of what they will do to promote wellness and sobriety over the weekend when they do not have the structure of group. Patients were encouraged to review progress on their treatment goals and were challenged to identify ways to work toward meeting them. Patients identified and discussed foreseeable barriers to success over the weekend and then developed a plan to overcome them. Patients reviewed their distress coping skills and social support network and discussed this with the group.       Patient Session Goals / Objectives:    ?    Identified activities to engage in that promote balance in wellness  ?    Distinguished possible barriers to success over the weekend and created a plan to overcome them  ?    Listed distress coping skills and identified social support network to utilize if in crisis during the weekend                                      Service Modality:  Video Visit     Telemedicine Visit: The patient's condition can be safely assessed and treated via synchronous audio and visual telemedicine encounter.      Reason for Telemedicine Visit: Services only offered telehealth    Originating Site (Patient Location): Patient's home    Distant Site (Provider Location): Provider Remote Setting- Home Office    Consent:  The patient/guardian has verbally consented to: the potential risks and benefits of telemedicine (video visit) versus in person care; bill my insurance or make self-payment for services provided; and  responsibility for payment of non-covered services.     Patient would like the video invitation sent by:  My Chart    Mode of Communication:  Video Conference via Medical Zoom    As the provider I attest to compliance with applicable laws and regulations related to telemedicine.           Patient Participation / Response:  Fully participated with the group by sharing personal reflections / insights and openly received / provided feedback with other participants.    Identified / Expressed personal readiness to practice skills    Treatment Plan:  Patient has a current master individualized treatment plan.  See Epic treatment plan for more information.    Malgorzata Fox RN

## 2021-11-18 NOTE — GROUP NOTE
Psychotherapy Group Note    PATIENT'S NAME: Nimisha Jasso  MRN:   [id number]  :   1995  ACCT. NUMBER: [id number]  DATE OF SERVICE: 21  START TIME:  2:00 PM  END TIME:  2:50 PM  FACILITATOR: Tyra Ricci [facility]  TOPIC: MH EBP Group: Relationship Skills  [facility] Adult Mental Health Day Treatment  TRACK: 1B    NUMBER OF PARTICIPANTS: 6                                      Service Modality:  Video Visit     Telemedicine Visit: The patient's condition can be safely assessed and treated via synchronous audio and visual telemedicine encounter.      Reason for Telemedicine Visit: Services only offered telehealth    Originating Site (Patient Location): Patient's home    Distant Site (Provider Location): Provider Remote Setting- Home Office    Consent:  The patient/guardian has verbally consented to: the potential risks and benefits of telemedicine (video visit) versus in person care; bill my insurance or make self-payment for services provided; and responsibility for payment of non-covered services.     Patient would like the video invitation sent by:  My Chart    Mode of Communication:  Video Conference via Medical Zoom    As the provider I attest to compliance with applicable laws and regulations related to telemedicine.          Summary of Group / Topics Discussed:  Relationship Skills: Boundaries: Patients were provided with a general overview of interpersonal boundaries and how lack of boundaries relates to symptoms and functioning. The purpose is to help patients identify boundary issues and gain awareness and skills to work towards healthier interpersonal boundaries. Current awareness of healthy boundary characteristics and barriers to establishing healthy boundaries were discussed.    Patient Session Goals / Objectives:    Familiarized patients with the concept of interpersonal boundaries and their characteristics    Discussed and practiced strategies to promote healthier interpersonal  boundaries    Identified boundary issues and identified plan to improve boundaries      Patient Participation / Response:  Fully participated with the group by sharing personal reflections / insights and openly received / provided feedback with other participants.    Demonstrated understanding of topics discussed through group discussion and participation, Demonstrated understanding of relationship skills and communication skills and Identified / Expressed personal readiness to incorporate effective communication skills    Treatment Plan:  Patient has a current master individualized treatment plan.  See Epic treatment plan for more information.    Tyra Ricci, LPCC

## 2021-11-22 ENCOUNTER — HOSPITAL ENCOUNTER (OUTPATIENT)
Dept: BEHAVIORAL HEALTH | Facility: CLINIC | Age: 26
End: 2021-11-22
Attending: PSYCHIATRY & NEUROLOGY
Payer: COMMERCIAL

## 2021-11-22 PROCEDURE — 90853 GROUP PSYCHOTHERAPY: CPT | Mod: GT | Performed by: COUNSELOR

## 2021-11-22 PROCEDURE — G0177 OPPS/PHP; TRAIN & EDUC SERV: HCPCS | Mod: GT

## 2021-11-22 PROCEDURE — 90853 GROUP PSYCHOTHERAPY: CPT | Mod: 95 | Performed by: COUNSELOR

## 2021-11-22 NOTE — GROUP NOTE
"Process Group Note    PATIENT'S NAME: Nimisha Jasso  MRN:   [id number]  :   1995  ACCT. NUMBER: [id number]  DATE OF SERVICE: 21  START TIME:  1:00 PM  END TIME:  1:50 PM  FACILITATOR: Tyra Ricci [facility]  TOPIC:  Process Group    Diagnoses:  296.32 (F33.1) Major Depressive Disorder, Recurrent Episode, Moderate  300.02 (F41.1) Generalized Anxiety Disorder      [facility] Mental [facility] Day Treatment  TRACK: 1B    NUMBER OF PARTICIPANTS: 5                                      Service Modality:  Video Visit     Telemedicine Visit: The patient's condition can be safely assessed and treated via synchronous audio and visual telemedicine encounter.      Reason for Telemedicine Visit: Services only offered telehealth    Originating Site (Patient Location): Patient's home    Distant Site (Provider Location): Provider Remote Setting- Home Office    Consent:  The patient/guardian has verbally consented to: the potential risks and benefits of telemedicine (video visit) versus in person care; bill my insurance or make self-payment for services provided; and responsibility for payment of non-covered services.     Patient would like the video invitation sent by:  My Chart    Mode of Communication:  Video Conference via Medical Zoom    As the provider I attest to compliance with applicable laws and regulations related to telemedicine.                Data:    Session content: At the start of this group, patients were invited to check in by identifying themselves, describing their current emotional status, and identifying issues to address in this group.   Area(s) of treatment focus addressed in this session included Symptom Management and Personal Safety.    Keith reported feeling \"low\" and \"tired\".  They are also feeling \"overwhelmed\" because they know they have to go back to work soon and really doesn't want to.  Their goal for the day is to call their insurance to set up a new plan. Patient " declined additional process time but contributed to group discussion and provided feedback and support to peers.      Therapeutic Interventions/Treatment Strategies:  Psychotherapist offered support, feedback and validation and reinforced use of skills.    Assessment:    Patient response:   Patient responded to session by accepting feedback, giving feedback and listening    Possible barriers to participation / learning include: and no barriers identified    Health Issues:   None reported       Substance Use Review:   Substance Use: No active concerns identified.    Mental Status/Behavioral Observations  Appearance:   Appropriate   Eye Contact:   Good   Psychomotor Behavior: Normal   Attitude:   Cooperative   Orientation:   All  Speech   Rate / Production: Normal    Volume:  Normal   Mood:    Depressed   Affect:    Appropriate   Thought Content:   Clear  Thought Form:  Coherent  Logical     Insight:    Good     Plan:     Safety Plan: No current safety concerns identified.  Recommended that patient call 911 or go to the local ED should there be a change in any of these risk factors.     Barriers to treatment: None identified    Patient Contracts (see media tab):  None    Substance Use: Not addressed in session     Continue or Discharge: Patient will continue in Adult Day Treatment (ADT)  as planned. Patient is likely to benefit from learning and using skills as they work toward the goals identified in their treatment plan.      Tyra Ricci, [facility]  November 22, 2021

## 2021-11-22 NOTE — GROUP NOTE
Psychoeducation Group Note    PATIENT'S NAME: Nimisha Jasso  MRN:   [id number]  :   1995  ACCT. NUMBER: [id number]  DATE OF SERVICE: 21  START TIME:  2:00 PM  END TIME:  2:50 PM  FACILITATOR: Nehemiah Freeman OTR/L  TOPIC: MH Life Skills Group: Resiliency Development                                    Service Modality:  Video Visit     Telemedicine Visit: The patient's condition can be safely assessed and treated via synchronous audio and visual telemedicine encounter.      Reason for Telemedicine Visit: Services only offered telehealth    Originating Site (Patient Location): Patient's home    Distant Site (Provider Location): Provider Remote Setting- Home Office    Consent:  The patient/guardian has verbally consented to: the potential risks and benefits of telemedicine (video visit) versus in person care; bill my insurance or make self-payment for services provided; and responsibility for payment of non-covered services.     Mode of Communication:  Video Conference via Medical Zoom    As the provider I attest to compliance with applicable laws and regulations related to telemedicine.       [facility] Adult Mental Health Day Treatment  TRACK: 1B    NUMBER OF PARTICIPANTS: 5    Summary of Group / Topics Discussed:  Resiliency Development:  Coping Skills(Strategies to Improve Motivation(: Patients were taught how to identify stressors, signs of stress, coping skills, and prevention strategies for overall stress management.  Patients were given the opportunity to identify both ongoing and acute mental health symptoms and how to effectively manage these symptoms by developing an effective aftercare plan.  Patients increased awareness of community based resources.    Patient Session Goals / Objectives:    Identified how using coping skills can be used for symptom and stress management       Improved awareness of individualed symptoms and stressors and how to effectively cope     Established a  relapse prevention plan to practice these skills in their own environments    Practiced and reflected on how to generalize taught skills to their everyday life        Patient Participation / Response:  Fully participated with the group by sharing personal reflections / insights and openly received / provided feedback with other participants.    Demonstrated understanding of content through video/handouts/group discussion , Verbalized understanding of content and Patient would benefit from additional opportunities to practice the content to be able to generalize it to their everyday life with increased intentionality, consistency, and efficacy in support of their psychiatric recovery    Treatment Plan:  Patient has a current master individualized treatment plan.  See Epic treatment plan for more information.    Nehemiah Freeman, OTR/L

## 2021-11-22 NOTE — GROUP NOTE
Psychotherapy Group Note    PATIENT'S NAME: Nimisha Jasso  MRN:   [id number]  :   1995  ACCT. NUMBER: [id number]  DATE OF SERVICE: 21  START TIME:  3:00 PM  END TIME:  3:50 PM  FACILITATOR: Tyra Ricci LPCC  TOPIC: MH EBP Group: Self-Awareness  [facility] Adult Mental Health Day Treatment  TRACK: 1B    NUMBER OF PARTICIPANTS: 5                                      Service Modality:  Video Visit     Telemedicine Visit: The patient's condition can be safely assessed and treated via synchronous audio and visual telemedicine encounter.      Reason for Telemedicine Visit: Services only offered telehealth    Originating Site (Patient Location): Patient's home    Distant Site (Provider Location): Provider Remote Setting- Home Office    Consent:  The patient/guardian has verbally consented to: the potential risks and benefits of telemedicine (video visit) versus in person care; bill my insurance or make self-payment for services provided; and responsibility for payment of non-covered services.     Patient would like the video invitation sent by:  My Chart    Mode of Communication:  Video Conference via Medical Zoom    As the provider I attest to compliance with applicable laws and regulations related to telemedicine.          Summary of Group / Topics Discussed:  Self-Awareness: Values: Patients identified personal values by examining development of their current values and how their values influence their daily functioning and life choices. Patients explored the impact of their values on their thoughts, feelings, and actions. Patients discussed definition of personal values and how they develop and change over time. The goal is to help patients reconcile value conflicts and achieve balance and flexibility to improve mood and daily functioning.     Patient Session Goals / Objectives:    Examined development of values and impact of values on functioning    Identified and prioritized important  values related to current well-being     Identified strategies to change or enhance values to positively impact symptoms    Assisted patients to find ways to adapt functioning to better fit their values      Patient Participation / Response:  Fully participated with the group by sharing personal reflections / insights and openly received / provided feedback with other participants.    Demonstrated understanding of topics discussed through group discussion and participation, Demonstrated understanding of values, strengths, and challenges to learn about themselves and Identified / Expressed readiness to act intentionally, increase self-compassion, promote personal growth    Treatment Plan:  Patient has a current master individualized treatment plan.  See Epic treatment plan for more information.    Tyra Ricci, [facility]

## 2021-11-24 ENCOUNTER — HOSPITAL ENCOUNTER (OUTPATIENT)
Dept: BEHAVIORAL HEALTH | Facility: CLINIC | Age: 26
End: 2021-11-24
Attending: PSYCHIATRY & NEUROLOGY
Payer: COMMERCIAL

## 2021-11-24 PROCEDURE — G0177 OPPS/PHP; TRAIN & EDUC SERV: HCPCS | Mod: 95

## 2021-11-24 PROCEDURE — 90853 GROUP PSYCHOTHERAPY: CPT | Mod: 95 | Performed by: COUNSELOR

## 2021-11-24 NOTE — GROUP NOTE
"Process Group Note    PATIENT'S NAME: Nimisha Jasso  MRN:   [id number]  :   1995  ACCT. NUMBER: [id number]  DATE OF SERVICE: 21  START TIME:  2:00 PM  END TIME:  2:50 PM  FACILITATOR: Tyra Ricci [facility]  TOPIC:  Process Group    Diagnoses:  296.32 (F33.1) Major Depressive Disorder, Recurrent Episode, Moderate  300.02 (F41.1) Generalized Anxiety Disorder      [facility] Mental [facility] Day Treatment  TRACK: 1B    NUMBER OF PARTICIPANTS: 8                                      Service Modality:  Video Visit     Telemedicine Visit: The patient's condition can be safely assessed and treated via synchronous audio and visual telemedicine encounter.      Reason for Telemedicine Visit: Services only offered telehealth    Originating Site (Patient Location): Patient's home    Distant Site (Provider Location): Provider Remote Setting- Home Office    Consent:  The patient/guardian has verbally consented to: the potential risks and benefits of telemedicine (video visit) versus in person care; bill my insurance or make self-payment for services provided; and responsibility for payment of non-covered services.     Patient would like the video invitation sent by:  My Chart    Mode of Communication:  Video Conference via Medical Zoom    As the provider I attest to compliance with applicable laws and regulations related to telemedicine.                Data:    Session content: At the start of this group, patients were invited to check in by identifying themselves, describing their current emotional status, and identifying issues to address in this group.   Area(s) of treatment focus addressed in this session included Symptom Management and Personal Safety.    Keith reported feeling \"shitty\" today.  Their goal for the day is to get packed to leave for the holidays. Patient declined additional process time but contributed to group discussion and provided feedback and support to peers.      Therapeutic " Interventions/Treatment Strategies:  Psychotherapist offered support, feedback and validation.    Assessment:    Patient response:   Patient responded to session by accepting feedback, giving feedback and listening    Possible barriers to participation / learning include: and no barriers identified    Health Issues:   None reported       Substance Use Review:   Substance Use: No active concerns identified.    Mental Status/Behavioral Observations  Appearance:   Appropriate   Eye Contact:   Good   Psychomotor Behavior: Normal   Attitude:   Cooperative   Orientation:   All  Speech   Rate / Production: Normal    Volume:  Normal   Mood:    Anxious  Depressed   Affect:    Appropriate   Thought Content:   Clear  Thought Form:  Coherent  Logical     Insight:    Good     Plan:     Safety Plan: No current safety concerns identified.  Recommended that patient call 911 or go to the local ED should there be a change in any of these risk factors.     Barriers to treatment: None identified    Patient Contracts (see media tab):  None    Substance Use: Not addressed in session     Continue or Discharge: Patient will continue in Adult Day Treatment (ADT)  as planned. Patient is likely to benefit from learning and using skills as they work toward the goals identified in their treatment plan.      Tyra Ricci, [facility]  November 24, 2021

## 2021-11-24 NOTE — GROUP NOTE
Psychoeducation Group Note    PATIENT'S NAME: Nimisha Jasso  MRN:   [id number]  :   1995  ACCT. NUMBER: [id number]  DATE OF SERVICE: 21  START TIME:  3:00 PM  END TIME:  3:50 PM  FACILITATOR: Malgorzata Fox RN  TOPIC: VANESSA RN Group: Health Maintenance  [facility] Adult Mental Health Day Treatment  TRACK: 1B    NUMBER OF PARTICIPANTS: 6    Summary of Group / Topics Discussed:  Health Maintenance: Weekend planning: Patients were given time to complete a weekend plan of what they will do to promote wellness and sobriety over the weekend when they do not have the structure of group. Patients were encouraged to review progress on their treatment goals and were challenged to identify ways to work toward meeting them. Patients identified and discussed foreseeable barriers to success over the weekend and then developed a plan to overcome them. Patients reviewed their distress coping skills and social support network and discussed this with the group.       Patient Session Goals / Objectives:    ?    Identified activities to engage in that promote balance in wellness  ?    Distinguished possible barriers to success over the weekend and created a plan to overcome them  ?    Listed distress coping skills and identified social support network to utilize if in crisis during the weekend                                      Service Modality:  Video Visit     Telemedicine Visit: The patient's condition can be safely assessed and treated via synchronous audio and visual telemedicine encounter.      Reason for Telemedicine Visit: Services only offered telehealth    Originating Site (Patient Location): Patient's home    Distant Site (Provider Location): Provider Remote Setting- Home Office    Consent:  The patient/guardian has verbally consented to: the potential risks and benefits of telemedicine (video visit) versus in person care; bill my insurance or make self-payment for services provided; and  responsibility for payment of non-covered services.     Patient would like the video invitation sent by:  My Chart    Mode of Communication:  Video Conference via Medical Zoom    As the provider I attest to compliance with applicable laws and regulations related to telemedicine.           Patient Participation / Response:  Fully participated with the group by sharing personal reflections / insights and openly received / provided feedback with other participants.    Identified / Expressed personal readiness to practice skills    Treatment Plan:  Patient has a current master individualized treatment plan.  See Epic treatment plan for more information.    Malgorzata Fox RN

## 2021-11-29 ENCOUNTER — HOSPITAL ENCOUNTER (OUTPATIENT)
Dept: BEHAVIORAL HEALTH | Facility: CLINIC | Age: 26
End: 2021-11-29
Attending: PSYCHIATRY & NEUROLOGY
Payer: COMMERCIAL

## 2021-11-29 PROCEDURE — G0177 OPPS/PHP; TRAIN & EDUC SERV: HCPCS | Mod: 95

## 2021-11-29 PROCEDURE — 90853 GROUP PSYCHOTHERAPY: CPT | Mod: 95 | Performed by: COUNSELOR

## 2021-11-29 NOTE — GROUP NOTE
"Process Group Note    PATIENT'S NAME: Nimisha Jasso  MRN:   [id number]  :   1995  ACCT. NUMBER: [id number]  DATE OF SERVICE: 21  START TIME:  1:00 PM  END TIME:  1:50 PM  FACILITATOR: Tyra Ricci [facility]  TOPIC:  Process Group    Diagnoses:  296.32 (F33.1) Major Depressive Disorder, Recurrent Episode, Moderate  300.02 (F41.1) Generalized Anxiety Disorder      [facility] Mental [facility] Day Treatment  TRACK: 1B    NUMBER OF PARTICIPANTS: 6                                      Service Modality:  Video Visit     Telemedicine Visit: The patient's condition can be safely assessed and treated via synchronous audio and visual telemedicine encounter.      Reason for Telemedicine Visit: Services only offered telehealth    Originating Site (Patient Location): Patient's home    Distant Site (Provider Location): Provider Remote Setting- Home Office    Consent:  The patient/guardian has verbally consented to: the potential risks and benefits of telemedicine (video visit) versus in person care; bill my insurance or make self-payment for services provided; and responsibility for payment of non-covered services.     Patient would like the video invitation sent by:  My Chart    Mode of Communication:  Video Conference via Medical Zoom    As the provider I attest to compliance with applicable laws and regulations related to telemedicine.                Data:    Session content: At the start of this group, patients were invited to check in by identifying themselves, describing their current emotional status, and identifying issues to address in this group.   Area(s) of treatment focus addressed in this session included Symptom Management and Personal Safety.    Keith reported feeling tired and \"out of sorts\" today after the long weekend.  Their goal for the day is to \"get settled back in to things here at the house.\"  They reported they had a good time at their parents house this weekend and were able " to draw for the first time in months, which they enjoyed.  They took some time to reflect on their time in the program and the progress they have made.     Therapeutic Interventions/Treatment Strategies:  Psychotherapist offered support, feedback and validation and reinforced use of skills. Treatment modalities used include Motivational Interviewing, Cognitive Behavioral Therapy and Dialectical Behavioral Therapy. Interventions include Other: Assisted patient  in finding ways to adapt functioning in light of past traumatic experiences.    Assessment:    Patient response:   Patient responded to session by accepting feedback, giving feedback and listening    Possible barriers to participation / learning include: and no barriers identified    Health Issues:   None reported       Substance Use Review:   Substance Use: Substance use has decreased    Mental Status/Behavioral Observations  Appearance:   Appropriate   Eye Contact:   Good   Psychomotor Behavior: Normal   Attitude:   Cooperative   Orientation:   All  Speech   Rate / Production: Normal    Volume:  Normal   Mood:    Anxious   Affect:    Appropriate   Thought Content:   Clear  Thought Form:  Coherent  Logical     Insight:    Good     Plan:     Safety Plan: No current safety concerns identified.  Recommended that patient call 911 or go to the local ED should there be a change in any of these risk factors.     Barriers to treatment: None identified    Patient Contracts (see media tab):  None    Substance Use: Not addressed in session     Continue or Discharge: Patient will continue in Adult Day Treatment (ADT)  as planned. Patient is likely to benefit from learning and using skills as they work toward the goals identified in their treatment plan.      Tyra Ricci, [facility]  November 29, 2021

## 2021-11-29 NOTE — GROUP NOTE
Psychoeducation Group Note    PATIENT'S NAME: Nimisha Jasso  MRN:   [id number]  :   1995  ACCT. NUMBER: [id number]  DATE OF SERVICE: 21  START TIME:  3:00 PM  END TIME:  3:50 PM  FACILITATOR: Tahira Lam RN  TOPIC: VANESSA RN Group: Brain Health                                    Service Modality:  Video Visit     Telemedicine Visit: The patient's condition can be safely assessed and treated via synchronous audio and visual telemedicine encounter.      Reason for Telemedicine Visit:  covid19    Originating Site (Patient Location): Patient's home    Distant Site (Provider Location): Provider Remote Setting- Home Office    Consent:  The patient/guardian has verbally consented to: the potential risks and benefits of telemedicine (video visit) versus in person care; bill my insurance or make self-payment for services provided; and responsibility for payment of non-covered services.     Patient would like the video invitation sent by:  My Chart    Mode of Communication:  Video Conference via Medical Zoom    As the provider I attest to compliance with applicable laws and regulations related to telemedicine.          [facility] Mental Health Day Treatment  TRACK: 1B    NUMBER OF PARTICIPANTS: 6    Summary of Group / Topics Discussed:  Brain Health:  Pathophysiology of Mood Disorders: Patients were educated on mood disorder etiology and neuroscience, risk factors, symptoms, and pharmacologic, psychotherapeutic, and complementary treatment options. Patients were guided on a discussion of mental, behavioral, and physical symptoms and shared their symptoms with the group.     Patient Session Goals / Objectives:  ? Described what mood disorders are and identified risk factors   ? Explained how chemical imbalances in the brain can cause symptoms and how medications work to reverse this imbalance   ? Identified and described pharmacologic, psychotherapeutic, and complementary treatment  options      Patient Participation / Response:  Fully participated with the group by sharing personal reflections / insights and openly received / provided feedback with other participants.    Demonstrated understanding of topics discussed through group discussion and participation and Identified / Expressed personal readiness to practice skills    Treatment Plan:  Patient has See Epic Treatment Plan - Patient is discharging.    Tahira Lam RN

## 2021-11-29 NOTE — GROUP NOTE
Psychoeducation Group Note    PATIENT'S NAME: Nimisha Jasso  MRN:   [id number]  :   1995  ACCT. NUMBER: [id number]  DATE OF SERVICE: 21  START TIME:  2:00 PM  END TIME:  2:50 PM  FACILITATOR: Nehemiah Freeman OTR/L  TOPIC: MH Life Skills Group: Resiliency Development                                    Service Modality:  Video Visit     Telemedicine Visit: The patient's condition can be safely assessed and treated via synchronous audio and visual telemedicine encounter.      Reason for Telemedicine Visit: Services only offered telehealth    Originating Site (Patient Location): Patient's home    Distant Site (Provider Location): Provider Remote Setting- Home Office    Consent:  The patient/guardian has verbally consented to: the potential risks and benefits of telemedicine (video visit) versus in person care; bill my insurance or make self-payment for services provided; and responsibility for payment of non-covered services.     Mode of Communication:  Video Conference via Medical Zoom    As the provider I attest to compliance with applicable laws and regulations related to telemedicine.       [facility] Mental Health Day Treatment  TRACK: 1B    NUMBER OF PARTICIPANTS: 6    Summary of Group / Topics Discussed:  Resiliency Development:  Coping Skills(Strategies to Improve Self-Confidence): Patients were taught how to identify stressors, signs of stress, coping skills, and prevention strategies for overall stress management.  Patients were given the opportunity to identify both ongoing and acute mental health symptoms and how to effectively manage these symptoms by developing an effective aftercare plan.  Patients increased awareness of community based resources.    Patient Session Goals / Objectives:    Identified how using coping skills can be used for symptom and stress management       Improved awareness of individualed symptoms and stressors and how to effectively cope      Established a relapse prevention plan to practice these skills in their own environments    Practiced and reflected on how to generalize taught skills to their everyday life        Patient Participation / Response:  Fully participated with the group by sharing personal reflections / insights and openly received / provided feedback with other participants.    Demonstrated understanding of content through handouts/group discussion , Verbalized understanding of content and Patient would benefit from additional opportunities to practice the content to be able to generalize it to their everyday life with increased intentionality, consistency, and efficacy in support of their psychiatric recovery    Treatment Plan:  Patient has See Epic Treatment Plan - Patient is discharging.    Nehemiah Freeman, OTR/L

## 2021-12-16 ENCOUNTER — TELEPHONE (OUTPATIENT)
Dept: BEHAVIORAL HEALTH | Facility: CLINIC | Age: 26
End: 2021-12-16
Payer: COMMERCIAL

## 2021-12-16 NOTE — TELEPHONE ENCOUNTER
Writer called Pt today to offer a start date in the once per week therapy group next week.  Writer left a message requesting they respond back by Monday, 12/20 at noon if interested in the spot.

## 2021-12-17 NOTE — TELEPHONE ENCOUNTER
Pt called back and left a message stating they are interested in starting in the clinic on Wednesday of next week.  Will call Pt back to confirm the start and schedule it.

## 2021-12-22 ENCOUNTER — HOSPITAL ENCOUNTER (OUTPATIENT)
Dept: BEHAVIORAL HEALTH | Facility: CLINIC | Age: 26
End: 2021-12-22
Attending: PSYCHIATRY & NEUROLOGY
Payer: COMMERCIAL

## 2021-12-22 PROCEDURE — 90853 GROUP PSYCHOTHERAPY: CPT | Mod: 95

## 2021-12-22 ASSESSMENT — ANXIETY QUESTIONNAIRES
7. FEELING AFRAID AS IF SOMETHING AWFUL MIGHT HAPPEN: SEVERAL DAYS
3. WORRYING TOO MUCH ABOUT DIFFERENT THINGS: MORE THAN HALF THE DAYS
GAD7 TOTAL SCORE: 15
4. TROUBLE RELAXING: NEARLY EVERY DAY
6. BECOMING EASILY ANNOYED OR IRRITABLE: MORE THAN HALF THE DAYS
2. NOT BEING ABLE TO STOP OR CONTROL WORRYING: MORE THAN HALF THE DAYS
7. FEELING AFRAID AS IF SOMETHING AWFUL MIGHT HAPPEN: SEVERAL DAYS
5. BEING SO RESTLESS THAT IT IS HARD TO SIT STILL: NEARLY EVERY DAY
GAD7 TOTAL SCORE: 15
1. FEELING NERVOUS, ANXIOUS, OR ON EDGE: MORE THAN HALF THE DAYS
GAD7 TOTAL SCORE: 15

## 2021-12-22 NOTE — GROUP NOTE
Process Group Note    PATIENT'S NAME: Nimisha Jasso  MRN:   [id number]  :   1995  ACCT. NUMBER: [id number]  DATE OF SERVICE: 21  START TIME:  1:00 PM  END TIME: 2:00 pm  FACILITATOR: Meri Norris  TOPIC:  Process Group    Diagnoses:  296.32 (F33.1) Major Depressive Disorder, Recurrent Episode, Moderate  300.02 (F41.1) Generalized Anxiety Disorder      [facility] Day Treatment  TRACK: clinic 1+                                      Service Modality:  Video Visit     Telemedicine Visit: The patient's condition can be safely assessed and treated via synchronous audio and visual telemedicine encounter.      Reason for Telemedicine Visit: Services only offered telehealth    Originating Site (Patient Location): Patient's home    Distant Site (Provider Location): Provider Remote Setting- Home Office    Consent:  The patient/guardian has verbally consented to: the potential risks and benefits of telemedicine (video visit) versus in person care; bill my insurance or make self-payment for services provided; and responsibility for payment of non-covered services.     Patient would like the video invitation sent by:  My Chart    Mode of Communication:  Video Conference via Medical Zoom    As the provider I attest to compliance with applicable laws and regulations related to telemedicine.          NUMBER OF PARTICIPANTS: 6          Data:    Session content: At the start of this group, patients were invited to check in by identifying themselves, describing their current emotional status, and identifying issues to address in this group.   Area(s) of treatment focus addressed in this session included Symptom Management, Personal Safety, Develop / Improve Independent Living Skills and Develop Socialization / Interpersonal Relationship Skills.  Keith reported feeling tired and overwhelmed.  THey left after first hour due to high anxiety.  They reported poor sleep and this may be  related to increasing a med.  They reported having plans with roommates they were looking forward to that fell through and is now dissappointed.  They hope to find time with roommates after group.  They reported No SI or CD issues.  They reported drinking a beer the other night.  They feel grateful for increased communication with roommates.     Therapeutic Interventions/Treatment Strategies:  Psychotherapist offered support, feedback and validation and reinforced use of skills. Treatment modalities used include Motivational Interviewing, Cognitive Behavioral Therapy and Dialectical Behavioral Therapy.  Validated and normalized.  Highlighted and reinforced skills used; connected to positive outcomes.  Provided additional skill suggestions.  Aided in creating a plan to help work towards goals.      Assessment:    Patient response:   Patient responded to session by accepting feedback, giving feedback, listening, focusing on goals, being attentive and accepting support    Possible barriers to participation / learning include: and no barriers identified    Health Issues:   Yes: Sleep disturbance, Associated Psychological Distress       Substance Use Review:   Substance Use: alcohol .  and Last use: one beer    Mental Status/Behavioral Observations  Appearance:   Appropriate   Eye Contact:   Good   Psychomotor Behavior: Normal   Attitude:   Cooperative   Orientation:   All  Speech   Rate / Production: Normal    Volume:  Normal   Mood:    Anxious  Depressed   Affect:    Appropriate   Thought Content:   Rumination  Thought Form:  Coherent  Logical     Insight:    Good     Plan:     Safety Plan: No current safety concerns identified.  Recommended that patient call 911 or go to the local ED should there be a change in any of these risk factors.     Barriers to treatment: None identified    Patient Contracts (see media tab):  None    Substance Use: Not addressed in session     Continue or Discharge: Patient will continue in  Adult Day Treatment (ADT)  as planned. Patient is likely to benefit from learning and using skills as they work toward the goals identified in their treatment plan.      Meri Norris  December 22, 2021

## 2021-12-23 ASSESSMENT — ANXIETY QUESTIONNAIRES: GAD7 TOTAL SCORE: 15

## 2022-02-02 DIAGNOSIS — E55.9 VITAMIN D DEFICIENCY: ICD-10-CM

## 2022-02-02 DIAGNOSIS — F64.9 GENDER IDENTITY DISORDER: ICD-10-CM

## 2022-02-02 DIAGNOSIS — F33.2 SEVERE RECURRENT MAJOR DEPRESSION WITHOUT PSYCHOTIC FEATURES (H): Primary | ICD-10-CM

## 2022-02-02 DIAGNOSIS — F41.9 ANXIETY DISORDER: ICD-10-CM

## 2022-02-04 ENCOUNTER — LAB (OUTPATIENT)
Dept: LAB | Facility: CLINIC | Age: 27
End: 2022-02-04
Payer: COMMERCIAL

## 2022-02-04 DIAGNOSIS — F64.9 GENDER IDENTITY DISORDER: ICD-10-CM

## 2022-02-04 DIAGNOSIS — F33.2 SEVERE RECURRENT MAJOR DEPRESSION WITHOUT PSYCHOTIC FEATURES (H): ICD-10-CM

## 2022-02-04 DIAGNOSIS — E55.9 VITAMIN D DEFICIENCY: ICD-10-CM

## 2022-02-04 DIAGNOSIS — F41.1 GENERALIZED ANXIETY DISORDER: ICD-10-CM

## 2022-02-04 DIAGNOSIS — F41.9 ANXIETY DISORDER: ICD-10-CM

## 2022-02-04 LAB
BASOPHILS # BLD AUTO: 0 10E3/UL (ref 0–0.2)
BASOPHILS NFR BLD AUTO: 0 %
EOSINOPHIL # BLD AUTO: 0.1 10E3/UL (ref 0–0.7)
EOSINOPHIL NFR BLD AUTO: 2 %
ERYTHROCYTE [DISTWIDTH] IN BLOOD BY AUTOMATED COUNT: 11.7 % (ref 10–15)
HCT VFR BLD AUTO: 37.5 % (ref 35–53)
HGB BLD-MCNC: 12.6 G/DL (ref 11.7–17.7)
IMM GRANULOCYTES # BLD: 0 10E3/UL
IMM GRANULOCYTES NFR BLD: 0 %
LYMPHOCYTES # BLD AUTO: 1.7 10E3/UL (ref 0.8–5.3)
LYMPHOCYTES NFR BLD AUTO: 30 %
MCH RBC QN AUTO: 30.2 PG (ref 26.5–33)
MCHC RBC AUTO-ENTMCNC: 33.6 G/DL (ref 31.5–36.5)
MCV RBC AUTO: 90 FL (ref 78–100)
MONOCYTES # BLD AUTO: 0.3 10E3/UL (ref 0–1.3)
MONOCYTES NFR BLD AUTO: 6 %
NEUTROPHILS # BLD AUTO: 3.5 10E3/UL (ref 1.6–8.3)
NEUTROPHILS NFR BLD AUTO: 62 %
PLATELET # BLD AUTO: 306 10E3/UL (ref 150–450)
RBC # BLD AUTO: 4.17 10E6/UL (ref 3.8–5.9)
WBC # BLD AUTO: 5.6 10E3/UL (ref 4–11)

## 2022-02-04 PROCEDURE — 83036 HEMOGLOBIN GLYCOSYLATED A1C: CPT

## 2022-02-04 PROCEDURE — 82746 ASSAY OF FOLIC ACID SERUM: CPT

## 2022-02-04 PROCEDURE — 36415 COLL VENOUS BLD VENIPUNCTURE: CPT

## 2022-02-04 PROCEDURE — 80061 LIPID PANEL: CPT

## 2022-02-04 PROCEDURE — 80050 GENERAL HEALTH PANEL: CPT

## 2022-02-04 PROCEDURE — 82306 VITAMIN D 25 HYDROXY: CPT

## 2022-02-04 PROCEDURE — 82607 VITAMIN B-12: CPT

## 2022-02-05 LAB
ALBUMIN SERPL-MCNC: 4 G/DL (ref 3.5–5)
ALP SERPL-CCNC: 73 U/L (ref 45–120)
ALT SERPL W P-5'-P-CCNC: 17 U/L (ref 0–45)
ANION GAP SERPL CALCULATED.3IONS-SCNC: 10 MMOL/L (ref 5–18)
AST SERPL W P-5'-P-CCNC: 16 U/L (ref 0–40)
BILIRUB SERPL-MCNC: 0.9 MG/DL (ref 0–1)
BUN SERPL-MCNC: 10 MG/DL (ref 8–22)
CALCIUM SERPL-MCNC: 9.3 MG/DL (ref 8.5–10.5)
CHLORIDE BLD-SCNC: 105 MMOL/L (ref 98–107)
CHOLEST SERPL-MCNC: 178 MG/DL
CO2 SERPL-SCNC: 25 MMOL/L (ref 22–31)
CREAT SERPL-MCNC: 0.66 MG/DL (ref 0.6–1.3)
FASTING STATUS PATIENT QL REPORTED: YES
FOLATE SERPL-MCNC: 15.9 NG/ML
GFR SERPL CREATININE-BSD FRML MDRD: >90 ML/MIN/1.73M2
GLUCOSE BLD-MCNC: 106 MG/DL (ref 70–125)
HBA1C MFR BLD: 5.2 %
HDLC SERPL-MCNC: 58 MG/DL
LDLC SERPL CALC-MCNC: 109 MG/DL
POTASSIUM BLD-SCNC: 4 MMOL/L (ref 3.5–5)
PROT SERPL-MCNC: 7 G/DL (ref 6–8)
SODIUM SERPL-SCNC: 140 MMOL/L (ref 136–145)
TRIGL SERPL-MCNC: 57 MG/DL
TSH SERPL DL<=0.005 MIU/L-ACNC: 1.19 UIU/ML (ref 0.3–5)
VIT B12 SERPL-MCNC: 382 PG/ML (ref 213–816)

## 2022-02-07 LAB — DEPRECATED CALCIDIOL+CALCIFEROL SERPL-MC: 55 UG/L (ref 30–80)

## 2022-06-16 ENCOUNTER — TELEPHONE (OUTPATIENT)
Dept: PSYCHIATRY | Facility: CLINIC | Age: 27
End: 2022-06-16

## 2022-07-24 ENCOUNTER — HEALTH MAINTENANCE LETTER (OUTPATIENT)
Age: 27
End: 2022-07-24

## 2022-08-05 ENCOUNTER — OFFICE VISIT (OUTPATIENT)
Dept: FAMILY MEDICINE | Facility: CLINIC | Age: 27
End: 2022-08-05
Payer: COMMERCIAL

## 2022-08-05 VITALS
WEIGHT: 165.1 LBS | DIASTOLIC BLOOD PRESSURE: 68 MMHG | HEART RATE: 88 BPM | BODY MASS INDEX: 27.47 KG/M2 | SYSTOLIC BLOOD PRESSURE: 116 MMHG

## 2022-08-05 DIAGNOSIS — F41.1 GENERALIZED ANXIETY DISORDER: ICD-10-CM

## 2022-08-05 DIAGNOSIS — T30.0 BURN: ICD-10-CM

## 2022-08-05 DIAGNOSIS — F33.1 MODERATE EPISODE OF RECURRENT MAJOR DEPRESSIVE DISORDER (H): ICD-10-CM

## 2022-08-05 DIAGNOSIS — I49.9 IRREGULAR HEART BEAT: Primary | ICD-10-CM

## 2022-08-05 DIAGNOSIS — G47.00 INSOMNIA, UNSPECIFIED TYPE: ICD-10-CM

## 2022-08-05 PROBLEM — L70.0 ACNE VULGARIS: Status: RESOLVED | Noted: 2017-11-03 | Resolved: 2022-08-05

## 2022-08-05 PROCEDURE — 93005 ELECTROCARDIOGRAM TRACING: CPT | Performed by: NURSE PRACTITIONER

## 2022-08-05 PROCEDURE — 96127 BRIEF EMOTIONAL/BEHAV ASSMT: CPT | Performed by: NURSE PRACTITIONER

## 2022-08-05 PROCEDURE — 99214 OFFICE O/P EST MOD 30 MIN: CPT | Performed by: NURSE PRACTITIONER

## 2022-08-05 PROCEDURE — 93010 ELECTROCARDIOGRAM REPORT: CPT | Performed by: INTERNAL MEDICINE

## 2022-08-05 RX ORDER — BUSPIRONE HYDROCHLORIDE 30 MG/1
30 TABLET ORAL 2 TIMES DAILY
COMMUNITY
Start: 2022-07-12

## 2022-08-05 RX ORDER — DULOXETIN HYDROCHLORIDE 20 MG/1
CAPSULE, DELAYED RELEASE ORAL
COMMUNITY
Start: 2022-03-26

## 2022-08-05 RX ORDER — QUETIAPINE FUMARATE 25 MG/1
25 TABLET, FILM COATED ORAL PRN
COMMUNITY
Start: 2022-06-16

## 2022-08-05 RX ORDER — TRAZODONE HYDROCHLORIDE 50 MG/1
50 TABLET, FILM COATED ORAL AT BEDTIME
Qty: 30 TABLET | Refills: 0 | Status: SHIPPED | OUTPATIENT
Start: 2022-08-05

## 2022-08-05 RX ORDER — HYDROXYZINE HYDROCHLORIDE 25 MG/1
25 TABLET, FILM COATED ORAL EVERY 6 HOURS PRN
COMMUNITY
Start: 2022-06-14

## 2022-08-05 RX ORDER — DULOXETIN HYDROCHLORIDE 60 MG/1
CAPSULE, DELAYED RELEASE ORAL
COMMUNITY
Start: 2022-07-27

## 2022-08-05 RX ORDER — DULOXETIN HYDROCHLORIDE 30 MG/1
CAPSULE, DELAYED RELEASE ORAL
COMMUNITY
Start: 2022-07-27

## 2022-08-05 ASSESSMENT — ANXIETY QUESTIONNAIRES
GAD7 TOTAL SCORE: 19
6. BECOMING EASILY ANNOYED OR IRRITABLE: MORE THAN HALF THE DAYS
7. FEELING AFRAID AS IF SOMETHING AWFUL MIGHT HAPPEN: NEARLY EVERY DAY
GAD7 TOTAL SCORE: 19
4. TROUBLE RELAXING: MORE THAN HALF THE DAYS
7. FEELING AFRAID AS IF SOMETHING AWFUL MIGHT HAPPEN: NEARLY EVERY DAY
8. IF YOU CHECKED OFF ANY PROBLEMS, HOW DIFFICULT HAVE THESE MADE IT FOR YOU TO DO YOUR WORK, TAKE CARE OF THINGS AT HOME, OR GET ALONG WITH OTHER PEOPLE?: EXTREMELY DIFFICULT
1. FEELING NERVOUS, ANXIOUS, OR ON EDGE: NEARLY EVERY DAY
2. NOT BEING ABLE TO STOP OR CONTROL WORRYING: NEARLY EVERY DAY
IF YOU CHECKED OFF ANY PROBLEMS ON THIS QUESTIONNAIRE, HOW DIFFICULT HAVE THESE PROBLEMS MADE IT FOR YOU TO DO YOUR WORK, TAKE CARE OF THINGS AT HOME, OR GET ALONG WITH OTHER PEOPLE: EXTREMELY DIFFICULT
5. BEING SO RESTLESS THAT IT IS HARD TO SIT STILL: NEARLY EVERY DAY
3. WORRYING TOO MUCH ABOUT DIFFERENT THINGS: NEARLY EVERY DAY
GAD7 TOTAL SCORE: 19

## 2022-08-05 ASSESSMENT — PATIENT HEALTH QUESTIONNAIRE - PHQ9
SUM OF ALL RESPONSES TO PHQ QUESTIONS 1-9: 23
10. IF YOU CHECKED OFF ANY PROBLEMS, HOW DIFFICULT HAVE THESE PROBLEMS MADE IT FOR YOU TO DO YOUR WORK, TAKE CARE OF THINGS AT HOME, OR GET ALONG WITH OTHER PEOPLE: EXTREMELY DIFFICULT
SUM OF ALL RESPONSES TO PHQ QUESTIONS 1-9: 23

## 2022-08-05 NOTE — PROGRESS NOTES
Assessment & Plan     Irregular heart beat  EKG completed and personally reviewed as sinus tachycardia.  Suspect her poorly controlled anxiety and depression are contributing to current symptoms.  Suspect she is experiencing PACs or PVCs, although they were not captured on EKG today.  Discussed labs today to rule out other cause, however, she had normal labs about 6 months ago.  Patient declines today.  Ordered a Holter monitor to better capture these episodes.  I recommend continued follow up with her psychiatrist regarding her anxiety and depression.  She will take her PRN medication when she returns home today for her anxiety.  Refilled her Trazodone.  Suspect poor sleep is contributing to current anxiety.  Continues to have suicidal ideations without a specific plan.  I have recommended emergent care, but patient refuses.  Does not feel like they can offer her anything.  [facility]  She has crisis resources available to her.  I continued to recommend going to the ER or calling 911 with any worsening symptoms or suicidal plan.  Patient agrees with plan of care.   - EKG 12-lead, tracing only  - Adult Holter Monitor 24 hour    Insomnia, unspecified type  Refilled Trazodone.  She will follow up with her psychiatrist and/or PCP for any further refills.   - traZODone (DESYREL) 50 MG tablet  Dispense: 30 tablet; Refill: 0    Generalized anxiety disorder    Moderate episode of recurrent major depressive disorder (H)    Burn  This was cleanse and dressed with antibiotic ointment and a dry dressing.  Patient instructed to continue to leave the burn clean and dry.  She can leave open to air if not draining.  Continue to apply vaseline or aquaphor daily.      Depression Screening Follow Up    PHQ 8/5/2022   PHQ-9 Total Score 23   Q9: Thoughts of better off dead/self-harm past 2 weeks More than half the days   F/U: Thoughts of suicide or self-harm Yes  "  F/U: Self harm-plan Yes   F/U: Self-harm action No   F/U: Safety concerns No   Some encounter information is confidential and restricted. Go to Review Flowsheets activity to see all data.       Follow Up  Crisis resource information provided in the After Visit Summary  Referred patient back to mental health provider  ED visit recommended - patient refuses    Discussed the following ways the patient can remain in a safe environment:  be around others    Carolyne Jaocb NP  [facility]    Kaia Parker is a 27 year old who presents with concerns regarding heart palpitations.  She experiences an irregular heart beat that last a couple of minutes.  This usually only happens when she is laying down and about every-other day.  Sometimes happens with standing.  Has had some dizziness with this and a feeling like her \"head is swimming\".  She gets some chest pain that she correlates with her anxiety.  No SOB.    History of anxiety and depression.  She follows with a psychiatrist.  Her anxiety has been much worse.  Sleep has been poor secondary to being out of her Trazodone.  She has had a difficult time getting this refilled by her psychiatrist.  Reports suicidal ideation without a specific plan.  Thinks about self harm for coping.  Patient has PRN medication available to her, but has not used today due to having to come to appointment.  She lives alone.    Has a burn on her right arm that she would like looked at.  This happened on her oven.  She has been cleaning and applying antibiotic ointment.  No significant swelling or drainage. Denies any fevers.     History of Present Illness       Mental Health Follow-up:  Patient presents to follow-up on Depression & Anxiety.Patient's depression since last visit has been:  Worse  The patient is having other symptoms associated with depression.  Patient's anxiety since last visit has been:  Worse  The patient is having other symptoms associated " with anxiety.  Any significant life events: other  Patient is feeling anxious or having panic attacks.  Patient has no concerns about alcohol or drug use.    Reason for visit:  Irregular heart beat  Symptom onset:  More than a month    Keith Jasso eats 2-3 servings of fruits and vegetables daily.Keith Jasso consumes 1 sweetened beverage(s) daily.Keith Jasso exercises with enough effort to increase Keith Jasso's heart rate 20 to 29 minutes per day.  Keith Jasso exercises with enough effort to increase Keith Jasso's heart rate 3 or less days per week.   Keith Jasso is taking medications regularly.    Today's PHQ-9         PHQ-9 Total Score: 23    PHQ-9 Q9 Thoughts of better off dead/self-harm past 2 weeks :   More than half the days  Thoughts of suicide or self harm: (P) Yes  Self-harm Plan:   (P) Yes  Self-harm Action:     (P) No  Safety concerns for self or others: (P) No    How difficult have these problems made it for you to do your work, take care of things at home, or get along with other people: Extremely difficult  Today's BRISA-7 Score: 19      Review of Systems   Pertinent items in HPI      Objective    /68 (BP Location: Right arm, Patient Position: Sitting, Cuff Size: Adult Regular)   Pulse 88   Wt 74.9 kg (165 lb 1.6 oz)   BMI 27.47 kg/m    Body mass index is 27.47 kg/m .  Physical Exam   GENERAL: alert, anxious, uncomfortable  NECK: no adenopathy, no asymmetry, masses, or scars and thyroid normal to palpation  RESP: lungs clear to auscultation - no rales, rhonchi or wheezes  CV: regular rate and rhythm, normal S1 S2, no S3 or S4, no murmur, click or rub, no peripheral edema  PSYCH: mentation appears anxious

## 2022-08-08 LAB
ATRIAL RATE - MUSE: 102 BPM
DIASTOLIC BLOOD PRESSURE - MUSE: NORMAL MMHG
INTERPRETATION ECG - MUSE: NORMAL
P AXIS - MUSE: 55 DEGREES
PR INTERVAL - MUSE: 130 MS
QRS DURATION - MUSE: 82 MS
QT - MUSE: 346 MS
QTC - MUSE: 450 MS
R AXIS - MUSE: 58 DEGREES
SYSTOLIC BLOOD PRESSURE - MUSE: NORMAL MMHG
T AXIS - MUSE: 20 DEGREES
VENTRICULAR RATE- MUSE: 102 BPM

## 2022-08-24 ENCOUNTER — TELEPHONE (OUTPATIENT)
Dept: FAMILY MEDICINE | Facility: CLINIC | Age: 27
End: 2022-08-24

## 2022-08-24 NOTE — TELEPHONE ENCOUNTER
8-24-22  Pt called stated she was exposed to covid and is scheduled @ [facility] today 8-24-22 @ 11:10am for covid testing.  Pt stated she has [facility] insurance and are informed her covid testing is only covered if primary care writes [facility] a prescription for covid testing.  I stated we do covid testing here @ [facility] pt declined that offer.  I stated covid testing should be free across the board, pt stated it wasn't and [facility] needs a prescription.  I informed pt this script wont be done in 10min when her appt is.  Send script to: (pt doesn't have fax #)  [facility] [employer]  Phone# [phone]  [email or web address]

## 2022-09-16 ENCOUNTER — MYC MEDICAL ADVICE (OUTPATIENT)
Dept: FAMILY MEDICINE | Facility: CLINIC | Age: 27
End: 2022-09-16

## 2022-09-16 DIAGNOSIS — F33.2 SEVERE EPISODE OF RECURRENT MAJOR DEPRESSIVE DISORDER, WITHOUT PSYCHOTIC FEATURES (H): ICD-10-CM

## 2022-09-16 DIAGNOSIS — F41.1 GENERALIZED ANXIETY DISORDER: Primary | ICD-10-CM

## 2022-09-16 DIAGNOSIS — F33.1 MODERATE EPISODE OF RECURRENT MAJOR DEPRESSIVE DISORDER (H): ICD-10-CM

## 2022-09-16 DIAGNOSIS — R45.851 SUICIDAL IDEATION: ICD-10-CM

## 2022-10-02 ENCOUNTER — HEALTH MAINTENANCE LETTER (OUTPATIENT)
Age: 27
End: 2022-10-02

## 2022-10-17 ENCOUNTER — VIRTUAL VISIT (OUTPATIENT)
Dept: PSYCHIATRY | Facility: CLINIC | Age: 27
End: 2022-10-17
Payer: COMMERCIAL

## 2022-10-17 VITALS — WEIGHT: 160 LBS | HEIGHT: 65 IN | BODY MASS INDEX: 26.66 KG/M2

## 2022-10-17 DIAGNOSIS — F33.1 MAJOR DEPRESSIVE DISORDER, RECURRENT EPISODE, MODERATE (H): Primary | ICD-10-CM

## 2022-10-17 DIAGNOSIS — F41.1 GENERALIZED ANXIETY DISORDER: ICD-10-CM

## 2022-10-17 RX ORDER — ARIPIPRAZOLE 5 MG/1
5 TABLET ORAL DAILY
COMMUNITY
Start: 2022-10-06

## 2022-10-17 ASSESSMENT — PAIN SCALES - GENERAL: PAINLEVEL: NO PAIN (0)

## 2022-10-17 ASSESSMENT — PATIENT HEALTH QUESTIONNAIRE - PHQ9: SUM OF ALL RESPONSES TO PHQ QUESTIONS 1-9: 23
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
## 2022-11-02 ENCOUNTER — VIRTUAL VISIT (OUTPATIENT)
Dept: PSYCHIATRY | Facility: CLINIC | Age: 27
End: 2022-11-02
Payer: COMMERCIAL

## 2022-11-02 VITALS — WEIGHT: 160 LBS | BODY MASS INDEX: 26.66 KG/M2 | HEIGHT: 65 IN

## 2022-11-02 DIAGNOSIS — F33.1 MAJOR DEPRESSIVE DISORDER, RECURRENT EPISODE, MODERATE (H): Primary | ICD-10-CM

## 2022-11-02 ASSESSMENT — PATIENT HEALTH QUESTIONNAIRE - PHQ9: SUM OF ALL RESPONSES TO PHQ QUESTIONS 1-9: 25

## 2022-11-02 ASSESSMENT — PAIN SCALES - GENERAL: PAINLEVEL: NO PAIN (0)

## 2022-11-02 NOTE — PROGRESS NOTES
Keith is a 27 year old who is being evaluated via a billable video visit.      How would you like to obtain your AVS? MyChart  If the video visit is dropped, the invitation should be resent by: Send to e-mail at: [email or web address]  Will anyone else be joining your video visit? No       Tahira Rosas, NICHOLAS    Depression Response    Patient completed the PHQ-9 assessment for depression and scored >9? Yes  Question 9 on the PHQ-9 was positive for suicidality? Yes  Does patient have current mental health provider? Yes    Is this a virtual visit? Yes   Does patient have suicidal ideation (positive question 9)? Yes (adult) - transfer to Red Flag Triage ([phone]) Patient declined transfer.  Notify provider.     I personally notified the following: triage nurse (virtual visit)               Video-Visit Details    Video Start Time: 2:00 pm    Type of service:  Video Visit    Video End Time:2:52 pm    Originating Location (pt. Location): Home        Distant Location (provider location):  Off-site    Platform used for Video Visit: [employer]

## 2022-11-02 NOTE — NURSING NOTE
Patient said no changes to medications since last reviewed on 10/17/22.    Tahira Rosas, NICHOLAS    Patient scored 25 on PHQ-9, #9 2=more than half the days.     Tahira Rosas VF

## 2022-11-10 ENCOUNTER — OFFICE VISIT (OUTPATIENT)
Dept: PSYCHIATRY | Facility: CLINIC | Age: 27
End: 2022-11-10
Payer: COMMERCIAL

## 2022-11-10 VITALS
SYSTOLIC BLOOD PRESSURE: 123 MMHG | WEIGHT: 166.8 LBS | HEIGHT: 65 IN | TEMPERATURE: 98.8 F | HEART RATE: 108 BPM | BODY MASS INDEX: 27.79 KG/M2 | DIASTOLIC BLOOD PRESSURE: 79 MMHG

## 2022-11-10 DIAGNOSIS — F41.1 GENERALIZED ANXIETY DISORDER: ICD-10-CM

## 2022-11-10 DIAGNOSIS — F33.1 MAJOR DEPRESSIVE DISORDER, RECURRENT EPISODE, MODERATE (H): Primary | ICD-10-CM

## 2022-11-10 RX ORDER — LYSINE HCL 500 MG
500 TABLET ORAL DAILY
COMMUNITY

## 2022-11-10 ASSESSMENT — PATIENT HEALTH QUESTIONNAIRE - PHQ9: SUM OF ALL RESPONSES TO PHQ QUESTIONS 1-9: 25

## 2022-11-10 NOTE — PROGRESS NOTES
"Service Date: 11/02/2022     PHYSICIAN TRD PROGRAM MEDICATION THERAPY MANAGEMENT    This was a video visit from my home to the patient's home via [employer] due to COVID.  We used [employer], and in case we get interrupted we would use email, [email or web address]  Patient's SMS is [phone].    My student, Neetu De La Fuente, was present for observation and training purposes with the patient's permission.    Starting time was 2 p.m. and ending time was 2:52 p.m.    DICTATION IDENTIFIER:  NAME:  Nimisha Jasso L \"Keith\"  YOB: 1995  VIDEO VISIT:  11/02/2022.    IDENTIFYING INFORMATION AND INTRODUCTION:  Keith is a 27-year-old non-binary, born female, seen on a video visit today for an M Physician TRD MT consultation.    The patient lives in [place], [place], and the patient is a new adult to the  Physician TRD Program.    Psychiatrist is Dr. Keven Downey and she will see the patient on 11/20/2022 in person in clinic and this was communicated to the patient.    CHIEF COMPLAINTS:  Depression, anxiety, suicidal thoughts, struggles with identity and has a hard time getting to sleep.    REASON FOR CONSULTATION:  Rating medication trials for antidepressant failure and assessment of antidepressant drugs and their history.    Informants include the patient and review of past medical records.  Please be aware that I was not in the possession of all past medical mental records at time of visit.    Time spent was 1 hour without the patient and 52 minutes with the patient.    MEDICATION INFORMATION:      1.  Current Psychotropic Medications:  A.  Aripiprazole/Abilify 5 mg q.a.m.  The patient thinks she is more stable on it.  B.  Duloxetine/Cymbalta 120 mg at bedtime.  It is also used for pain and depression.  C.  Hydroxyzine p.r.n. 25 mg.  She uses it maybe once per week for anxiety.    D.  Trazodone 50 mg at bedtime.  Indication:  Sleep.  E.  Quetiapine p.r.n. 25 mg.  She is currently not taking " it, but she has it in case of anxiety.  F.  Buspirone/BuSpar 30 mg b.i.d. for a total of 60 mg per day.  Indication:  Anxiety.    2.  Concomitant Medications:  A.  Ibuprofen p.r.n. for pain.    3.  Herbal Remedies:  A.  Vitamin D3 2000 units per day.  B.  L-lysine 1000 mg b.i.d.   C.  Vitamin gummies.    4.  Drug/Drug Interactions:    A.  Trazodone and buspirone may result in increased risk of serotonin syndrome and increased risk of CNS depression.  B.  Duloxetine and trazodone or buspirone may result in increased risk of serotonin syndrome.   C. Aripiprazole and trazodone or hydroxyzine may result in increased risk of QT interval prolongation.  D.  Duloxetine and ethanol may result in increased risk of liver injury.    5.  Current Reported Side Effects:  Low sex drive with duloxetine.    6.  Gene Testing:  Yes was done, but the patient has no results yet.    7.  Allergies:  No known drug allergies.    PAST MEDICAL HISTORY:    1.  MDD.  2.  BRISA.  3.  Insomnia.  4.  Acne vulgaris.  5.  Non-binary, bisexual.  6.  Irregular heartbeat.  7.  Chronic pain, joint knees and hips.  8.  Self-injury behavior, cutting. It does happen but not regularly, maybe once every couple of months.  9.  Family mental health:  Grandfather bipolar and mother anxiety and depression.    DEPRESSION HISTORY:    1.  First time experienced in high school, around age 14.  2.  First time antidepressant drug started around age 18.  Drug is unknown.  3.  Last episode started:  When I asked them this question , they said it is nonstop.  4.  Hospitalization for severe suicidal ideation:  None but ED visits 05/05/2022, 06/15/2022 and 09/21/2022  5.  Treatments:  Outpatient treatment IOP 09/2021 to 11/2021.    6.  Suicidal ideation currently:  Passive thoughts, but no desire and no plan.  7.  Individual psychotherapy:  Patient has a counselor and it helps seeing a counselor.  They see the counselor twice per week.  8.  Cognitive behavioral therapy:   Negative.  9.  DBT:  Yes, it helps somewhat.  The individual setting helped somewhat, but not the group one.      SOCIAL AND ENVIRONMENTAL ASPECTS:  The patient lives alone.  They have an older brother.    PHARMACOTHERAPY INDICATORS:    1.  Economic Assessment:  The patient has MA.  Prescription drug copayment is between 0 to $3.  The cost of obtaining prescribed medications does not interfere with compliance.  2.  The patient's pharmacy is [facility].  3.  Compliance Assessment:  Patient is independent in medication administration.  They are a fair historian.  They use a pillbox, which is a weekly one.  They miss once per week or once every other week.  When I asked the patient to whom they turn if they are very depressed, they said parents. They try to distract themself, doing art work, and if nothing helps like calling the parents or therapist, or they will go to the ED.    4.  Habits and Chemical Use:  A.  Alcohol:  Once a week a beer or 1-2 glasses of wine.  B. Tobacco:  Never.  C.  Caffeine:  1-2 coffees a day.  D.  Recreational drugs:  Cannabis, weed use, smoking and edibles in college.  E.  Exercise:  They go for walks twice per week.  They were a high school athlete. Dancing gave them a lot of justine.  F.  Hobbies:  Art, playing Dungeons and Dragons, playing video games.    FRIENDSHIP:  Patient was with partner for 3-1/2 years, broke up in 05/2021 but still they are still best friends.    RATING OF ANTIDEPRESSANT DRUGS:  Antidepressant treatment history using antidepressant resistant rating.    1.  Selective serotonin reuptake inhibitors (SSRIs):  A.  Escitalopram/Lexapro in 2021, 10 mg.  B.  Fluoxetine/Prozac:  It might have been the first drug they ever tried.  C.  Paroxetine/Paxil was tried in 2021, up to 40 mg per day, rating of 4.  The patient stopped taking it, and each time when it was stopped, brain zaps occurred.  After the medication was discontinued, things went downhill.  D.  Sertraline/Zoloft, in 10/2021, 100 mg. Rating of 3.     2.  Serotonin noradrenaline reuptake inhibitors (SNRIs):    A. Duloxetine/Cymbalta in 2022 to present, 120 mg per day.  Sexual side effects.  Helped a little bit with pain.  It is used with augmentation and would give it a rating of 4.  B. Venlafaxine/Effexor could have been tried?      3.  Serotonin modulators and stimulators:  Negative.    4. Noradrenaline and dopamine reuptake inhibitors (NDRIs):  A.  Bupropion/Wellbutrin SR in 2021, 300 mg.  It helped with focusing, but anxiety increased tremendously. It would give a rating of 3.    5.  Tricyclic antidepressants (TCAs):  Negative.    6.  Tetracyclic antidepressant:  A.  Trazodone/Desyrel from 0732-6454.  Max dose 50 mg a day for sleep.  The patient was tired through the day.    7. Monoamine oxidase inhibitors (MAOIs):  Negative.    8.  Augmentation Therapy:  -- Antipsychotics:  A.  Aripiprazole/Abilify, max dose 5 mg since beginning of 2022 to present.  The patient is more stable.  B.  Quetiapine/Seroquel in 2022 was used.  50 mg for suicidal ideation and anxiety.    -- Stimulants:  Negative.    -- Benzodiazepines:  Negative.    9.  Miscellaneous:    A. Buspirone/BuSpar 60 mg per day between 2021 and present for anxiety.  B. Hydroxyzine/Atarax 50 mg was tried for anxiety.    C.  Propranolol was tried before. Patient experienced lightheadedness and unbalance.    10.  Miscellaneous sleep aids:    A.  Diphenhydramine/Benadryl:  Yes, it worked.  B.  Ambien/zolpidem: Yes.  C.  Melatonin:  Yes, but no change.    D.  Trazodone was tried.    11.  Ketamine treatment history:  Negative.    12.  Other reported treatments for depression and related mood disorder history:    A. TMS:  Negative.  B. ECT:  Negative.  C.  VNS:  Negative.  D. Bright lights:  Negative.  E. CPAP:  Negative.    COMMENTS:  1.  The patient will follow up with MTM as needed.  2.  All MTM findings will be shared with clinic psychiatrist.  3.  The  patient was instructed to return for upcoming appointment with Dr. Downey for recommendations and future treatment options.    Thank you for the opportunity to participate in the care of this patient.    Aimee Pavon, Waqar  Pharmaceutical Care Coordinator    Aimee Pavon, MaryD        D: 2022   T: 11/10/2022   MT: elizabeth    Name:     RENÉ AGUILAR  MRN:      [id number]        Account:      [id number]   :      1995           Service Date: 2022       Document: [id number]   [place] (NP; RN)  NP in Pediatrics  [id number] [place] [address], [address]  [place] [address]  Phone: [phone]  Fax: [phone]  Established Patient  Follow Up Time:
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
## 2022-12-21 ENCOUNTER — ALLIED HEALTH/NURSE VISIT (OUTPATIENT)
Dept: PSYCHIATRY | Facility: CLINIC | Age: 27
End: 2022-12-21
Payer: COMMERCIAL

## 2022-12-21 ENCOUNTER — OFFICE VISIT (OUTPATIENT)
Dept: PSYCHIATRY | Facility: CLINIC | Age: 27
End: 2022-12-21
Payer: COMMERCIAL

## 2022-12-21 DIAGNOSIS — F33.1 MAJOR DEPRESSIVE DISORDER, RECURRENT EPISODE, MODERATE (H): Primary | ICD-10-CM

## 2022-12-21 DIAGNOSIS — F33.2 SEVERE EPISODE OF RECURRENT MAJOR DEPRESSIVE DISORDER, WITHOUT PSYCHOTIC FEATURES (H): Primary | ICD-10-CM

## 2022-12-21 ASSESSMENT — PATIENT HEALTH QUESTIONNAIRE - PHQ9: SUM OF ALL RESPONSES TO PHQ QUESTIONS 1-9: 24

## 2022-12-21 NOTE — PROGRESS NOTES
TMS Education     Nimisha Jasso MRN# [id number]  Age: 27 year old year old YOB: 1995        Patient is here in clinic for TMS education and consenting.  Patient will be starting TMS today on the [place].  Writer and patient reviewed mechanics of TMS.  Discussed using magnetic pulses to stimulate and induce an electrical field inside the brain.  Discussed the difference between F8 and H1 coil.  Patient was able to verbalize understanding of this.  Discussed that the idea is that we are treating the DLPFC of the brain, as it has been shown by in studies that people who have depression have decreased activity in this part of the brain, the idea is that we stimulate this part of the brain to increase activity.       Reviewed processing of mapping and how visit today would go.  Encouraged patient to communicate with staff if treatment at anytime became painful.         Discussed side effects of TMS.  Side effects include headaches after treatment, hearing loss, lightheadedness/dizziness, short lived vision changes, and seizures.  Discussed ways that TMS Clinic helps with preventing these side effects.  Such as retesting motor thresholds and having patient wear ear plugs.  Instructed patient that they can take OTC pain relievers such as tylenol or IBU if they has a headache after today's treatment.  Reviewed safety concerns regarding sleep and use of illegal drugs/alcohol.        Patient was able to ask questions regarding procedure.  Patient informed of 10 minute late policy and attendance policy.  Consent was signed by patient today.

## 2022-12-21 NOTE — PROGRESS NOTES
Interventional Psychiatry Program  [address], [address]  [place] [address]  TMS Procedure Note   Nimisha Jasso MRN# [id number]  Age: 27 year old year old YOB: 1995    Pre-Procedure:  History and Physical: Reviewed in medical record  Consent Signed by: Nimisha Jasso for this course of treatment.  On: 12/21/2022    Reviewed possible side effects associated with treatment as well as questions regarding possible course of treatments.  Patient agreed to procedure and was able to reflect implications.    Nimisha Jasso comes into clinic today at the request of Keven Downey MD, ordering provider for TMS.    Clinical Narrative:  Patient presents to initiate an acute course of TMS for management of Keith Jasso's symptoms of resistant depression.    Indications for TMS:  MDD, recurrent, severe; 4+ medication trials (from 2+ classes) ineffective; Psychotherapy ineffective     Procedure Diagnosis:  Severe episode of recurrent major depressive disorder, without psychotic features (H)  (primary encounter diagnosis)    Treatment Hx:  Treatment number this series:  1 and Total lifetime treatment number: 1    No Known Allergies   There were no vitals taken for this visit.    Pause for the Cause  Right patient: Yes  Right procedure/correct coil:  Yes; rTMS; [id number]; H1 coil.   Earplugs in place:  Yes    Procedure  Patient was seated in procedure chair. Identity and procedure was verified. Ear plugs were placed in ears and patient-specific cap was placed on head and tightened appropriately. Ruler locations were verified. Bone conducting headphones were not used.  Coil was placed at 0 - eyebrow - 15 and stimulator was set to parameters below.  Initial train was well tolerated so 55 trains were delivered.  Patient tolerated procedure well with minimal facial movement.    Motor Threshold Determination  Distance from nasion to inion: 36.5  MT 1: 0 - 6 - 13 @ 52% on  12/21/2022    Stimulation Parameters:  Standard LDLPFC  Frequency: 18  Train Duration: 2  Total pulses delivered: 1980  Inter-train interval: 20  Tx Loc: 0 - eye - 15  Energy: 52% (100% MT)  Trains: 55  *Uses spacer*    Date MADRS QIDS PHQ-9   12/21/2022 38 22 24     Plan   - cont TMS     This service provided today was under the supervision of Darcy Bains MD, who completed the motor threshold determination and was available during treatment if needed.     Lance Wheatley, TMS Technician   [facility] Neuromodulation

## 2022-12-22 ENCOUNTER — OFFICE VISIT (OUTPATIENT)
Dept: PSYCHIATRY | Facility: CLINIC | Age: 27
End: 2022-12-22
Payer: COMMERCIAL

## 2022-12-22 DIAGNOSIS — F33.2 SEVERE EPISODE OF RECURRENT MAJOR DEPRESSIVE DISORDER, WITHOUT PSYCHOTIC FEATURES (H): Primary | ICD-10-CM

## 2022-12-22 ASSESSMENT — PATIENT HEALTH QUESTIONNAIRE - PHQ9: SUM OF ALL RESPONSES TO PHQ QUESTIONS 1-9: 27

## 2022-12-22 NOTE — PROGRESS NOTES
Interventional Psychiatry Program  [address], [address]  [place] [address]  TMS Procedure Note   Nimisha Jasso MRN# [id number]  Age: 27 year old year old YOB: 1995    Pre-Procedure:  History and Physical: Reviewed in medical record  Consent Signed by: Nimisha Jasso for this course of treatment.  On: 12/21/2022    Nimisha Jasso comes into clinic today at the request of Keven Downey MD, ordering provider for TMS.    Clinical Narrative:  Patient tolerated treatment and increase, reports minor headache after first treatment.    Indications for TMS:  MDD, recurrent, severe; 4+ medication trials (from 2+ classes) ineffective; Psychotherapy ineffective     Procedure Diagnosis:  Severe episode of recurrent major depressive disorder, without psychotic features (H)  (primary encounter diagnosis)    Treatment Hx:  Treatment this series: 2  Lifetime treatment: 2    No Known Allergies   There were no vitals taken for this visit.    Pause for the Cause  Right patient: Yes  Right procedure/correct coil:  Yes; rTMS; [id number]; H1 coil.   Earplugs in place:  Yes    Procedure  Patient was seated in procedure chair. Identity and procedure was verified. Ear plugs were placed in ears and patient-specific cap was placed on head and tightened appropriately. Ruler locations were verified. Bone conducting headphones were not used.  Coil was placed at 0 - eyebrow - 15 and stimulator was set to parameters below.  Initial train was well tolerated so 55 trains were delivered.  Patient tolerated procedure well with minimal facial movement.    Motor Threshold Determination  Distance from nasion to inion: 36.5  MT 1: 0 - 6 - 13 @ 52% on 12/21/2022    Stimulation Parameters:  Standard [facility]  Frequency: 18  Train Duration: 2  Total pulses delivered: 1980  Inter-train interval: 20  Tx Loc: 0 - eye - 15  Energy: 57% (110% MT)  Trains: 55  *Uses spacer*    Date MADRS QIDS PHQ-9   12/21/2022 38 22 24      Plan   - cont TMS     This service provided today was under the supervising provider of the day, BETH Flores MD, who was available if needed.     Lance Wheatley, TMS Technician   [facility] Neuromodulation

## 2022-12-23 ENCOUNTER — OFFICE VISIT (OUTPATIENT)
Dept: PSYCHIATRY | Facility: CLINIC | Age: 27
End: 2022-12-23
Payer: COMMERCIAL

## 2022-12-23 DIAGNOSIS — F33.2 SEVERE EPISODE OF RECURRENT MAJOR DEPRESSIVE DISORDER, WITHOUT PSYCHOTIC FEATURES (H): Primary | ICD-10-CM

## 2022-12-23 ASSESSMENT — PATIENT HEALTH QUESTIONNAIRE - PHQ9: SUM OF ALL RESPONSES TO PHQ QUESTIONS 1-9: 19

## 2022-12-23 NOTE — PROGRESS NOTES
Interventional Psychiatry Program  [address], [address]  [place] [address]  TMS Procedure Note   Nimisha Jasso MRN# [id number]  Age: 27 year old year old YOB: 1995    Pre-Procedure:  History and Physical: Reviewed in medical record  Consent Signed by: Nimisha Jasso for this course of treatment.  On: 12/21/2022    Nimisha Jasso comes into clinic today at the request of Keven Downey MD, ordering provider for TMS.    Clinical Narrative:  Patient tolerated treatment and increase, no changes reported.    Indications for TMS:  MDD, recurrent, severe; 4+ medication trials (from 2+ classes) ineffective; Psychotherapy ineffective     Procedure Diagnosis:  Severe episode of recurrent major depressive disorder, without psychotic features (H)  (primary encounter diagnosis)    Treatment Hx:  Treatment this series: 3  Lifetime treatment number: 3    No Known Allergies   There were no vitals taken for this visit.    Pause for the Cause  Right patient: Yes  Right procedure/correct coil:  Yes; rTMS; [id number]; H1 coil.   Earplugs in place:  Yes    Procedure  Patient was seated in procedure chair. Identity and procedure was verified. Ear plugs were placed in ears and patient-specific cap was placed on head and tightened appropriately. Ruler locations were verified. Bone conducting headphones were not used.  Coil was placed at 0 - eyebrow - 15 and stimulator was set to parameters below.  Initial train was well tolerated so 55 trains were delivered.  Patient tolerated procedure well with minimal facial movement.    Motor Threshold Determination  Distance from nasion to inion: 36.5  MT 1: 0 - 6 - 13 @ 52% on 12/21/2022    Stimulation Parameters:  Standard LDLPFC  Frequency: 18  Train Duration: 2  Total pulses delivered: 1980  Inter-train interval: 20  Tx Loc: 0 - eye - 15  Energy: 62% (120% MT)  Trains: 55  *Uses spacer*    Date MADRS QIDS PHQ-9   12/21/2022 38 22 24     Plan   - cont  TMS     This service provided today was under the supervising provider of the day, Thu Sharp MD, who was available if needed.     Lance Wheatley, TMS Technician   [facility] Neuromodulation

## 2022-12-26 ENCOUNTER — OFFICE VISIT (OUTPATIENT)
Dept: PSYCHIATRY | Facility: CLINIC | Age: 27
End: 2022-12-26
Payer: COMMERCIAL

## 2022-12-26 DIAGNOSIS — F33.2 SEVERE EPISODE OF RECURRENT MAJOR DEPRESSIVE DISORDER, WITHOUT PSYCHOTIC FEATURES (H): Primary | ICD-10-CM

## 2022-12-26 ASSESSMENT — PATIENT HEALTH QUESTIONNAIRE - PHQ9: SUM OF ALL RESPONSES TO PHQ QUESTIONS 1-9: 22

## 2022-12-26 NOTE — PROGRESS NOTES
Interventional Psychiatry Program  [address], [address]  [place] [address]  TMS Procedure Note   Nimisha Jasso MRN# [id number]  Age: 27 year old year old YOB: 1995    Pre-Procedure:  History and Physical: Reviewed in medical record  Consent Signed by: Nimisha Jasso for this course of treatment.  On: 12/21/2022    Nimisha Jasso comes into clinic today at the request of Keven Downey MD, ordering provider for TMS.    Clinical Narrative:  Patient tolerated treatment.  Patient reports some neck pain and tension headaches.    Indications for TMS:  MDD, recurrent, severe; 4+ medication trials (from 2+ classes) ineffective; Psychotherapy ineffective     Procedure Diagnosis:  Severe episode of recurrent major depressive disorder, without psychotic features (H)  (primary encounter diagnosis)    Treatment Hx:  Treatment this series: 4  Lifetime treatment number: 4    No Known Allergies   There were no vitals taken for this visit.    Pause for the Cause  Right patient: Yes  Right procedure/correct coil:  Yes; rTMS; [id number]; H1 coil.   Earplugs in place:  Yes    Procedure  Patient was seated in procedure chair. Identity and procedure was verified. Ear plugs were placed in ears and patient-specific cap was placed on head and tightened appropriately. Ruler locations were verified. Bone conducting headphones were not used.  Coil was placed at 0 - eyebrow - 15 and stimulator was set to parameters below.  Initial train was well tolerated so 55 trains were delivered.  Patient tolerated procedure well with minimal facial movement.    Motor Threshold Determination  Distance from nasion to inion: 36.5  MT 1: 0 - 6 - 13 @ 52% on 12/21/2022    Stimulation Parameters:  Standard LDLPFC  Frequency: 18  Train Duration: 2  Total pulses delivered: 1980  Inter-train interval: 20  Tx Loc: 0 - eye - 15  Energy: 62% (120% MT)  Trains: 55  *Uses spacer*    Date MADRS QIDS PHQ-9   12/21/2022 38 22  24     Plan   - cont TMS     This service provided today was under the supervising provider of the day, Thu Sharp MD, who was available if needed.     Romina Reid, Psychometrist  [facility] Neuromodulation

## 2022-12-27 ENCOUNTER — OFFICE VISIT (OUTPATIENT)
Dept: PSYCHIATRY | Facility: CLINIC | Age: 27
End: 2022-12-27
Payer: COMMERCIAL

## 2022-12-27 DIAGNOSIS — F33.2 SEVERE EPISODE OF RECURRENT MAJOR DEPRESSIVE DISORDER, WITHOUT PSYCHOTIC FEATURES (H): Primary | ICD-10-CM

## 2022-12-27 ASSESSMENT — PATIENT HEALTH QUESTIONNAIRE - PHQ9: SUM OF ALL RESPONSES TO PHQ QUESTIONS 1-9: 23

## 2022-12-27 NOTE — PROGRESS NOTES
Interventional Psychiatry Program  [address], [address]  [place] [address]  TMS Procedure Note   Nimisha Jasso MRN# [id number]  Age: 27 year old year old YOB: 1995    Pre-Procedure:  History and Physical: Reviewed in medical record  Consent Signed by: Nimisha Jasso for this course of treatment.  On: 12/21/2022    Nimisha Jasso comes into clinic today at the request of Keven Downey MD, ordering provider for TMS.    Clinical Narrative:  Patient tolerated treatment.  Patient reports no changes.    Indications for TMS:  MDD, recurrent, severe; 4+ medication trials (from 2+ classes) ineffective; Psychotherapy ineffective     Procedure Diagnosis:  Severe episode of recurrent major depressive disorder, without psychotic features (H)  (primary encounter diagnosis)    Treatment Hx:  Treatment this series: 5  Lifetime treatment number: 5    No Known Allergies   There were no vitals taken for this visit.    Pause for the Cause  Right patient: Yes  Right procedure/correct coil:  Yes; rTMS; [id number]; H1 coil.   Earplugs in place:  Yes    Procedure  Patient was seated in procedure chair. Identity and procedure was verified. Ear plugs were placed in ears and patient-specific cap was placed on head and tightened appropriately. Ruler locations were verified. Bone conducting headphones were not used.  Coil was placed at 0 - eyebrow - 15 and stimulator was set to parameters below.  Initial train was well tolerated so 55 trains were delivered.  Patient tolerated procedure well with minimal facial movement.    Motor Threshold Determination  Distance from nasion to inion: 36.5  MT 1: 0 - 6 - 13 @ 52% on 12/21/2022    [facility]  [facility]  Frequency: 18  Train Duration: 2  Total pulses delivered: 1980  Inter-train interval: 20  Tx Loc: 0 - eye - 15  Energy: 62% (120% MT)  Trains: 55  *Uses spacer*    Date MADRS QIDS PHQ-9   12/21/2022 38 22 24     Plan   - cont  TMS     This service provided today was under the supervising provider of the day, BETH Flores MD, who was available if needed.     Lance Wheatley, TMS Technician  [facility] Neuromodulation

## 2022-12-28 ENCOUNTER — OFFICE VISIT (OUTPATIENT)
Dept: PSYCHIATRY | Facility: CLINIC | Age: 27
End: 2022-12-28
Payer: COMMERCIAL

## 2022-12-28 DIAGNOSIS — F33.2 SEVERE EPISODE OF RECURRENT MAJOR DEPRESSIVE DISORDER, WITHOUT PSYCHOTIC FEATURES (H): Primary | ICD-10-CM

## 2022-12-28 ASSESSMENT — PATIENT HEALTH QUESTIONNAIRE - PHQ9: SUM OF ALL RESPONSES TO PHQ QUESTIONS 1-9: 23

## 2022-12-28 NOTE — PROGRESS NOTES
Interventional Psychiatry Program  [address], [address]  [place] [address]  TMS Procedure Note   Nimisha Jasso MRN# [id number]  Age: 27 year old year old YOB: 1995    Pre-Procedure:  History and Physical: Reviewed in medical record  Consent Signed by: Nimisha Jasso for this course of treatment.  On: 12/21/2022    Nimisha Jasso comes into clinic today at the request of Keven Downey MD, ordering provider for TMS.    Clinical Narrative:  Patient tolerated treatment.  Patient reports no changes.    Indications for TMS:  MDD, recurrent, severe; 4+ medication trials (from 2+ classes) ineffective; Psychotherapy ineffective     Procedure Diagnosis:  Severe episode of recurrent major depressive disorder, without psychotic features (H)  (primary encounter diagnosis)    Treatment Hx:  Treatment this series: 6  Lifetime treatment number: 6    No Known Allergies   There were no vitals taken for this visit.    Pause for the Cause  Right patient: Yes  Right procedure/correct coil:  Yes; rTMS; [id number]; H1 coil.   Earplugs in place:  Yes    Procedure  Patient was seated in procedure chair. Identity and procedure was verified. Ear plugs were placed in ears and patient-specific cap was placed on head and tightened appropriately. Ruler locations were verified. Bone conducting headphones were not used.  Coil was placed at 0 - eyebrow - 15 and stimulator was set to parameters below.  Initial train was well tolerated so 55 trains were delivered.  Patient tolerated procedure well with minimal facial movement.    Motor Threshold Determination  Distance from nasion to inion: 36.5  MT 1: 0 - 6 - 13 @ 52% on 12/21/2022    Stimulation Parameters:  Standard LDLPFC  Frequency: 18  Train Duration: 2  Total pulses delivered: 1980  Inter-train interval: 20  Tx Loc: 0 - eye - 15  Energy: 62% (120% MT)  Trains: 55  *Uses spacer*    Date MADRS QIDS PHQ-9   12/21/2022 38 22 24     Plan   - cont  TMS     This service provided today was under the supervising provider of the day, Thu Sharp MD, who was available if needed.     Lance Wheatley, TMS Technician  [facility] Neuromodulation

## 2022-12-29 ENCOUNTER — OFFICE VISIT (OUTPATIENT)
Dept: PSYCHIATRY | Facility: CLINIC | Age: 27
End: 2022-12-29
Payer: COMMERCIAL

## 2022-12-29 DIAGNOSIS — F33.2 SEVERE EPISODE OF RECURRENT MAJOR DEPRESSIVE DISORDER, WITHOUT PSYCHOTIC FEATURES (H): Primary | ICD-10-CM

## 2022-12-29 ASSESSMENT — PATIENT HEALTH QUESTIONNAIRE - PHQ9: SUM OF ALL RESPONSES TO PHQ QUESTIONS 1-9: 20

## 2022-12-29 NOTE — PROGRESS NOTES
Interventional Psychiatry Program  [address], [address]  [place] [address]  TMS Procedure Note   Nimisha Jasso MRN# [id number]  Age: 27 year old year old YOB: 1995    Pre-Procedure:  History and Physical: Reviewed in medical record  Consent Signed by: Nimisha Jasso for this course of treatment.  On: 12/21/2022    Nimisha Jasso comes into clinic today at the request of Keven Downey MD, ordering provider for TMS.    Clinical Narrative:  Patient tolerated treatment.  Patient reports no changes.    Indications for TMS:  MDD, recurrent, severe; 4+ medication trials (from 2+ classes) ineffective; Psychotherapy ineffective     Procedure Diagnosis:  Severe episode of recurrent major depressive disorder, without psychotic features (H)  (primary encounter diagnosis)    Treatment Hx:  Treatment this series: 7  Lifetime treatment number: 7    No Known Allergies   There were no vitals taken for this visit.    Pause for the Cause  Right patient: Yes  Right procedure/correct coil:  Yes; rTMS; [id number]; H1 coil.   Earplugs in place:  Yes    Procedure  Patient was seated in procedure chair. Identity and procedure was verified. Ear plugs were placed in ears and patient-specific cap was placed on head and tightened appropriately. Ruler locations were verified. Bone conducting headphones were not used.  Coil was placed at 0 - eyebrow - 15 and stimulator was set to parameters below.  Initial train was well tolerated so 55 trains were delivered.  Patient tolerated procedure well with minimal facial movement.    Motor Threshold Determination  Distance from nasion to inion: 36.5  MT 1: 0 - 6 - 13 @ 52% on 12/21/2022    Stimulation Parameters:  Standard LDLPFC  Frequency: 18  Train Duration: 2  Total pulses delivered: 1980  Inter-train interval: 20  Tx Loc: 0 - eye - 15  Energy: 62% (120% MT)  Trains: 55  *Uses spacer*    Date MADRS QIDS PHQ-9   12/21/2022 38 22 24     Plan   - cont  TMS     This service provided today was under the supervising provider of the day, Eva Flores MD, who was available if needed.     Lance Wheatley, TMS Technician  [facility] Neuromodulation

## 2022-12-30 ENCOUNTER — OFFICE VISIT (OUTPATIENT)
Dept: PSYCHIATRY | Facility: CLINIC | Age: 27
End: 2022-12-30
Payer: COMMERCIAL

## 2022-12-30 DIAGNOSIS — F33.2 SEVERE EPISODE OF RECURRENT MAJOR DEPRESSIVE DISORDER, WITHOUT PSYCHOTIC FEATURES (H): Primary | ICD-10-CM

## 2022-12-30 ASSESSMENT — PATIENT HEALTH QUESTIONNAIRE - PHQ9: SUM OF ALL RESPONSES TO PHQ QUESTIONS 1-9: 20

## 2022-12-30 NOTE — PROGRESS NOTES
Interventional Psychiatry Program  [address], [address]  [place] [address]  TMS Procedure Note   Nimisha Jasso MRN# [id number]  Age: 27 year old year old YOB: 1995    Pre-Procedure:  History and Physical: Reviewed in medical record  Consent Signed by: Nimisha Jasso for this course of treatment.  On: 12/21/2022    Nimisha Jasso comes into clinic today at the request of Keven Downey MD, ordering provider for TMS.    Clinical Narrative:  Patient tolerated treatment.  Patient reports no changes.    Indications for TMS:  MDD, recurrent, severe; 4+ medication trials (from 2+ classes) ineffective; Psychotherapy ineffective     Procedure Diagnosis:  Severe episode of recurrent major depressive disorder, without psychotic features (H)  (primary encounter diagnosis)    Treatment Hx:  Treatment this series: 8  Lifetime treatment number: 8    No Known Allergies   There were no vitals taken for this visit.    Pause for the Cause  Right patient: Yes  Right procedure/correct coil:  Yes; rTMS; [id number]; H1 coil.   Earplugs in place:  Yes    Procedure  Patient was seated in procedure chair. Identity and procedure was verified. Ear plugs were placed in ears and patient-specific cap was placed on head and tightened appropriately. Ruler locations were verified. Bone conducting headphones were not used.  Coil was placed at 0 - eyebrow - 15 and stimulator was set to parameters below.  Initial train was well tolerated so 55 trains were delivered.  Patient tolerated procedure well with minimal facial movement.    Motor Threshold Determination  Distance from nasion to inion: 36.5  MT 1: 0 - 6 - 13 @ 52% on 12/21/2022    Stimulation Parameters:  Standard [facility]  Frequency: 18  Train Duration: 2  Total pulses delivered: 1980  Inter-train interval: 20  Tx Loc: 0 - eye - 15  Energy: 62% (120% MT)  Trains: 55  *Uses spacer*    Date MADRS QIDS PHQ-9   12/21/2022 38 22 24   12/30/2022  22 20      Plan   - cont TMS     This service provided today was under the supervising provider of the day, Thu Sharp MD, who was available if needed.     Lance Wheatley, TMS Technician  [facility] Neuromodulation

## 2023-01-02 ENCOUNTER — OFFICE VISIT (OUTPATIENT)
Dept: PSYCHIATRY | Facility: CLINIC | Age: 28
End: 2023-01-02
Payer: COMMERCIAL

## 2023-01-02 DIAGNOSIS — F33.2 SEVERE EPISODE OF RECURRENT MAJOR DEPRESSIVE DISORDER, WITHOUT PSYCHOTIC FEATURES (H): Primary | ICD-10-CM

## 2023-01-02 ASSESSMENT — PATIENT HEALTH QUESTIONNAIRE - PHQ9: SUM OF ALL RESPONSES TO PHQ QUESTIONS 1-9: 21

## 2023-01-02 NOTE — PROGRESS NOTES
Interventional Psychiatry Program  [address], [address]  [place] [address]  TMS Procedure Note   Nimisha Jasso MRN# [id number]  Age: 27 year old year old YOB: 1995    Pre-Procedure:  History and Physical: Reviewed in medical record  Consent Signed by: Nimisha Jasso for this course of treatment.  On: 12/21/2022    Nimisha Jasso comes into clinic today at the request of Keven Downey MD, ordering provider for TMS.    Clinical Narrative:  Patient tolerated treatment.  Patient reports having a good New Years.     Indications for TMS:  MDD, recurrent, severe; 4+ medication trials (from 2+ classes) ineffective; Psychotherapy ineffective     Procedure Diagnosis:  Severe episode of recurrent major depressive disorder, without psychotic features (H)  (primary encounter diagnosis)    Treatment Hx:  Treatment this series: 9  Lifetime treatment number: 9    No Known Allergies   There were no vitals taken for this visit.    Pause for the Cause  Right patient: Yes  Right procedure/correct coil:  Yes; rTMS; [id number]; H1 coil.   Earplugs in place:  Yes    Procedure  Patient was seated in procedure chair. Identity and procedure was verified. Ear plugs were placed in ears and patient-specific cap was placed on head and tightened appropriately. Ruler locations were verified. Bone conducting headphones were not used.  Coil was placed at 0 - eyebrow - 15 and stimulator was set to parameters below.  Initial train was well tolerated so 55 trains were delivered.  Patient tolerated procedure well with minimal facial movement.    Motor Threshold Determination  Distance from nasion to inion: 36.5  MT 1: 0 - 6 - 13 @ 52% on 12/21/2022    Stimulation Parameters:  Standard LDLPFC  Frequency: 18  Train Duration: 2  Total pulses delivered: 1980  Inter-train interval: 20  Tx Loc: 0 - eye - 15  Energy: 62% (120% MT)  Trains: 55  *Uses spacer*    Date MADRS QIDS PHQ-9   12/21/2022 38 22 24    12/30/2022  22 20     Plan   - cont TMS     This service provided today was under the supervising provider of the day, Keven Downey MD, who was available if needed.     Jessica Bermudez RN  [facility] Neuromodulation

## 2023-01-05 ENCOUNTER — OFFICE VISIT (OUTPATIENT)
Dept: PSYCHIATRY | Facility: CLINIC | Age: 28
End: 2023-01-05
Payer: COMMERCIAL

## 2023-01-05 DIAGNOSIS — F33.2 SEVERE EPISODE OF RECURRENT MAJOR DEPRESSIVE DISORDER, WITHOUT PSYCHOTIC FEATURES (H): Primary | ICD-10-CM

## 2023-01-05 ASSESSMENT — PATIENT HEALTH QUESTIONNAIRE - PHQ9: SUM OF ALL RESPONSES TO PHQ QUESTIONS 1-9: 23

## 2023-01-05 NOTE — PROGRESS NOTES
Interventional Psychiatry Program  [address], [address]  [place] [address]  TMS Procedure Note   Nimisha Jasso MRN# [id number]  Age: 27 year old year old YOB: 1995    Pre-Procedure:  History and Physical: Reviewed in medical record  Consent Signed by: Nimisha Jasso for this course of treatment.  On: 12/21/2022    Nimisha Jasso comes into clinic today at the request of Keven Downey MD, ordering provider for TMS.    Clinical Narrative:  Patient missed last two days of treatment due to weather.  Had a difficult time tolerating treatment today; adjusted coil and turned down energy.  Patient became tearful due to issues tolerating treatment     Indications for TMS:  MDD, recurrent, severe; 4+ medication trials (from 2+ classes) ineffective; Psychotherapy ineffective     Procedure Diagnosis:  Severe episode of recurrent major depressive disorder, without psychotic features (H)  (primary encounter diagnosis)    Treatment Hx:  Treatment this series: 10  Lifetime treatment number: 10    No Known Allergies   There were no vitals taken for this visit.    Pause for the Cause  Right patient: Yes  Right procedure/correct coil:  Yes; rTMS; cpt non-billable; no provider in clinic; H1 coil.   Earplugs in place:  Yes    Procedure  Patient was seated in procedure chair. Identity and procedure was verified. Ear plugs were placed in ears and patient-specific cap was placed on head and tightened appropriately. Ruler locations were verified. Bone conducting headphones were not used.  Coil was placed at 0 - eyebrow - 15 and stimulator was set to parameters below.  Initial train was well tolerated so 55 trains were delivered.  Patient tolerated procedure well with minimal facial movement.    Motor Threshold Determination  Distance from nasion to inion: 36.5  MT 1: 0 - 6 - 13 @ 52% on 12/21/2022    Stimulation Parameters:  Standard LDLPFC  Frequency: 18  Train Duration: 2  Total pulses  delivered: 1980  Inter-train interval: 20  Tx Loc: 0 - eye - 15  Energy: 57% (110% MT)  Trains: 55  *Uses spacer*    Date MADRS QIDS PHQ-9   12/21/2022 38 22 24   12/30/2022  22 20     Plan   - cont TMS     This service provided today was under the supervising provider of the day, None; no provider avaiable, who was available if needed.     Shelly Mills RN   [facility]  [facility]ental [facility] Neuromodulation

## 2023-01-06 ENCOUNTER — OFFICE VISIT (OUTPATIENT)
Dept: PSYCHIATRY | Facility: CLINIC | Age: 28
End: 2023-01-06
Payer: COMMERCIAL

## 2023-01-06 DIAGNOSIS — F33.2 SEVERE EPISODE OF RECURRENT MAJOR DEPRESSIVE DISORDER, WITHOUT PSYCHOTIC FEATURES (H): Primary | ICD-10-CM

## 2023-01-06 ASSESSMENT — PATIENT HEALTH QUESTIONNAIRE - PHQ9: SUM OF ALL RESPONSES TO PHQ QUESTIONS 1-9: 21

## 2023-01-06 NOTE — PROGRESS NOTES
Interventional Psychiatry Program  [address] [facility]  [place] [address]  TMS Procedure Note   Nimisha Jasso MRN# [id number]  Age: 27 year old year old YOB: 1995    Pre-Procedure:  History and Physical: Reviewed in medical record  Consent Signed by: Nimisha Jasso for this course of treatment.  On: 12/21/2022    Nimisha Jasso comes into clinic today at the request of Keven Downey MD, ordering provider for TMS.    Clinical Narrative:  Patient tolerated treatment, read book. No changes reported.    Indications for TMS:  MDD, recurrent, severe; 4+ medication trials (from 2+ classes) ineffective; Psychotherapy ineffective     Procedure Diagnosis:  Severe episode of recurrent major depressive disorder, without psychotic features (H)  (primary encounter diagnosis)    Treatment Hx:  Treatment this series: 11  Lifetime treatment number: 11    No Known Allergies   There were no vitals taken for this visit.    Pause for the Cause  Right patient: Yes  Right procedure/correct coil:  Yes; rTMS; cpt 55162; H1 coil.   Earplugs in place:  Yes    Procedure  Patient was seated in procedure chair. Identity and procedure was verified. Ear plugs were placed in ears and patient-specific cap was placed on head and tightened appropriately. Ruler locations were verified. Bone conducting headphones were not used.  Coil was placed at 0 - eyebrow - 15 and stimulator was set to parameters below.  Initial train was well tolerated so 55 trains were delivered.  Patient tolerated procedure well with minimal facial movement.    Motor Threshold Determination  Distance from nasion to inion: 36.5  MT 1: 0 - 6 - 13 @ 52% on 12/21/2022    Stimulation Parameters:  Standard [facility]  Frequency: 18  Train Duration: 2  Total pulses delivered: 1980  Inter-train interval: 20  Tx Loc: 0 - eye - 15  Energy: 62% (120% MT)  Trains: 55  *Uses spacer*    Date MADRS QIDS PHQ-9   12/21/2022 38 22 24   12/30/2022  22  20   01/06/2023  19 21     Plan   - cont TMS    This service provided today was under the supervising provider of the day, Keven Downey MD, who was available if needed.     Lance Wheatley, TMS Technician  [facility]

## 2023-01-09 ENCOUNTER — OFFICE VISIT (OUTPATIENT)
Dept: PSYCHIATRY | Facility: CLINIC | Age: 28
End: 2023-01-09
Payer: COMMERCIAL

## 2023-01-09 DIAGNOSIS — F33.2 SEVERE EPISODE OF RECURRENT MAJOR DEPRESSIVE DISORDER, WITHOUT PSYCHOTIC FEATURES (H): Primary | ICD-10-CM

## 2023-01-09 ASSESSMENT — PATIENT HEALTH QUESTIONNAIRE - PHQ9: SUM OF ALL RESPONSES TO PHQ QUESTIONS 1-9: 19

## 2023-01-09 NOTE — PROGRESS NOTES
Interventional Psychiatry Program  [address], [address]  [place] [address]  TMS Procedure Note   Nimisha Jasso MRN# [id number]  Age: 27 year old year old YOB: 1995    Pre-Procedure:  History and Physical: Reviewed in medical record  Consent Signed by: Nimisha Jasso for this course of treatment. On: 12/21/2022    Nimisha Jasso comes into clinic today at the request of Keven Downey MD, ordering provider for TMS.    Clinical Narrative:  Patient tolerated treatment. No changes reported.    Indications for TMS:  MDD, recurrent, severe; 4+ medication trials (from 2+ classes) ineffective; Psychotherapy ineffective     Procedure Diagnosis:  Severe episode of recurrent major depressive disorder, without psychotic features (H)  (primary encounter diagnosis)    Treatment Hx:  Treatment this series: 12  Lifetime treatment number: 12    No Known Allergies   There were no vitals taken for this visit.    Pause for the Cause  Right patient: Yes  Right procedure/correct coil:  Yes; rTMS; cpt no bill; H1 coil.   Earplugs in place:  Yes    Procedure  Patient was seated in procedure chair. Identity and procedure was verified. Ear plugs were placed in ears and patient-specific cap was placed on head and tightened appropriately. Ruler locations were verified. Bone conducting headphones were not used.  Coil was placed at 0 - eyebrow - 15 and stimulator was set to parameters below.  Initial train was well tolerated so 55 trains were delivered.  Patient tolerated procedure well with minimal facial movement.    Motor Threshold Determination  Distance from nasion to inion: 36.5  MT 1: 0 - 6 - 13 @ 52% on 12/21/2022    Stimulation Parameters:  Standard [facility]  Frequency: 18  Train Duration: 2  Total pulses delivered: 1980  Inter-train interval: 20  Tx Loc: 0 - eye - 15  Energy: 62% (120% MT)  Trains: 55  *Uses spacer*    Date MADRS QIDS PHQ-9   12/21/2022 38 22 24   12/30/2022  22 20    01/06/2023  19 21     Plan   - cont TMS    This service provided today was under the supervising provider of the day, NONE, who was available if needed.     Lance Wheatley, TMS Technician  [facility]

## 2023-01-10 ENCOUNTER — OFFICE VISIT (OUTPATIENT)
Dept: PSYCHIATRY | Facility: CLINIC | Age: 28
End: 2023-01-10
Payer: COMMERCIAL

## 2023-01-10 DIAGNOSIS — F33.2 SEVERE EPISODE OF RECURRENT MAJOR DEPRESSIVE DISORDER, WITHOUT PSYCHOTIC FEATURES (H): Primary | ICD-10-CM

## 2023-01-10 ASSESSMENT — PATIENT HEALTH QUESTIONNAIRE - PHQ9: SUM OF ALL RESPONSES TO PHQ QUESTIONS 1-9: 21

## 2023-01-10 NOTE — PROGRESS NOTES
Interventional Psychiatry Program  [address] [facility], [address]  [place] [address]  TMS Procedure Note   Nimisha Jasso MRN# [id number]  Age: 27 year old year old YOB: 1995    Pre-Procedure:  History and Physical: Reviewed in medical record  Consent Signed by: Nimisha Jasso for this course of treatment. On: 12/21/2022    Nimisha Jasso comes into clinic today at the request of Keven Downey MD, ordering provider for TMS.    Clinical Narrative:  Patient tolerated treatment. Patient has been having extra difficulties with sleep lately, namely falling asleep, though still endorses 8-10 hours.    Indications for TMS:  MDD, recurrent, severe; 4+ medication trials (from 2+ classes) ineffective; Psychotherapy ineffective     Procedure Diagnosis:  Severe episode of recurrent major depressive disorder, without psychotic features (H)  (primary encounter diagnosis)    Treatment Hx:  Treatment this series: 13  Lifetime treatment number: 13    No Known Allergies   There were no vitals taken for this visit.    Pause for the Cause  Right patient: Yes  Right procedure/correct coil:  Yes; rTMS; cpt no bill; H1 coil.   Earplugs in place:  Yes    Procedure  Patient was seated in procedure chair. Identity and procedure was verified. Ear plugs were placed in ears and patient-specific cap was placed on head and tightened appropriately. Ruler locations were verified. Bone conducting headphones were not used.  Coil was placed at 0 - eyebrow - 15 and stimulator was set to parameters below.  Initial train was well tolerated so 55 trains were delivered.  Patient tolerated procedure well with minimal facial movement.    Motor Threshold Determination  Distance from nasion to inion: 36.5  MT 1: 0 - 6 - 13 @ 52% on 12/21/2022    Stimulation Parameters:  Standard [facility]  Frequency: 18  Train Duration: 2  Total pulses delivered: 1980  Inter-train interval: 20  Tx Loc: 0 - eye - 15  Energy: 62% (120%  MT)  Trains: 55  *Uses spacer*    Date MADRS QIDS PHQ-9   12/21/2022 38 22 24   12/30/2022  22 20   01/06/2023  19 21     Plan   - cont TMS    This service provided today was under the supervising provider of the day, BETH Flores MD, who was available if needed.     Lance Wheatley, TMS Technician  [facility]

## 2023-01-12 ENCOUNTER — OFFICE VISIT (OUTPATIENT)
Dept: PSYCHIATRY | Facility: CLINIC | Age: 28
End: 2023-01-12
Payer: COMMERCIAL

## 2023-01-12 DIAGNOSIS — F33.2 SEVERE EPISODE OF RECURRENT MAJOR DEPRESSIVE DISORDER, WITHOUT PSYCHOTIC FEATURES (H): Primary | ICD-10-CM

## 2023-01-12 ASSESSMENT — PATIENT HEALTH QUESTIONNAIRE - PHQ9: SUM OF ALL RESPONSES TO PHQ QUESTIONS 1-9: 20

## 2023-01-12 NOTE — PROGRESS NOTES
Interventional Psychiatry Program  [address] [facility], [address]  [place] [address]  TMS Procedure Note   Nimisha Jasso MRN# [id number]  Age: 27 year old year old YOB: 1995    Pre-Procedure:  History and Physical: Reviewed in medical record  Consent Signed by: Nimisha Jasso for this course of treatment. On: 12/21/2022    Nimisha Jasso comes into clinic today at the request of Keven Downey MD, ordering provider for TMS.    Clinical Narrative:  Patient tolerated treatment. Patient reports no changes.    Indications for TMS:  MDD, recurrent, severe; 4+ medication trials (from 2+ classes) ineffective; Psychotherapy ineffective     Procedure Diagnosis:  Severe episode of recurrent major depressive disorder, without psychotic features (H)  (primary encounter diagnosis)    Treatment Hx:  Treatment this series: 14  Lifetime treatment number: 14    No Known Allergies   There were no vitals taken for this visit.    Pause for the Cause  Right patient: Yes  Right procedure/correct coil:  Yes; rTMS; cpt no bill; H1 coil.   Earplugs in place:  Yes    Procedure  Patient was seated in procedure chair. Identity and procedure was verified. Ear plugs were placed in ears and patient-specific cap was placed on head and tightened appropriately. Ruler locations were verified. Bone conducting headphones were not used.  Coil was placed at 0 - eyebrow - 15 and stimulator was set to parameters below.  Initial train was well tolerated so 55 trains were delivered.  Patient tolerated procedure well with minimal facial movement.    Motor Threshold Determination  Distance from nasion to inion: 36.5  MT 1: 0 - 6 - 13 @ 52% on 12/21/2022    [facility]  [facility]  Frequency: 18  Train Duration: 2  Total pulses delivered: 1980  Inter-train interval: 20  Tx Loc: 0 - eye - 15  Energy: 62% (120% MT)  Trains: 55  *Uses spacer*    Date MADRS QIDS PHQ-9   12/21/2022 38 22 24   12/30/2022  22 20    01/06/2023  19 21     Plan   - cont TMS    This service provided today was under the supervising provider of the day, BETH Flores MD, who was available if needed.     Lance Wheatley, TMS Technician  [facility] Neu[facility]

## 2023-01-13 ENCOUNTER — OFFICE VISIT (OUTPATIENT)
Dept: PSYCHIATRY | Facility: CLINIC | Age: 28
End: 2023-01-13
Payer: COMMERCIAL

## 2023-01-13 ENCOUNTER — TELEPHONE (OUTPATIENT)
Dept: PSYCHIATRY | Facility: CLINIC | Age: 28
End: 2023-01-13

## 2023-01-13 DIAGNOSIS — F33.2 SEVERE EPISODE OF RECURRENT MAJOR DEPRESSIVE DISORDER, WITHOUT PSYCHOTIC FEATURES (H): Primary | ICD-10-CM

## 2023-01-13 ASSESSMENT — PATIENT HEALTH QUESTIONNAIRE - PHQ9: SUM OF ALL RESPONSES TO PHQ QUESTIONS 1-9: 24

## 2023-01-13 NOTE — TELEPHONE ENCOUNTER
Writer called patient to let her know the brainsway machine broke and we will have to cancel Monday's treatment and then follow up with patient on Monday to discuss Tuesday's treatment.

## 2023-01-13 NOTE — PROGRESS NOTES
Interventional Psychiatry Program  [address] [facility], [address]  [place] [address]  TMS Procedure Note   Nimisha Jasso MRN# [id number]  Age: 27 year old year old YOB: 1995    Pre-Procedure:  History and Physical: Reviewed in medical record  Consent Signed by: Nimisha Jasso for this course of treatment. On: 12/21/2022    Nimisha Jasso comes into clinic today at the request of Keven Downey MD, ordering provider for TMS.    Clinical Narrative:  Patient tolerated treatment. Patient having a rough week, a lot going on.    Indications for TMS:  MDD, recurrent, severe; 4+ medication trials (from 2+ classes) ineffective; Psychotherapy ineffective     Procedure Diagnosis:  Severe episode of recurrent major depressive disorder, without psychotic features (H)  (primary encounter diagnosis)    Treatment Hx:  Treatment this series: 15  Lifetime treatment number: 15    No Known Allergies   There were no vitals taken for this visit.    Pause for the Cause  Right patient: Yes  Right procedure/correct coil:  Yes; rTMS; cpt no bill; H1 coil.   Earplugs in place:  Yes    Procedure  Patient was seated in procedure chair. Identity and procedure was verified. Ear plugs were placed in ears and patient-specific cap was placed on head and tightened appropriately. Ruler locations were verified. Bone conducting headphones were not used.  Coil was placed at 0 - eyebrow - 15 and stimulator was set to parameters below.  Initial train was well tolerated so 55 trains were delivered.  Patient tolerated procedure well with minimal facial movement.    Motor Threshold Determination  Distance from nasion to inion: 36.5  MT 1: 0 - 6 - 13 @ 52% on 12/21/2022    Stimulation Parameters:  Standard [facility]  Frequency: 18  Train Duration: 2  Total pulses delivered: 1980  Inter-train interval: 20  Tx Loc: 0 - eye - 15  Energy: 62% (120% MT)  Trains: 55  *Uses spacer*    Date MADRS QIDS PHQ-9   12/21/22 38 22 24    12/30/22  22 20   01/06/23  19 21   01/13/23  24 24     Plan   - cont TMS    This service provided today was under the supervising provider of the day, Darcy Bains MD, who was available if needed.     Lance Wheatley, TMS Technician  [facility] Neu[facility]odulation

## 2023-01-20 ENCOUNTER — OFFICE VISIT (OUTPATIENT)
Dept: PSYCHIATRY | Facility: CLINIC | Age: 28
End: 2023-01-20
Payer: COMMERCIAL

## 2023-01-20 DIAGNOSIS — F33.2 SEVERE EPISODE OF RECURRENT MAJOR DEPRESSIVE DISORDER, WITHOUT PSYCHOTIC FEATURES (H): Primary | ICD-10-CM

## 2023-01-20 ASSESSMENT — PATIENT HEALTH QUESTIONNAIRE - PHQ9: SUM OF ALL RESPONSES TO PHQ QUESTIONS 1-9: 18

## 2023-01-20 NOTE — PROGRESS NOTES
Interventional Psychiatry Program  [address] [facility], [address]  [place] [address]  TMS Procedure Note   Nimisha Jasso MRN# [id number]  Age: 27 year old year old YOB: 1995    Pre-Procedure:  History and Physical: Reviewed in medical record  Consent Signed by: Nimisha Jasso for this course of treatment. On: 12/21/2022    Nimisha Jasso comes into clinic today at the request of Keven Downey MD, ordering provider for TMS.    Clinical Narrative:  Patient tolerated treatment.     Indications for TMS:  MDD, recurrent, severe; 4+ medication trials (from 2+ classes) ineffective; Psychotherapy ineffective     Procedure Diagnosis:  Severe episode of recurrent major depressive disorder, without psychotic features (H)  (primary encounter diagnosis)    Treatment Hx:  Treatment this series: 16  Lifetime treatment number: 16    No Known Allergies   There were no vitals taken for this visit.    Pause for the Cause  Right patient:  Yes  Right procedure/correct coil:  Yes; rTMS; cpt 35532; H1 coil.   Earplugs in place:  Yes    Procedure  Patient was seated in procedure chair. Identity and procedure was verified. Ear plugs were placed in ears and patient-specific cap was placed on head and tightened appropriately. Ruler locations were verified. Coil was placed at initial MT location and stimulator was set to initial MT. MT was retested and found as described below. Coil was placed at treatment location and stimulator was set to stimulation parameters detailed below. A test train was delivered and pt tolerated train fine. Given pt tolerance, 55 trains were delivered. Pt tolerated procedure with minimal facial and right hand movement.    Motor Threshold Determination  Distance from nasion to inion: 36.5  MT 1: 0 - 6 - 13 @ 52% on 12/21/2022  MT 2:  0 - 6 - 13 @ 53% on 1/20/2023    Stimulation Parameters:  Standard [facility]  Frequency: 18  Train Duration: 2  Total pulses delivered:  Pt belongings collected by security in bag # [id number]. Important phone numbers written down at bedside.        1980  Inter-train interval: 20  Tx Loc: 0 - eye - 15  Energy: 64% (120% MT)  Trains: 55  *Uses spacer*    Date MADRS QIDS PHQ-9   12/21/22 38 22 24   12/30/22  22 20   01/06/23  19 21   01/13/23  24 24   01/20/23  23 18     Plan   - cont TMS    Motor threshold redetermination was performed by Dr. Keven Downey, who remained available  if needed.     Romina Reid, Psychometrist  [facility]od[facility]

## 2023-01-23 ENCOUNTER — OFFICE VISIT (OUTPATIENT)
Dept: PSYCHIATRY | Facility: CLINIC | Age: 28
End: 2023-01-23
Payer: COMMERCIAL

## 2023-01-23 DIAGNOSIS — F33.2 SEVERE EPISODE OF RECURRENT MAJOR DEPRESSIVE DISORDER, WITHOUT PSYCHOTIC FEATURES (H): Primary | ICD-10-CM

## 2023-01-23 ASSESSMENT — PATIENT HEALTH QUESTIONNAIRE - PHQ9: SUM OF ALL RESPONSES TO PHQ QUESTIONS 1-9: 20

## 2023-01-23 NOTE — PROGRESS NOTES
Interventional Psychiatry Program  [address] [facility], [address]  [place] [address]  TMS Procedure Note   Nimisha Jasso MRN# [id number]  Age: 27 year old year old YOB: 1995    Pre-Procedure:  History and Physical: Reviewed in medical record  Consent Signed by: Nimisha Jasso for this course of treatment. On: 12/21/2022    Nimisha Jasso comes into clinic today at the request of Keven Downey MD, ordering provider for TMS.    Clinical Narrative:  Patient tolerated treatment. Patient played D&D over the weekend.    Indications for TMS:  MDD, recurrent, severe; 4+ medication trials (from 2+ classes) ineffective; Psychotherapy ineffective     Procedure Diagnosis:  Severe episode of recurrent major depressive disorder, without psychotic features (H)  (primary encounter diagnosis)    Treatment Hx:  Treatment this series: 17  Lifetime treatment number: 17    No Known Allergies   There were no vitals taken for this visit.    Pause for the Cause  Right patient:  Yes  Right procedure/correct coil:  Yes; rTMS; cpt 39758; H1 coil.   Earplugs in place:  Yes    Procedure  Patient was seated in procedure chair. Identity and procedure was verified. Ear plugs were placed in ears and patient-specific cap was placed on head and tightened appropriately. Ruler locations were verified. Coil was placed at initial MT location and stimulator was set to initial MT. MT was retested and found as described below. Coil was placed at treatment location and stimulator was set to stimulation parameters detailed below. A test train was delivered and pt tolerated train fine. Given pt tolerance, 55 trains were delivered. Pt tolerated procedure with minimal facial and right hand movement.    Motor Threshold Determination  Distance from nasion to inion: 36.5  MT 1: 0 - 6 - 13 @ 52% on 12/21/2022  MT 2:  0 - 6 - 13 @ 53% on 1/20/2023    [facility]  [facility]  Frequency: 18  Train Duration:  2  Total pulses delivered: 1980  Inter-train interval: 20  Tx Loc: 0 - eye - 15  Energy: 64% (120% MT)  Trains: 55  *Uses spacer*    Date MADRS QIDS PHQ-9   12/21/22 38 22 24   12/30/22  22 20   01/06/23  19 21   01/13/23  24 24   01/20/23  23 18     Plan   - cont TMS    This service provided today was under the supervising provider of the day, Darcy Bains MD, who was available if needed.     [facility]  [facility] Neu[facility]

## 2023-01-24 ENCOUNTER — OFFICE VISIT (OUTPATIENT)
Dept: PSYCHIATRY | Facility: CLINIC | Age: 28
End: 2023-01-24
Payer: COMMERCIAL

## 2023-01-24 ENCOUNTER — ALLIED HEALTH/NURSE VISIT (OUTPATIENT)
Dept: PSYCHIATRY | Facility: CLINIC | Age: 28
End: 2023-01-24
Payer: COMMERCIAL

## 2023-01-24 DIAGNOSIS — F33.2 SEVERE EPISODE OF RECURRENT MAJOR DEPRESSIVE DISORDER, WITHOUT PSYCHOTIC FEATURES (H): Primary | ICD-10-CM

## 2023-01-24 ASSESSMENT — PATIENT HEALTH QUESTIONNAIRE - PHQ9: SUM OF ALL RESPONSES TO PHQ QUESTIONS 1-9: 17

## 2023-01-24 NOTE — PROGRESS NOTES
Physicians:  Care Coordination Note     SITUATION   Nimisha Jasso is a 27 year old person who has been receiving Transcranial Magnetic Stimulation (TMS) at the request of Keven Downey MD.    BACKGROUND     During course of TMS    ASSESSMENT        Met with patient today to check on Keith during TMS course.    During today's discussion writer and patient discussed:    Keith reports that they have noticed a slight improvement in mood, but unsure if this is related to TMS or other life circumstances.  Keith continues to be very worried about the missed week due to the machine breaking down and the missed appointments due to weather.  They don't want to have sat in TMS for many weeks only to not see any benefit.      We discussed two different options to help boost the maybe slight benefit we are seeing.  Once would be an extension-but this is something that we can look at later in course.  Another option would be to increase trains from 55 to 83 to see if extra stimulation would help boost the benefit.  Discussed that writer would have to discuss these options with Dr. Downey.  Keith verbalized understanding and would prefer to increase number of trains to 83.        PHQ 9: 17    # of completed TMS Sessions: 18  Toleration of TMS: tolerating well          PLAN       Nursing Interventions: Education on TMS     Follow-up plan:  Cont TMS       Shelly Mills RN

## 2023-01-24 NOTE — PROGRESS NOTES
" Interventional Psychiatry Program  [address], [address]  [place] [address]  TMS Procedure Note   Nimisha Jasso MRN# [id number]  Age: 27 year old year old YOB: 1995    Pre-Procedure:  History and Physical: Reviewed in medical record  Consent Signed by: Nimisha Jasso for this course of treatment. On: 12/21/2022    Nimisha Jasso comes into clinic today at the request of Keven Downey MD, ordering provider for TMS.    Clinical Narrative:  Patient tolerated treatment. Patient reports no changes; sleep has been \"off and on\" but last night was a good night.    Indications for TMS:  MDD, recurrent, severe; 4+ medication trials (from 2+ classes) ineffective; Psychotherapy ineffective     Procedure Diagnosis:  Severe episode of recurrent major depressive disorder, without psychotic features (H)  (primary encounter diagnosis)    Treatment Hx:  Treatment this series: 18  Lifetime treatment number: 18    No Known Allergies   There were no vitals taken for this visit.    Pause for the Cause  Right patient:  Yes  Right procedure/correct coil:  Yes; rTMS; cpt 14415; H1 coil.   Earplugs in place:  Yes    Procedure  Patient was seated in procedure chair. Identity and procedure was verified. Ear plugs were placed in ears and patient-specific cap was placed on head and tightened appropriately. Ruler locations were verified. Coil was placed at initial MT location and stimulator was set to initial MT. MT was retested and found as described below. Coil was placed at treatment location and stimulator was set to stimulation parameters detailed below. A test train was delivered and pt tolerated train fine. Given pt tolerance, 55 trains were delivered. Pt tolerated procedure with minimal facial and right hand movement.    Motor Threshold Determination  Distance from nasion to inion: 36.5  MT 1: 0 - 6 - 13 @ 52% on 12/21/2022  MT 2:  0 - 6 - 13 @ 53% on 1/20/2023    Stimulation " Parameters:  Standard [facility]  Frequency: 18  Train Duration: 2  Total pulses delivered: 1980  Inter-train interval: 20  Tx Loc: 0 - eye - 15  Energy: 64% (120% MT)  Trains: 55  *Uses spacer*    Date MADRS QIDS PHQ-9   12/21/22 38 22 24   12/30/22  22 20   01/06/23  19 21   01/13/23  24 24   01/20/23  23 18     Plan   - cont TMS    This service provided today was under the supervising provider of the day, BETH Flores MD, who was available if needed.     Lance Whealtey, TMS Technician  [facility] Neuromodulation

## 2023-01-24 NOTE — Clinical Note
We are seeing some slight benefit-wondering if we can increase number of trains to 83 to give Keith some more stimulation?   Mary Jane

## 2023-01-25 ENCOUNTER — OFFICE VISIT (OUTPATIENT)
Dept: PSYCHIATRY | Facility: CLINIC | Age: 28
End: 2023-01-25
Payer: COMMERCIAL

## 2023-01-25 DIAGNOSIS — F33.2 SEVERE EPISODE OF RECURRENT MAJOR DEPRESSIVE DISORDER, WITHOUT PSYCHOTIC FEATURES (H): Primary | ICD-10-CM

## 2023-01-25 ASSESSMENT — PATIENT HEALTH QUESTIONNAIRE - PHQ9: SUM OF ALL RESPONSES TO PHQ QUESTIONS 1-9: 20

## 2023-01-25 NOTE — PROGRESS NOTES
Interventional Psychiatry Program  [address] [facility], [address]  [place] [address]  TMS Procedure Note   Nimisha Jasso MRN# [id number]  Age: 27 year old year old YOB: 1995    Pre-Procedure:  History and Physical: Reviewed in medical record  Consent Signed by: Nimisha Jasso for this course of treatment. On: 12/21/2022    Nimisha Jasso comes into clinic today at the request of Keven Downey MD, ordering provider for TMS.    Clinical Narrative:  Patient tolerated treatment. Patient reports no changes.    Indications for TMS:  MDD, recurrent, severe; 4+ medication trials (from 2+ classes) ineffective; Psychotherapy ineffective     Procedure Diagnosis:  Severe episode of recurrent major depressive disorder, without psychotic features (H)  (primary encounter diagnosis)    Treatment Hx:  Treatment this series: 19  Lifetime treatment number: 19    No Known Allergies   There were no vitals taken for this visit.    Pause for the Cause  Right patient:  Yes  Right procedure/correct coil:  Yes; rTMS; cpt 73134; H1 coil.   Earplugs in place:  Yes    Procedure  Patient was seated in procedure chair. Identity and procedure was verified. Ear plugs were placed in ears and patient-specific cap was placed on head and tightened appropriately. Ruler locations were verified. Coil was placed at initial MT location and stimulator was set to initial MT. MT was retested and found as described below. Coil was placed at treatment location and stimulator was set to stimulation parameters detailed below. A test train was delivered and pt tolerated train fine. Given pt tolerance, 83 trains were delivered. Pt tolerated procedure with minimal facial and right hand movement.    Motor Threshold Determination  Distance from nasion to inion: 36.5  MT 1: 0 - 6 - 13 @ 52% on 12/21/2022  MT 2:  0 - 6 - 13 @ 53% on 1/20/2023    [facility]  [facility]  Frequency: 18  Train Duration: 2  Total pulses  delivered: 1980  Inter-train interval: 20  Tx Loc: 0 - eye - 15  Energy: 64% (120% MT)  Trains: 83  *Uses spacer*    Date MADRS QIDS PHQ-9   12/21/22 38 22 24   12/30/22  22 20   01/06/23  19 21   01/13/23  24 24   01/20/23  23 18     Plan   - cont TMS    This service provided today was under the supervising provider of the day, Fabian Robison MD, who was available if needed.     Lance Wheatley, TMS Technician  [facility] Neuromodulation

## 2023-01-26 ENCOUNTER — OFFICE VISIT (OUTPATIENT)
Dept: PSYCHIATRY | Facility: CLINIC | Age: 28
End: 2023-01-26
Payer: COMMERCIAL

## 2023-01-26 DIAGNOSIS — F33.2 SEVERE EPISODE OF RECURRENT MAJOR DEPRESSIVE DISORDER, WITHOUT PSYCHOTIC FEATURES (H): Primary | ICD-10-CM

## 2023-01-26 ASSESSMENT — PATIENT HEALTH QUESTIONNAIRE - PHQ9: SUM OF ALL RESPONSES TO PHQ QUESTIONS 1-9: 17

## 2023-01-26 NOTE — PROGRESS NOTES
Interventional Psychiatry Program  [address] [facility] [facility], [address]  [place] [address]  TMS Procedure Note   Nimisha Jasso MRN# [id number]  Age: 27 year old year old YOB: 1995    Pre-Procedure:  History and Physical: Reviewed in medical record  Consent Signed by: Nimisha Jasso for this course of treatment. On: 12/21/2022    Nimisha Jasso comes into clinic today at the request of Keven Downey MD, ordering provider for TMS.    Clinical Narrative:  Patient tolerated treatment. Patient reports no changes.    Indications for TMS:  MDD, recurrent, severe; 4+ medication trials (from 2+ classes) ineffective; Psychotherapy ineffective     Procedure Diagnosis:  Severe episode of recurrent major depressive disorder, without psychotic features (H)  (primary encounter diagnosis)    Treatment Hx:  Treatment this series: 20  Lifetime treatment number: 20    No Known Allergies   There were no vitals taken for this visit.    Pause for the Cause  Right patient:  Yes  Right procedure/correct coil:  Yes; rTMS; cpt 12228; H1 coil.   Earplugs in place:  Yes    Procedure  Patient was seated in procedure chair. Identity and procedure was verified. Ear plugs were placed in ears and patient-specific cap was placed on head and tightened appropriately. Ruler locations were verified. Coil was placed at treatment location and stimulator was set to parameters described below. A test train was delivered and pt tolerated train. Given pt tolerance, 83 treatment trains were delivered. Pt tolerated procedure with minimal facial and hand movement.    Motor Threshold Determination  Distance from nasion to inion: 36.5  MT 1: 0 - 6 - 13 @ 52% on 12/21/2022  MT 2:  0 - 6 - 13 @ 53% on 1/20/2023    Stimulation Parameters:  Standard [facility]  Frequency: 18  Train Duration: 2  Total pulses delivered: 1980  Inter-train interval: 20  Tx Loc: 0 - eye - 15  Energy: 64% (120% MT)  Trains: 83  *Uses spacer*    Date MADRS QIDS  PHQ-9   12/21/22 38 22 24   12/30/22  22 20   01/06/23  19 21   01/13/23  24 24   01/20/23  23 18     Plan   - cont TMS    This service provided today was under the supervising provider of the day, ELGIN Flores MD, who was available if needed.     Romina Reid, Psychometrist  [facility]  [facility]ental [facility] Neuromodulation

## 2023-01-27 ENCOUNTER — OFFICE VISIT (OUTPATIENT)
Dept: PSYCHIATRY | Facility: CLINIC | Age: 28
End: 2023-01-27
Payer: COMMERCIAL

## 2023-01-27 DIAGNOSIS — F33.2 SEVERE EPISODE OF RECURRENT MAJOR DEPRESSIVE DISORDER, WITHOUT PSYCHOTIC FEATURES (H): Primary | ICD-10-CM

## 2023-01-27 ASSESSMENT — PATIENT HEALTH QUESTIONNAIRE - PHQ9: SUM OF ALL RESPONSES TO PHQ QUESTIONS 1-9: 17

## 2023-01-27 NOTE — PROGRESS NOTES
Interventional Psychiatry Program  [address] [facility], [address]  [place] [address]  TMS Procedure Note   Nimisha Jasso MRN# [id number]  Age: 27 year old year old YOB: 1995    Pre-Procedure:  History and Physical: Reviewed in medical record  Consent Signed by: Nimisha Jasso for this course of treatment. On: 12/21/2022    Nimisha Jasso comes into clinic today at the request of Keven Downey MD, ordering provider for TMS.    Clinical Narrative:  Patient tolerated treatment. Patient reports no changes.    Indications for TMS:  MDD, recurrent, severe; 4+ medication trials (from 2+ classes) ineffective; Psychotherapy ineffective     Procedure Diagnosis:  Severe episode of recurrent major depressive disorder, without psychotic features (H)  (primary encounter diagnosis)    Treatment Hx:  Treatment this series: 21  Lifetime treatment number: 21    No Known Allergies   There were no vitals taken for this visit.    Pause for the Cause  Right patient:  Yes  Right procedure/correct coil:  Yes; rTMS; cpt 53444; H1 coil.   Earplugs in place:  Yes    Procedure  Patient was seated in procedure chair. Identity and procedure was verified. Ear plugs were placed in ears and patient-specific cap was placed on head and tightened appropriately. Ruler locations were verified. Coil was placed at treatment location and stimulator was set to parameters described below. A test train was delivered and pt tolerated train. Given pt tolerance, 83 treatment trains were delivered. Pt tolerated procedure with minimal facial and hand movement.    Motor Threshold Determination  Distance from nasion to inion: 36.5  MT 1: 0 - 6 - 13 @ 52% on 12/21/2022  MT 2:  0 - 6 - 13 @ 53% on 1/20/2023    Stimulation Parameters:  Standard [facility]  Frequency: 18  Train Duration: 2  Total pulses delivered: 1980  Inter-train interval: 20  Tx Loc: 0 - eye - 15  Energy: 64% (120% MT)  Trains: 83  *Uses spacer*    Date MADRS QIDS  PHQ-9   12/21/22 38 22 24   12/30/22  22 20   01/06/23  19 21   01/13/23  24 24   01/20/23  23 18   1/27/23  20 17     Plan   - cont TMS    This service provided today was under the supervising provider of the day, Darcy Bains MD, who was available if needed.     Romina Reid, Psychometrist  [facility]  Mental [facility] Neuromodulation

## 2023-01-30 ENCOUNTER — OFFICE VISIT (OUTPATIENT)
Dept: PSYCHIATRY | Facility: CLINIC | Age: 28
End: 2023-01-30
Payer: COMMERCIAL

## 2023-01-30 DIAGNOSIS — F33.2 SEVERE EPISODE OF RECURRENT MAJOR DEPRESSIVE DISORDER, WITHOUT PSYCHOTIC FEATURES (H): Primary | ICD-10-CM

## 2023-01-30 ASSESSMENT — PATIENT HEALTH QUESTIONNAIRE - PHQ9: SUM OF ALL RESPONSES TO PHQ QUESTIONS 1-9: 17

## 2023-01-30 NOTE — PROGRESS NOTES
Interventional Psychiatry Program  [address] [facility], [address]  [place] [address]  TMS Procedure Note   Nimisha Jasso MRN# [id number]  Age: 27 year old year old YOB: 1995    Pre-Procedure:  History and Physical: Reviewed in medical record  Consent Signed by: Nimisha Jasso for this course of treatment. On: 12/21/2022    Nimisha Jasso comes into clinic today at the request of Keven Downey MD, ordering provider for TMS.    Clinical Narrative:  Patient tolerated treatment. Patient reports no changes.    Indications for TMS:  MDD, recurrent, severe; 4+ medication trials (from 2+ classes) ineffective; Psychotherapy ineffective     Procedure Diagnosis:  Severe episode of recurrent major depressive disorder, without psychotic features (H)  (primary encounter diagnosis)    Treatment Hx:  Treatment this series: 22  Lifetime treatment number: 22    No Known Allergies   There were no vitals taken for this visit.    Pause for the Cause  Right patient:  Yes  Right procedure/correct coil:  Yes; rTMS; cpt 67612; H1 coil.   Earplugs in place:  Yes    Procedure  Patient was seated in procedure chair. Identity and procedure was verified. Ear plugs were placed in ears and patient-specific cap was placed on head and tightened appropriately. Ruler locations were verified. Coil was placed at treatment location and stimulator was set to parameters described below. A test train was delivered and pt tolerated train. Given pt tolerance, 83 treatment trains were delivered. Pt tolerated procedure with minimal facial and hand movement.    Motor Threshold Determination  Distance from nasion to inion: 36.5  MT 1: 0 - 6 - 13 @ 52% on 12/21/2022  MT 2:  0 - 6 - 13 @ 53% on 1/20/2023    Stimulation Parameters:  Standard [facility]  Frequency: 18  Train Duration: 2  Total pulses delivered: 1980  Inter-train interval: 20  Tx Loc: 0 - eye - 15  Energy: 64% (120% MT)  Trains: 83  *Uses spacer*    Date MADRS QIDS  PHQ-9   12/21/22 38 22 24   12/30/22  22 20   01/06/23  19 21   01/13/23  24 24   01/20/23  23 18   1/27/23  20 17     Plan   - cont TMS    This service provided today was under the supervising provider of the day, Darcy Bains MD, who was available if needed.     Lance Wheatley, TMS Technician  [facility] Neuromodulation

## 2023-01-31 ENCOUNTER — OFFICE VISIT (OUTPATIENT)
Dept: PSYCHIATRY | Facility: CLINIC | Age: 28
End: 2023-01-31
Payer: COMMERCIAL

## 2023-01-31 DIAGNOSIS — F33.2 SEVERE EPISODE OF RECURRENT MAJOR DEPRESSIVE DISORDER, WITHOUT PSYCHOTIC FEATURES (H): Primary | ICD-10-CM

## 2023-01-31 ASSESSMENT — PATIENT HEALTH QUESTIONNAIRE - PHQ9: SUM OF ALL RESPONSES TO PHQ QUESTIONS 1-9: 20

## 2023-01-31 NOTE — PROGRESS NOTES
Interventional Psychiatry Program  [address] [facility], [address]  [place] [address]  TMS Procedure Note   Nimisha Jasso MRN# [id number]  Age: 27 year old year old YOB: 1995    Pre-Procedure:  History and Physical: Reviewed in medical record  Consent Signed by: Nimisha Jasso for this course of treatment. On: 12/21/2022    Nimisha Jasso comes into clinic today at the request of Keven Downey MD, ordering provider for TMS.    Clinical Narrative:  Patient tolerated treatment. Patient reports no changes.    Indications for TMS:  MDD, recurrent, severe; 4+ medication trials (from 2+ classes) ineffective; Psychotherapy ineffective     Procedure Diagnosis:  Severe episode of recurrent major depressive disorder, without psychotic features (H)  (primary encounter diagnosis)    Treatment Hx:  Treatment this series: 23  Lifetime treatment number: 23    No Known Allergies   There were no vitals taken for this visit.    Pause for the Cause  Right patient:  Yes  Right procedure/correct coil:  Yes; rTMS; cpt 75628; H1 coil.   Earplugs in place:  Yes    Procedure  Patient was seated in procedure chair. Identity and procedure was verified. Ear plugs were placed in ears and patient-specific cap was placed on head and tightened appropriately. Ruler locations were verified. Coil was placed at treatment location and stimulator was set to parameters described below. A test train was delivered and pt tolerated train. Given pt tolerance, 83 treatment trains were delivered. Pt tolerated procedure with minimal facial and hand movement.    Motor Threshold Determination  Distance from nasion to inion: 36.5  MT 1: 0 - 6 - 13 @ 52% on 12/21/2022  MT 2:  0 - 6 - 13 @ 53% on 1/20/2023    Stimulation Parameters:  Standard [facility]  Frequency: 18  Train Duration: 2  Total pulses delivered: 1980  Inter-train interval: 20  Tx Loc: 0 - eye - 15  Energy: 64% (120% MT)  Trains: 83  *Uses spacer*    Date MADRS QIDS  PHQ-9   12/21/22 38 22 24   12/30/22  22 20   01/06/23  19 21   01/13/23  24 24   01/20/23  23 18   1/27/23  20 17     Plan   - cont TMS    This service provided today was under the supervising provider of the day, BETH Flores MD, who was available if needed.     Lance Wheatley, TMS Technician  [facility] Neuromodulation

## 2023-02-01 ENCOUNTER — OFFICE VISIT (OUTPATIENT)
Dept: PSYCHIATRY | Facility: CLINIC | Age: 28
End: 2023-02-01
Payer: COMMERCIAL

## 2023-02-01 DIAGNOSIS — F33.2 SEVERE EPISODE OF RECURRENT MAJOR DEPRESSIVE DISORDER, WITHOUT PSYCHOTIC FEATURES (H): Primary | ICD-10-CM

## 2023-02-01 ASSESSMENT — PATIENT HEALTH QUESTIONNAIRE - PHQ9: SUM OF ALL RESPONSES TO PHQ QUESTIONS 1-9: 18

## 2023-02-01 NOTE — PROGRESS NOTES
Interventional Psychiatry Program  [address] [facility], [address]  [place] [address]  TMS Procedure Note   Nimisha Jasso MRN# [id number]  Age: 27 year old year old YOB: 1995    Pre-Procedure:  History and Physical: Reviewed in medical record  Consent Signed by: Nimisha Jasso for this course of treatment. On: 12/21/2022    Nimisha Jasso comes into clinic today at the request of Keven Downey MD, ordering provider for TMS.    Clinical Narrative:  Patient tolerated treatment. Patient reports no changes.    Indications for TMS:  MDD, recurrent, severe; 4+ medication trials (from 2+ classes) ineffective; Psychotherapy ineffective     Procedure Diagnosis:  Severe episode of recurrent major depressive disorder, without psychotic features (H)  (primary encounter diagnosis)    Treatment Hx:  Treatment this series: 24  Lifetime treatment number: 24    No Known Allergies   There were no vitals taken for this visit.    Pause for the Cause  Right patient:  Yes  Right procedure/correct coil:  Yes; rTMS; cpt 86163; H1 coil.   Earplugs in place:  Yes    Procedure  Patient was seated in procedure chair. Identity and procedure was verified. Ear plugs were placed in ears and patient-specific cap was placed on head and tightened appropriately. Ruler locations were verified. Coil was placed at treatment location and stimulator was set to parameters described below. A test train was delivered and pt tolerated train. Given pt tolerance, 83 treatment trains were delivered. Pt tolerated procedure with minimal facial and hand movement.    Motor Threshold Determination  Distance from nasion to inion: 36.5  MT 1: 0 - 6 - 13 @ 52% on 12/21/2022  MT 2:  0 - 6 - 13 @ 53% on 1/20/2023    Stimulation Parameters:  Standard [facility]  Frequency: 18  Train Duration: 2  Total pulses delivered: 1980  Inter-train interval: 20  Tx Loc: 0 - eye - 15  Energy: 64% (120% MT)  Trains: 83  *Uses spacer*    Date MADRS QIDS  PHQ-9   12/21/22 38 22 24   12/30/22  22 20   01/06/23  19 21   01/13/23  24 24   01/20/23  23 18   1/27/23  20 17     Plan   - cont TMS    This service provided today was under the supervising provider of the day, Keven Downey MD, who was available if needed.     Lance Wheatley, TMS Technician  [facility] Neuromodulation

## 2023-02-02 ENCOUNTER — OFFICE VISIT (OUTPATIENT)
Dept: PSYCHIATRY | Facility: CLINIC | Age: 28
End: 2023-02-02
Payer: COMMERCIAL

## 2023-02-02 DIAGNOSIS — F33.2 SEVERE EPISODE OF RECURRENT MAJOR DEPRESSIVE DISORDER, WITHOUT PSYCHOTIC FEATURES (H): Primary | ICD-10-CM

## 2023-02-02 ASSESSMENT — PATIENT HEALTH QUESTIONNAIRE - PHQ9: SUM OF ALL RESPONSES TO PHQ QUESTIONS 1-9: 16

## 2023-02-02 NOTE — PROGRESS NOTES
Interventional Psychiatry Program  [address] [facility], [address]  [place] [address]  TMS Procedure Note   Nimisha Jasso MRN# [id number]  Age: 27 year old year old YOB: 1995    Pre-Procedure:  History and Physical: Reviewed in medical record  Consent Signed by: Nimisha Jasso for this course of treatment. On: 12/21/2022    Nimisha Jasso comes into clinic today at the request of Keven Downey MD, ordering provider for TMS.    Clinical Narrative:  Patient tolerated treatment. Patient reports no changes.    Indications for TMS:  MDD, recurrent, severe; 4+ medication trials (from 2+ classes) ineffective; Psychotherapy ineffective     Procedure Diagnosis:  Severe episode of recurrent major depressive disorder, without psychotic features (H)  (primary encounter diagnosis)    Treatment Hx:  Treatment this series: 25  Lifetime treatment number: 25    No Known Allergies   There were no vitals taken for this visit.    Pause for the Cause  Right patient:  Yes  Right procedure/correct coil:  Yes; rTMS; cpt 25409; H1 coil.   Earplugs in place:  Yes    Procedure  Patient was seated in procedure chair. Identity and procedure was verified. Ear plugs were placed in ears and patient-specific cap was placed on head and tightened appropriately. Ruler locations were verified. Coil was placed at treatment location and stimulator was set to parameters described below. A test train was delivered and pt tolerated train. Given pt tolerance, 83 treatment trains were delivered. Pt tolerated procedure with minimal facial and hand movement.    Motor Threshold Determination  Distance from nasion to inion: 36.5  MT 1: 0 - 6 - 13 @ 52% on 12/21/2022  MT 2:  0 - 6 - 13 @ 53% on 1/20/2023    Stimulation Parameters:  Standard [facility]  Frequency: 18  Train Duration: 2  Total pulses delivered: 1980  Inter-train interval: 20  Tx Loc: 0 - eye - 15  Energy: 64% (120% MT)  Trains: 83  *Uses spacer*    Date MADRS QIDS  PHQ-9   12/21/22 38 22 24   12/30/22  22 20   01/06/23  19 21   01/13/23  24 24   01/20/23  23 18   1/27/23  20 17     Plan   - cont TMS    This service provided today was under the supervising provider of the day, BETH Flores MD, who was available if needed.     Lance Wheatley, TMS Technician  [facility] Neuromodulation

## 2023-02-03 ENCOUNTER — OFFICE VISIT (OUTPATIENT)
Dept: PSYCHIATRY | Facility: CLINIC | Age: 28
End: 2023-02-03
Payer: COMMERCIAL

## 2023-02-03 DIAGNOSIS — F33.2 SEVERE EPISODE OF RECURRENT MAJOR DEPRESSIVE DISORDER, WITHOUT PSYCHOTIC FEATURES (H): Primary | ICD-10-CM

## 2023-02-03 ASSESSMENT — PATIENT HEALTH QUESTIONNAIRE - PHQ9: SUM OF ALL RESPONSES TO PHQ QUESTIONS 1-9: 19

## 2023-02-03 NOTE — PROGRESS NOTES
Interventional Psychiatry Program  [address] [facility], [address]  [place] [address]  TMS Procedure Note   Nimisha Jasso MRN# [id number]  Age: 27 year old year old YOB: 1995    Pre-Procedure:  History and Physical: Reviewed in medical record  Consent Signed by: Nimisha Jasso for this course of treatment. On: 12/21/2022    Nimisha Jasso comes into clinic today at the request of Keven Downey MD, ordering provider for TMS.    Clinical Narrative:  Patient tolerated treatment. Patient reports no changes.    Indications for TMS:  MDD, recurrent, severe; 4+ medication trials (from 2+ classes) ineffective; Psychotherapy ineffective     Procedure Diagnosis:  Severe episode of recurrent major depressive disorder, without psychotic features (H)  (primary encounter diagnosis)    Treatment Hx:  Treatment this series: 26  Lifetime treatment number: 26    No Known Allergies   There were no vitals taken for this visit.    Pause for the Cause  Right patient:  Yes  Right procedure/correct coil:  Yes; rTMS; cpt 52663; H1 coil.   Earplugs in place:  Yes    Procedure  Patient was seated in procedure chair. Identity and procedure was verified. Ear plugs were placed in ears and patient-specific cap was placed on head and tightened appropriately. Ruler locations were verified. Coil was placed at treatment location and stimulator was set to parameters described below. A test train was delivered and pt tolerated train. Given pt tolerance, 83 treatment trains were delivered. Pt tolerated procedure with minimal facial and hand movement.    Motor Threshold Determination  Distance from nasion to inion: 36.5  MT 1: 0 - 6 - 13 @ 52% on 12/21/2022  MT 2:  0 - 6 - 13 @ 53% on 1/20/2023    Stimulation Parameters:  Standard [facility]  Frequency: 18  Train Duration: 2  Total pulses delivered: 1980  Inter-train interval: 20  Tx Loc: 0 - eye - 15  Energy: 64% (120% MT)  Trains: 83  *Uses spacer*    Date MADRS QIDS  PHQ-9   12/21/22 38 22 24   12/30/22  22 20   01/06/23  19 21   01/13/23  24 24   01/20/23  23 18   01/27/23  20 17   02/03/23  20 19     Plan   - cont TMS    This service provided today was under the supervising provider of the day, Magnus Henriquez MD, who was available if needed.     Zoraida Swift, TMS Technician  [facility] Neuromodulation    Lance Wheatley, TMS Technician  [facility]

## 2023-02-07 ENCOUNTER — TELEPHONE (OUTPATIENT)
Dept: PSYCHIATRY | Facility: CLINIC | Age: 28
End: 2023-02-07

## 2023-02-07 NOTE — TELEPHONE ENCOUNTER
Writer called patient to review covid policy for TMS treatments.  Patient agreed to this and will come to treatment tomorrow.

## 2023-02-08 ENCOUNTER — OFFICE VISIT (OUTPATIENT)
Dept: PSYCHIATRY | Facility: CLINIC | Age: 28
End: 2023-02-08
Payer: COMMERCIAL

## 2023-02-08 DIAGNOSIS — F33.2 SEVERE EPISODE OF RECURRENT MAJOR DEPRESSIVE DISORDER, WITHOUT PSYCHOTIC FEATURES (H): Primary | ICD-10-CM

## 2023-02-08 ASSESSMENT — PATIENT HEALTH QUESTIONNAIRE - PHQ9: SUM OF ALL RESPONSES TO PHQ QUESTIONS 1-9: 19

## 2023-02-08 NOTE — PROGRESS NOTES
Interventional Psychiatry Program  [address], [address]  [place] [address]  TMS Procedure Note   Nimisha Jasso MRN# [id number]  Age: 27 year old year old YOB: 1995    Pre-Procedure:  History and Physical: Reviewed in medical record  Consent Signed by: Nimisha Jasso for this course of treatment. On: 12/21/2022    Nimisha Jasso comes into clinic today at the request of Keven Downey MD, ordering provider for TMS.    Clinical Narrative:  Patient tolerated treatment. Patient reports no changes.    Indications for TMS:  MDD, recurrent, severe; 4+ medication trials (from 2+ classes) ineffective; Psychotherapy ineffective     Procedure Diagnosis:  Severe episode of recurrent major depressive disorder, without psychotic features (H)  (primary encounter diagnosis)    Treatment Hx:  Treatment this series: 27  Lifetime treatment number: 27    No Known Allergies   There were no vitals taken for this visit.    Pause for the Cause  Right patient:  Yes  Right procedure/correct coil:  Yes; rTMS; cpt 52182; H1 coil.   Earplugs in place:  Yes    Procedure  Patient was seated in procedure chair. Identity and procedure was verified. Ear plugs were placed in ears and patient-specific cap was placed on head and tightened appropriately. Ruler locations were verified. Coil was placed at treatment location and stimulator was set to parameters described below. A test train was delivered and pt tolerated train. Given pt tolerance, 83 treatment trains were delivered. Pt tolerated procedure with minimal facial and hand movement.    Motor Threshold Determination  Distance from nasion to inion: 36.5  MT 1: 0 - 6 - 13 @ 52% on 12/21/2022  MT 2:  0 - 6 - 13 @ 53% on 1/20/2023    Stimulation Parameters:  Standard [facility]  Frequency: 18  Train Duration: 2  Total pulses delivered: 1980  Inter-train interval: 20  Tx Loc: 0 - eye - 15  Energy: 64% (120% MT)  Trains: 83  *Uses spacer*    Date MADRS QIDS  PHQ-9   12/21/22 38 22 24   12/30/22  22 20   01/06/23  19 21   01/13/23  24 24   01/20/23  23 18   01/27/23  20 17   02/03/23  20 19     Plan   - cont TMS    This service provided today was under the supervising provider of the day, Keven Downey MD, who was available if needed.    Lance Wheatley, TMS Technician  [facility] Neuromodulation

## 2023-02-09 ENCOUNTER — OFFICE VISIT (OUTPATIENT)
Dept: PSYCHIATRY | Facility: CLINIC | Age: 28
End: 2023-02-09
Payer: COMMERCIAL

## 2023-02-09 DIAGNOSIS — F33.2 SEVERE EPISODE OF RECURRENT MAJOR DEPRESSIVE DISORDER, WITHOUT PSYCHOTIC FEATURES (H): Primary | ICD-10-CM

## 2023-02-09 ASSESSMENT — PATIENT HEALTH QUESTIONNAIRE - PHQ9: SUM OF ALL RESPONSES TO PHQ QUESTIONS 1-9: 22

## 2023-02-09 NOTE — PROGRESS NOTES
Interventional Psychiatry Program  [address] [facility], [address]  [place] [address]  TMS Procedure Note   Nimisha Jasso MRN# [id number]  Age: 27 year old year old YOB: 1995    Pre-Procedure:  History and Physical: Reviewed in medical record  Consent Signed by: Nimisha Jasso for this course of treatment. On: 12/21/2022    Nimisha Jasso comes into clinic today at the request of Keven Downey MD, ordering provider for TMS.    Clinical Narrative:  Patient tolerated treatment. Patient reports no changes.    Indications for TMS:  MDD, recurrent, severe; 4+ medication trials (from 2+ classes) ineffective; Psychotherapy ineffective     Procedure Diagnosis:  Severe episode of recurrent major depressive disorder, without psychotic features (H)  (primary encounter diagnosis)    Treatment Hx:  Treatment this series: 28  Lifetime treatment number: 28    No Known Allergies   There were no vitals taken for this visit.    Pause for the Cause  Right patient:  Yes  Right procedure/correct coil:  Yes; rTMS; cpt 32292; H1 coil.   Earplugs in place:  Yes    Procedure  Patient was seated in procedure chair. Identity and procedure was verified. Ear plugs were placed in ears and patient-specific cap was placed on head and tightened appropriately. Ruler locations were verified. Coil was placed at treatment location and stimulator was set to parameters described below. A test train was delivered and pt tolerated train. Given pt tolerance, 83 treatment trains were delivered. Pt tolerated procedure with minimal facial and hand movement.    Motor Threshold Determination  Distance from nasion to inion: 36.5  MT 1: 0 - 6 - 13 @ 52% on 12/21/2022  MT 2:  0 - 6 - 13 @ 53% on 1/20/2023    Stimulation Parameters:  Standard [facility]  Frequency: 18  Train Duration: 2  Total pulses delivered: 1980  Inter-train interval: 20  Tx Loc: 0 - eye - 15  Energy: 64% (120% MT)  Trains: 83  *Uses spacer*    Date MADRS QIDS  PHQ-9   12/21/22 38 22 24   12/30/22  22 20   01/06/23  19 21   01/13/23  24 24   01/20/23  23 18   01/27/23  20 17   02/03/23  20 19     Plan   - cont TMS    This service provided today was under the supervising provider of the day, BETH Flores MD, who was available if needed.    Zoraida Swift, TMS Technician  [facility] Neuromodulation    Lance Wheatley, TMS Technician  [facility]

## 2023-02-10 ENCOUNTER — OFFICE VISIT (OUTPATIENT)
Dept: PSYCHIATRY | Facility: CLINIC | Age: 28
End: 2023-02-10
Payer: COMMERCIAL

## 2023-02-10 DIAGNOSIS — F33.2 SEVERE EPISODE OF RECURRENT MAJOR DEPRESSIVE DISORDER, WITHOUT PSYCHOTIC FEATURES (H): Primary | ICD-10-CM

## 2023-02-10 ASSESSMENT — PATIENT HEALTH QUESTIONNAIRE - PHQ9: SUM OF ALL RESPONSES TO PHQ QUESTIONS 1-9: 18

## 2023-02-10 NOTE — PROGRESS NOTES
Interventional Psychiatry Program  [address] [facility], [address]  [place] [address]  TMS Procedure Note   Nimisha Jasso MRN# [id number]  Age: 27 year old year old YOB: 1995    Pre-Procedure:  History and Physical: Reviewed in medical record  Consent Signed by: Nimisha Jasso for this course of treatment. On: 12/21/2022    Nimisha Jasso comes into clinic today at the request of Keven Downey MD, ordering provider for TMS.    Clinical Narrative:  Patient tolerated treatment. COVID; patient is feeling better these days.    Indications for TMS:  MDD, recurrent, severe; 4+ medication trials (from 2+ classes) ineffective; Psychotherapy ineffective     Procedure Diagnosis:  Severe episode of recurrent major depressive disorder, without psychotic features (H)  (primary encounter diagnosis)    Treatment Hx:  Treatment this series: 29  Lifetime treatment number: 29    No Known Allergies   There were no vitals taken for this visit.    Pause for the Cause  Right patient:  Yes  Right procedure/correct coil:  Yes; rTMS; cpt 34257; H1 coil.   Earplugs in place:  Yes    Procedure  Patient was seated in procedure chair. Identity and procedure was verified. Ear plugs were placed in ears and patient-specific cap was placed on head and tightened appropriately. Ruler locations were verified. Coil was placed at treatment location and stimulator was set to parameters described below. A test train was delivered and pt tolerated train. Given pt tolerance, 83 treatment trains were delivered. Pt tolerated procedure with minimal facial and hand movement.    Motor Threshold Determination  Distance from nasion to inion: 36.5  MT 1: 0 - 6 - 13 @ 52% on 12/21/2022  MT 2:  0 - 6 - 13 @ 53% on 1/20/2023    Stimulation Parameters:  Standard [facility]  Frequency: 18  Train Duration: 2  Total pulses delivered: 1980  Inter-train interval: 20  Tx Loc: 0 - eye - 15  Energy: 64% (120% MT)  Trains: 83  *Uses  spacer*    Date MADRS QIDS PHQ-9   12/21/22 38 22 24   12/30/22  22 20   01/06/23  19 21   01/13/23  24 24   01/20/23  23 18   01/27/23  20 17   02/03/23  20 19   02/10/23  18 18     Plan   - cont TMS    This service provided today was under the supervising provider of the day, Magnus Henriquez MD PhD, who was available if needed.    Lance Wheatley, TMS Technician  [facility] Neuromodulation

## 2023-02-13 ENCOUNTER — OFFICE VISIT (OUTPATIENT)
Dept: PSYCHIATRY | Facility: CLINIC | Age: 28
End: 2023-02-13
Payer: COMMERCIAL

## 2023-02-13 DIAGNOSIS — F33.2 SEVERE EPISODE OF RECURRENT MAJOR DEPRESSIVE DISORDER, WITHOUT PSYCHOTIC FEATURES (H): Primary | ICD-10-CM

## 2023-02-13 ASSESSMENT — PATIENT HEALTH QUESTIONNAIRE - PHQ9: SUM OF ALL RESPONSES TO PHQ QUESTIONS 1-9: 17

## 2023-02-13 NOTE — PROGRESS NOTES
Interventional Psychiatry Program  [address] [facility], [address]  [place] [address]  TMS Procedure Note   Nimisha Jasso MRN# [id number]  Age: 27 year old year old YOB: 1995    Pre-Procedure:  History and Physical: Reviewed in medical record  Consent Signed by: Nimisha Jasso for this course of treatment. On: 12/21/2022    Nimisha Jasso comes into clinic today at the request of Keven Downey MD, ordering provider for TMS.    Clinical Narrative:  Patient tolerated treatment. Patient reports no changes.    Indications for TMS:  MDD, recurrent, severe; 4+ medication trials (from 2+ classes) ineffective; Psychotherapy ineffective     Procedure Diagnosis:  Severe episode of recurrent major depressive disorder, without psychotic features (H)  (primary encounter diagnosis)    Treatment Hx:  Treatment this series: 30  Lifetime treatment number: 30    No Known Allergies   There were no vitals taken for this visit.    Pause for the Cause  Right patient:  Yes  Right procedure/correct coil:  Yes; rTMS; cpt 55564; H1 coil.   Earplugs in place:  Yes    Procedure  Patient was seated in procedure chair. Identity and procedure was verified. Ear plugs were placed in ears and patient-specific cap was placed on head and tightened appropriately. Ruler locations were verified. Coil was placed at treatment location and stimulator was set to parameters described below. A test train was delivered and pt tolerated train. Given pt tolerance, 83 treatment trains were delivered. Pt tolerated procedure with minimal facial and hand movement.    Motor Threshold Determination  Distance from nasion to inion: 36.5  MT 1: 0 - 6 - 13 @ 52% on 12/21/2022  MT 2:  0 - 6 - 13 @ 53% on 1/20/2023    Stimulation Parameters:  Standard [facility]  Frequency: 18  Train Duration: 2  Total pulses delivered: 1980  Inter-train interval: 20  Tx Loc: 0 - eye - 15  Energy: 64% (120% MT)  Trains: 83  *Uses spacer*    Date MADRS QIDS  PHQ-9   12/21/22 38 22 24   12/30/22  22 20   01/06/23  19 21   01/13/23  24 24   01/20/23  23 18   01/27/23  20 17   02/03/23  20 19   02/10/23  18 18     Plan   - cont TMS    This service provided today was under the supervising provider of the day, BETH Flores MD, who was available if needed.    Zoraida Swift, TMS Technician  [facility] Neuromodulation    Shelly Mills, RN  [facility]   less than or equal to 2 seconds

## 2023-02-14 ENCOUNTER — OFFICE VISIT (OUTPATIENT)
Dept: PSYCHIATRY | Facility: CLINIC | Age: 28
End: 2023-02-14
Payer: COMMERCIAL

## 2023-02-14 DIAGNOSIS — F33.2 SEVERE EPISODE OF RECURRENT MAJOR DEPRESSIVE DISORDER, WITHOUT PSYCHOTIC FEATURES (H): Primary | ICD-10-CM

## 2023-02-14 ASSESSMENT — PATIENT HEALTH QUESTIONNAIRE - PHQ9: SUM OF ALL RESPONSES TO PHQ QUESTIONS 1-9: 17

## 2023-02-14 NOTE — PROGRESS NOTES
Interventional Psychiatry Program  [address] [facility], [address]  [place] [address]  TMS Procedure Note   Nimisha Jasso MRN# [id number]  Age: 27 year old year old YOB: 1995    Pre-Procedure:  History and Physical: Reviewed in medical record  Consent Signed by: Nimisha Jasso for this course of treatment. On: 12/21/2022    Nimisha Jasso comes into clinic today at the request of Keven Downey MD, ordering provider for TMS.    Clinical Narrative:  Patient tolerated treatment. Patient reports no changes.    Indications for TMS:  MDD, recurrent, severe; 4+ medication trials (from 2+ classes) ineffective; Psychotherapy ineffective     Procedure Diagnosis:  Severe episode of recurrent major depressive disorder, without psychotic features (H)  (primary encounter diagnosis)    Treatment Hx:  Treatment this series: 31  Lifetime treatment number: 31    No Known Allergies   There were no vitals taken for this visit.    Pause for the Cause  Right patient:  Yes  Right procedure/correct coil:  Yes; rTMS; cpt 38394; H1 coil.   Earplugs in place:  Yes    Procedure  Patient was seated in procedure chair. Identity and procedure was verified. Ear plugs were placed in ears and patient-specific cap was placed on head and tightened appropriately. Ruler locations were verified. Coil was placed at treatment location and stimulator was set to parameters described below. A test train was delivered and pt tolerated train. Given pt tolerance, 83 treatment trains were delivered. Pt tolerated procedure with minimal facial and hand movement.    Motor Threshold Determination  Distance from nasion to inion: 36.5  MT 1: 0 - 6 - 13 @ 52% on 12/21/2022  MT 2:  0 - 6 - 13 @ 53% on 1/20/2023    Stimulation Parameters:  Standard [facility]  Frequency: 18  Train Duration: 2  Total pulses delivered: 1980  Inter-train interval: 20  Tx Loc: 0 - eye - 15  Energy: 64% (120% MT)  Trains: 83  *Uses spacer*    Date MADRS QIDS  PHQ-9   12/21/22 38 22 24   12/30/22  22 20   01/06/23  19 21   01/13/23  24 24   01/20/23  23 18   01/27/23  20 17   02/03/23  20 19   02/10/23  18 18     Plan   - cont TMS    This service provided today was under the supervising provider of the day, BETH Flores MD, who was available if needed.    Zoraida Swift, TMS Technician  [facility] Neuromodulation

## 2023-02-15 ENCOUNTER — OFFICE VISIT (OUTPATIENT)
Dept: PSYCHIATRY | Facility: CLINIC | Age: 28
End: 2023-02-15
Payer: COMMERCIAL

## 2023-02-15 DIAGNOSIS — F33.2 SEVERE EPISODE OF RECURRENT MAJOR DEPRESSIVE DISORDER, WITHOUT PSYCHOTIC FEATURES (H): Primary | ICD-10-CM

## 2023-02-15 ASSESSMENT — PATIENT HEALTH QUESTIONNAIRE - PHQ9: SUM OF ALL RESPONSES TO PHQ QUESTIONS 1-9: 18

## 2023-02-15 NOTE — PROGRESS NOTES
Interventional Psychiatry Program  [address] [facility], [address]  [place] [address]  TMS Procedure Note   Nimisha Jasso MRN# [id number]  Age: 27 year old year old YOB: 1995    Pre-Procedure:  History and Physical: Reviewed in medical record  Consent Signed by: Nimisha Jasso for this course of treatment. On: 12/21/2022    Nimisha Jasso comes into clinic today at the request of Keven Downey MD, ordering provider for TMS.    Clinical Narrative:  Patient tolerated treatment. Patient reports no changes.    Indications for TMS:  MDD, recurrent, severe; 4+ medication trials (from 2+ classes) ineffective; Psychotherapy ineffective     Procedure Diagnosis:  Severe episode of recurrent major depressive disorder, without psychotic features (H)  (primary encounter diagnosis)    Treatment Hx:  Treatment this series: 32  Lifetime treatment number: 32    No Known Allergies   There were no vitals taken for this visit.    Pause for the Cause  Right patient:  Yes  Right procedure/correct coil:  Yes; rTMS; cpt 54160; H1 coil.   Earplugs in place:  Yes    Procedure  Patient was seated in procedure chair. Identity and procedure was verified. Ear plugs were placed in ears and patient-specific cap was placed on head and tightened appropriately. Ruler locations were verified. Coil was placed at treatment location and stimulator was set to parameters described below. A test train was delivered and pt tolerated train. Given pt tolerance, 83 treatment trains were delivered. Pt tolerated procedure with minimal facial and hand movement.    Motor Threshold Determination  Distance from nasion to inion: 36.5  MT 1: 0 - 6 - 13 @ 52% on 12/21/2022  MT 2:  0 - 6 - 13 @ 53% on 1/20/2023    Stimulation Parameters:  Standard [facility]  Frequency: 18  Train Duration: 2  Total pulses delivered: 1980  Inter-train interval: 20  Tx Loc: 0 - eye - 15  Energy: 64% (120% MT)  Trains: 83  *Uses spacer*    Date MADRS QIDS  PHQ-9   12/21/22 38 22 24   12/30/22  22 20   01/06/23  19 21   01/13/23  24 24   01/20/23  23 18   01/27/23  20 17   02/03/23  20 19   02/10/23  18 18     Plan   - cont TMS    This service provided today was under the supervising provider of the day, Keven Downey MD, who was available if needed.    Zoraida Swift, TMS Technician  [facility] Neuromodulation

## 2023-02-16 ENCOUNTER — TELEPHONE (OUTPATIENT)
Dept: PSYCHIATRY | Facility: CLINIC | Age: 28
End: 2023-02-16

## 2023-02-16 ENCOUNTER — OFFICE VISIT (OUTPATIENT)
Dept: PSYCHIATRY | Facility: CLINIC | Age: 28
End: 2023-02-16
Payer: COMMERCIAL

## 2023-02-16 DIAGNOSIS — F33.2 SEVERE EPISODE OF RECURRENT MAJOR DEPRESSIVE DISORDER, WITHOUT PSYCHOTIC FEATURES (H): Primary | ICD-10-CM

## 2023-02-16 ASSESSMENT — PATIENT HEALTH QUESTIONNAIRE - PHQ9: SUM OF ALL RESPONSES TO PHQ QUESTIONS 1-9: 17

## 2023-02-16 NOTE — TELEPHONE ENCOUNTER
Writer called and discussed extension of TMS with patient.  They would like to see if it gets approved by insurance first before deciding on if they want to move forward with it.

## 2023-02-16 NOTE — PROGRESS NOTES
Interventional Psychiatry Program  [address] [facility], [address]  [place] [address]  TMS Procedure Note   Nimisha Jasso MRN# [id number]  Age: 27 year old year old YOB: 1995    Pre-Procedure:  History and Physical: Reviewed in medical record  Consent Signed by: Nimisha Jasso for this course of treatment. On: 12/21/2022    Nimisha Jasso comes into clinic today at the request of Keven Downey MD, ordering provider for TMS.    Clinical Narrative:  Patient tolerated treatment. Patient reports no changes.    Indications for TMS:  MDD, recurrent, severe; 4+ medication trials (from 2+ classes) ineffective; Psychotherapy ineffective     Procedure Diagnosis:  Severe episode of recurrent major depressive disorder, without psychotic features (H)  (primary encounter diagnosis)    Treatment Hx:  Treatment this series: 33  Lifetime treatment number: 33    No Known Allergies   There were no vitals taken for this visit.    Pause for the Cause  Right patient:  Yes  Right procedure/correct coil:  Yes; rTMS; cpt 84488; H1 coil.   Earplugs in place:  Yes    Procedure  Patient was seated in procedure chair. Identity and procedure was verified. Ear plugs were placed in ears and patient-specific cap was placed on head and tightened appropriately. Ruler locations were verified. Coil was placed at treatment location and stimulator was set to parameters described below. A test train was delivered and pt tolerated train. Given pt tolerance, 83 treatment trains were delivered. Pt tolerated procedure with minimal facial and hand movement.    Motor Threshold Determination  Distance from nasion to inion: 36.5  MT 1: 0 - 6 - 13 @ 52% on 12/21/2022  MT 2:  0 - 6 - 13 @ 53% on 1/20/2023    Stimulation Parameters:  Standard [facility]  Frequency: 18  Train Duration: 2  Total pulses delivered: 1980  Inter-train interval: 20  Tx Loc: 0 - eye - 15  Energy: 64% (120% MT)  Trains: 83  *Uses spacer*    Date MADRS QIDS  PHQ-9   12/21/22 38 22 24   12/30/22  22 20   01/06/23  19 21   01/13/23  24 24   01/20/23  23 18   01/27/23  20 17   02/03/23  20 19   02/10/23  18 18     Plan   - cont TMS    This service provided today was under the supervising provider of the day, BETH Flores MD, who was available if needed.    Zoraida Swift, TMS Technician  [facility] Neuromodulation

## 2023-02-17 ENCOUNTER — OFFICE VISIT (OUTPATIENT)
Dept: PSYCHIATRY | Facility: CLINIC | Age: 28
End: 2023-02-17
Payer: COMMERCIAL

## 2023-02-17 DIAGNOSIS — F33.2 SEVERE EPISODE OF RECURRENT MAJOR DEPRESSIVE DISORDER, WITHOUT PSYCHOTIC FEATURES (H): Primary | ICD-10-CM

## 2023-02-17 ASSESSMENT — PATIENT HEALTH QUESTIONNAIRE - PHQ9: SUM OF ALL RESPONSES TO PHQ QUESTIONS 1-9: 18

## 2023-02-17 NOTE — PROGRESS NOTES
Interventional Psychiatry Program  [address] [facility], [address]  [place] [address]  TMS Procedure Note   Nimisha Jasso MRN# [id number]  Age: 27 year old year old YOB: 1995    Pre-Procedure:  History and Physical: Reviewed in medical record  Consent Signed by: Nimisha Jasso for this course of treatment. On: 12/21/2022    Nimisha Jasso comes into clinic today at the request of Keven Downey MD, ordering provider for TMS.    Clinical Narrative:  Patient tolerated treatment. Patient reports no changes.    Indications for TMS:  MDD, recurrent, severe; 4+ medication trials (from 2+ classes) ineffective; Psychotherapy ineffective     Procedure Diagnosis:  Severe episode of recurrent major depressive disorder, without psychotic features (H)  (primary encounter diagnosis)    Treatment Hx:  Treatment this series: 34  Lifetime treatment number: 34    No Known Allergies   There were no vitals taken for this visit.    Pause for the Cause  Right patient:  Yes  Right procedure/correct coil:  Yes; rTMS; cpt 24650; H1 coil.   Earplugs in place:  Yes    Procedure  Patient was seated in procedure chair. Identity and procedure was verified. Ear plugs were placed in ears and patient-specific cap was placed on head and tightened appropriately. Ruler locations were verified. Coil was placed at treatment location and stimulator was set to parameters described below. A test train was delivered and pt tolerated train. Given pt tolerance, 83 treatment trains were delivered. Pt tolerated procedure with minimal facial and hand movement.    Motor Threshold Determination  Distance from nasion to inion: 36.5  MT 1: 0 - 6 - 13 @ 52% on 12/21/2022  MT 2:  0 - 6 - 13 @ 53% on 1/20/2023    Stimulation Parameters:  Standard [facility]  Frequency: 18  Train Duration: 2  Total pulses delivered: 1980  Inter-train interval: 20  Tx Loc: 0 - eye - 15  Energy: 64% (120% MT)  Trains: 83  *Uses spacer*    Date MADRS QIDS  PHQ-9   12/21/22 38 22 24   12/30/22  22 20   01/06/23  19 21   01/13/23  24 24   01/20/23  23 18   01/27/23  20 17   02/03/23  20 19   02/10/23  18 18   2/17/23  20 18     Plan   - cont TMS    This service provided today was under the supervising provider of the day, Mp Ledesma MD, who was available if needed.    Shelly Mills RN   [facility]  [facility] Neuromod[facility]

## 2023-02-20 ENCOUNTER — OFFICE VISIT (OUTPATIENT)
Dept: PSYCHIATRY | Facility: CLINIC | Age: 28
End: 2023-02-20
Payer: COMMERCIAL

## 2023-02-20 DIAGNOSIS — F33.2 SEVERE EPISODE OF RECURRENT MAJOR DEPRESSIVE DISORDER, WITHOUT PSYCHOTIC FEATURES (H): Primary | ICD-10-CM

## 2023-02-20 ASSESSMENT — PATIENT HEALTH QUESTIONNAIRE - PHQ9: SUM OF ALL RESPONSES TO PHQ QUESTIONS 1-9: 15

## 2023-02-20 NOTE — PROGRESS NOTES
Interventional Psychiatry Program  [address] [facility], [address]  [place] [address]  TMS Procedure Note   Nimisha Jasso MRN# [id number]  Age: 27 year old year old YOB: 1995    Pre-Procedure:  History and Physical: Reviewed in medical record  Consent Signed by: Nimisha Jasso for this course of treatment. On: 12/21/2022    Nimisha Jasso comes into clinic today at the request of Keven Downey MD, ordering provider for TMS.    Clinical Narrative:  Patient tolerated treatment. Patient reports no changes.    Indications for TMS:  MDD, recurrent, severe; 4+ medication trials (from 2+ classes) ineffective; Psychotherapy ineffective     Procedure Diagnosis:  Severe episode of recurrent major depressive disorder, without psychotic features (H)  (primary encounter diagnosis)    Treatment Hx:  Treatment this series: 35  Lifetime treatment number: 35    No Known Allergies   There were no vitals taken for this visit.    Pause for the Cause  Right patient:  Yes  Right procedure/correct coil:  Yes; rTMS; cpt 30329; H1 coil.   Earplugs in place:  Yes    Procedure  Patient was seated in procedure chair. Identity and procedure was verified. Ear plugs were placed in ears and patient-specific cap was placed on head and tightened appropriately. Ruler locations were verified. Coil was placed at treatment location and stimulator was set to parameters described below. A test train was delivered and pt tolerated train. Given pt tolerance, 83 treatment trains were delivered. Pt tolerated procedure with minimal facial and hand movement.    Motor Threshold Determination  Distance from nasion to inion: 36.5  MT 1: 0 - 6 - 13 @ 52% on 12/21/2022  MT 2:  0 - 6 - 13 @ 53% on 1/20/2023    Stimulation Parameters:  Standard [facility]  Frequency: 18  Train Duration: 2  Total pulses delivered: 1980  Inter-train interval: 20  Tx Loc: 0 - eye - 15  Energy: 64% (120% MT)  Trains: 83  *Uses spacer*    Date MADRS QIDS  PHQ-9   12/21/22 38 22 24   12/30/22  22 20   01/06/23  19 21   01/13/23  24 24   01/20/23  23 18   01/27/23  20 17   02/03/23  20 19   02/10/23  18 18   2/17/23  20 18     Plan   - cont TMS    This service provided today was under the supervising provider of the day, Mp Ledesma MD, who was available if needed.    Zoraida Swift, TMS Technician   [facility] Neuromodulation

## 2023-02-21 ENCOUNTER — OFFICE VISIT (OUTPATIENT)
Dept: PSYCHIATRY | Facility: CLINIC | Age: 28
End: 2023-02-21
Payer: COMMERCIAL

## 2023-02-21 DIAGNOSIS — F33.2 SEVERE EPISODE OF RECURRENT MAJOR DEPRESSIVE DISORDER, WITHOUT PSYCHOTIC FEATURES (H): Primary | ICD-10-CM

## 2023-02-21 ASSESSMENT — PATIENT HEALTH QUESTIONNAIRE - PHQ9: SUM OF ALL RESPONSES TO PHQ QUESTIONS 1-9: 17

## 2023-02-21 NOTE — PROGRESS NOTES
Interventional Psychiatry Program  [address] [facility], [address]  [place] [address]  TMS Procedure Note   Nimisha Jasso MRN# [id number]  Age: 27 year old year old YOB: 1995    Pre-Procedure:  History and Physical: Reviewed in medical record  Consent Signed by: Nimisha Jasso for this course of treatment. On: 12/21/2022    Nimisha Jasso comes into clinic today at the request of Keven Downey MD, ordering provider for TMS.    Clinical Narrative:  Patient tolerated treatment. Patient reports no changes.    Indications for TMS:  MDD, recurrent, severe; 4+ medication trials (from 2+ classes) ineffective; Psychotherapy ineffective     Procedure Diagnosis:  Severe episode of recurrent major depressive disorder, without psychotic features (H)  (primary encounter diagnosis)    Treatment Hx:  Treatment this series: 36  Lifetime treatment number: 36    No Known Allergies   There were no vitals taken for this visit.    Pause for the Cause  Right patient:  Yes  Right procedure/correct coil:  Yes; rTMS; cpt 63025; H1 coil.   Earplugs in place:  Yes    Procedure  Patient was seated in procedure chair. Identity and procedure was verified. Ear plugs were placed in ears and patient-specific cap was placed on head and tightened appropriately. Ruler locations were verified. Coil was placed at treatment location and stimulator was set to parameters described below. A test train was delivered and pt tolerated train. Given pt tolerance, 83 treatment trains were delivered. Pt tolerated procedure with minimal facial and hand movement.    Motor Threshold Determination  Distance from nasion to inion: 36.5  MT 1: 0 - 6 - 13 @ 52% on 12/21/2022  MT 2:  0 - 6 - 13 @ 53% on 1/20/2023    Stimulation Parameters:  Standard [facility]  Frequency: 18  Train Duration: 2  Total pulses delivered: 1980  Inter-train interval: 20  Tx Loc: 0 - eye - 15  Energy: 64% (120% MT)  Trains: 83  *Uses spacer*    Date MADRS QIDS  PHQ-9   12/21/22 38 22 24   12/30/22  22 20   01/06/23  19 21   01/13/23  24 24   01/20/23  23 18   01/27/23  20 17   02/03/23  20 19   02/10/23  18 18   2/17/23  20 18   2/21/23 38  17     Plan   - cont TMS    This service provided today was under the supervising provider of the day, BETH Flores MD, who was available if needed.    Zoraida Swift, TMS Technician   [facility] Neuromodulation

## 2023-02-22 ENCOUNTER — TELEPHONE (OUTPATIENT)
Dept: FAMILY MEDICINE | Facility: CLINIC | Age: 28
End: 2023-02-22
Payer: COMMERCIAL

## 2023-02-22 NOTE — TELEPHONE ENCOUNTER
2/22/2023 called, no answer, unable to leave message. Sent message through [employer] that appt was cancelled due to weather, and to call us back to reschedule.

## 2023-02-23 ENCOUNTER — TELEPHONE (OUTPATIENT)
Dept: FAMILY MEDICINE | Facility: CLINIC | Age: 28
End: 2023-02-23
Payer: COMMERCIAL

## 2023-02-23 NOTE — TELEPHONE ENCOUNTER
2/23/2023 called and informed pt that 1pm appt is no longer available. Pt will reschedule sometime next week since they don't prefer a specific provider.

## 2023-03-03 ENCOUNTER — OFFICE VISIT (OUTPATIENT)
Dept: PSYCHIATRY | Facility: CLINIC | Age: 28
End: 2023-03-03
Payer: COMMERCIAL

## 2023-03-03 DIAGNOSIS — F33.2 SEVERE EPISODE OF RECURRENT MAJOR DEPRESSIVE DISORDER, WITHOUT PSYCHOTIC FEATURES (H): Primary | ICD-10-CM

## 2023-03-03 ASSESSMENT — PATIENT HEALTH QUESTIONNAIRE - PHQ9: SUM OF ALL RESPONSES TO PHQ QUESTIONS 1-9: 23

## 2023-03-03 NOTE — PROGRESS NOTES
Interventional Psychiatry Program  [address] [facility], [address]  [place] [address]  TMS Procedure Note   Nimisha Jasso MRN# [id number]  Age: 27 year old year old YOB: 1995    Pre-Procedure:  History and Physical: Reviewed in medical record  Consent Signed by: Nimisha Jasso for this course of treatment. On: 12/21/2022    Nimisha Jasso comes into clinic today at the request of Keven Downey MD, ordering provider for TMS.    Clinical Narrative:  Patient tolerated treatment. Patient reports they have not been doing well since their last day of treatment and are feeling more depressed.    Indications for TMS:  MDD, recurrent, severe; 4+ medication trials (from 2+ classes) ineffective; Psychotherapy ineffective     Procedure Diagnosis:  Severe episode of recurrent major depressive disorder, without psychotic features (H)  (primary encounter diagnosis)    Treatment Hx:  Treatment this series: 37  Lifetime treatment number: 37    No Known Allergies   There were no vitals taken for this visit.    Pause for the Cause  Right patient:  Yes  Right procedure/correct coil:  Yes; rTMS; cpt 32585; H1 coil.   Earplugs in place:  Yes    Procedure  Patient was seated in procedure chair. Identity and procedure was verified. Ear plugs were placed in ears and patient-specific cap was placed on head and tightened appropriately. Ruler locations were verified. Coil was placed at treatment location and stimulator was set to parameters described below. A test train was delivered and pt tolerated train. Given pt tolerance, 83 treatment trains were delivered. Pt tolerated procedure with minimal facial and hand movement.    Motor Threshold Determination  Distance from nasion to inion: 36.5  MT 1: 0 - 6 - 13 @ 52% on 12/21/2022  MT 2:  0 - 6 - 13 @ 53% on 1/20/2023    Stimulation Parameters:  Standard [facility]  Frequency: 18  Train Duration: 2  Total pulses delivered: 2988  Inter-train interval: 20  Tx Loc:  0 - eye - 15  Energy: 63% (120% MT)  Trains: 83  *Uses spacer*    Date MADRS QIDS PHQ-9   12/21/22 38 22 24   12/30/22  22 20   01/06/23  19 21   01/13/23  24 24   01/20/23  23 18   01/27/23  20 17   02/03/23  20 19   02/10/23  18 18   2/17/23  20 18   2/21/23 38  17   03/03/23  22 23     Plan   - cont TMS    Zoraida Swift, TMS Technician   [facility] Neuromodulation

## 2023-03-06 ENCOUNTER — OFFICE VISIT (OUTPATIENT)
Dept: PSYCHIATRY | Facility: CLINIC | Age: 28
End: 2023-03-06
Payer: COMMERCIAL

## 2023-03-06 DIAGNOSIS — F33.2 SEVERE EPISODE OF RECURRENT MAJOR DEPRESSIVE DISORDER, WITHOUT PSYCHOTIC FEATURES (H): Primary | ICD-10-CM

## 2023-03-06 ASSESSMENT — PATIENT HEALTH QUESTIONNAIRE - PHQ9: SUM OF ALL RESPONSES TO PHQ QUESTIONS 1-9: 21

## 2023-03-06 NOTE — PROGRESS NOTES
Interventional Psychiatry Program  [address] [facility], [address]  [place] [address]  TMS Procedure Note   Nimisha Jasso MRN# [id number]  Age: 27 year old year old YOB: 1995    Pre-Procedure:  History and Physical: Reviewed in medical record  Consent Signed by: Nimisha Jasso for this course of treatment. On: 12/21/2022    Nimisha Jasso comes into clinic today at the request of Keven Downey MD, ordering provider for TMS.    Clinical Narrative:  Patient tolerated treatment. Patient reports no changes.    Indications for TMS:  MDD, recurrent, severe; 4+ medication trials (from 2+ classes) ineffective; Psychotherapy ineffective     Procedure Diagnosis:  Severe episode of recurrent major depressive disorder, without psychotic features (H)  (primary encounter diagnosis)    Treatment Hx:  Treatment this series: 38  Lifetime treatment number: 38    No Known Allergies   There were no vitals taken for this visit.    Pause for the Cause  Right patient:  Yes  Right procedure/correct coil:  Yes; rTMS; cpt 60000; H1 coil.   Earplugs in place:  Yes    Procedure  Patient was seated in procedure chair. Identity and procedure was verified. Ear plugs were placed in ears and patient-specific cap was placed on head and tightened appropriately. Ruler locations were verified. Coil was placed at treatment location and stimulator was set to parameters described below. A test train was delivered and pt tolerated train. Given pt tolerance, 83 treatment trains were delivered. Pt tolerated procedure with minimal facial and hand movement.    Motor Threshold Determination  Distance from nasion to inion: 36.5  MT 1: 0 - 6 - 13 @ 52% on 12/21/2022  MT 2:  0 - 6 - 13 @ 53% on 1/20/2023    Stimulation Parameters:  Standard [facility]  Frequency: 18  Train Duration: 2  Total pulses delivered: 2988  Inter-train interval: 20  Tx Loc: 0 - eye - 15  Energy: 63% (120% MT)  Trains: 83  *Uses spacer*    Date MADRS QIDS  PHQ-9   12/21/22 38 22 24   12/30/22  22 20   01/06/23  19 21   01/13/23  24 24   01/20/23  23 18   01/27/23  20 17   02/03/23  20 19   02/10/23  18 18   2/17/23  20 18   2/21/23 38  17   03/03/23  22 23     Plan   - cont TMS    This service provided today was under the supervision of, Mp Ledesma MD, who was available if needed.    Zoraida Swift, TMS Technician   [facility] Neuromodulation

## 2023-03-07 ENCOUNTER — OFFICE VISIT (OUTPATIENT)
Dept: PSYCHIATRY | Facility: CLINIC | Age: 28
End: 2023-03-07
Payer: COMMERCIAL

## 2023-03-07 DIAGNOSIS — F33.2 SEVERE EPISODE OF RECURRENT MAJOR DEPRESSIVE DISORDER, WITHOUT PSYCHOTIC FEATURES (H): Primary | ICD-10-CM

## 2023-03-07 ASSESSMENT — PATIENT HEALTH QUESTIONNAIRE - PHQ9: SUM OF ALL RESPONSES TO PHQ QUESTIONS 1-9: 21

## 2023-03-07 NOTE — PROGRESS NOTES
Interventional Psychiatry Program  [address] [facility], [address]  [place] [address]  TMS Procedure Note   Nimisha Jasso MRN# [id number]  Age: 27 year old year old YOB: 1995    Pre-Procedure:  History and Physical: Reviewed in medical record  Consent Signed by: Nimisha Jasso for this course of treatment. On: 12/21/2022    Nimisha Jasso comes into clinic today at the request of Keven Downey MD, ordering provider for TMS.    Clinical Narrative:  Patient tolerated treatment. Patient reports no changes.    Indications for TMS:  MDD, recurrent, severe; 4+ medication trials (from 2+ classes) ineffective; Psychotherapy ineffective     Procedure Diagnosis:  Severe episode of recurrent major depressive disorder, without psychotic features (H)  (primary encounter diagnosis)    Treatment Hx:  Treatment this series: 39  Lifetime treatment number: 39    No Known Allergies   There were no vitals taken for this visit.    Pause for the Cause  Right patient:  Yes  Right procedure/correct coil:  Yes; rTMS; cpt 36432; H1 coil.   Earplugs in place:  Yes    Procedure  Patient was seated in procedure chair. Identity and procedure was verified. Ear plugs were placed in ears and patient-specific cap was placed on head and tightened appropriately. Ruler locations were verified. Coil was placed at treatment location and stimulator was set to parameters described below. A test train was delivered and pt tolerated train. Given pt tolerance, 83 treatment trains were delivered. Pt tolerated procedure with minimal facial and hand movement.    Motor Threshold Determination  Distance from nasion to inion: 36.5  MT 1: 0 - 6 - 13 @ 52% on 12/21/2022  MT 2:  0 - 6 - 13 @ 53% on 1/20/2023    Stimulation Parameters:  Standard [facility]  Frequency: 18  Train Duration: 2  Total pulses delivered: 2988  Inter-train interval: 20  Tx Loc: 0 - eye - 15  Energy: 63% (120% MT)  Trains: 83  *Uses spacer*    Date MADRS QIDS  PHQ-9   12/21/22 38 22 24   12/30/22  22 20   01/06/23  19 21   01/13/23  24 24   01/20/23  23 18   01/27/23  20 17   02/03/23  20 19   02/10/23  18 18   2/17/23  20 18   2/21/23 38  17   03/03/23  22 23     Plan   - cont TMS    This service provided today was under the supervision of, BETH Flores MD, who was available if needed.    Zoraida Swift, TMS Technician   [facility] Neuromodulation

## 2023-03-08 ENCOUNTER — OFFICE VISIT (OUTPATIENT)
Dept: PSYCHIATRY | Facility: CLINIC | Age: 28
End: 2023-03-08
Payer: COMMERCIAL

## 2023-03-08 DIAGNOSIS — F33.2 SEVERE EPISODE OF RECURRENT MAJOR DEPRESSIVE DISORDER, WITHOUT PSYCHOTIC FEATURES (H): Primary | ICD-10-CM

## 2023-03-08 ASSESSMENT — PATIENT HEALTH QUESTIONNAIRE - PHQ9: SUM OF ALL RESPONSES TO PHQ QUESTIONS 1-9: 21

## 2023-03-08 NOTE — PROGRESS NOTES
Interventional Psychiatry Program  [address] [facility], [address]  [place] [address]  TMS Procedure Note   Nimisha Jasso MRN# [id number]  Age: 27 year old year old YOB: 1995    Pre-Procedure:  History and Physical: Reviewed in medical record  Consent Signed by: Nimisha Jasso for this course of treatment. On: 12/21/2022    Nimisha Jasso comes into clinic today at the request of Keven Downey MD, ordering provider for TMS.    Clinical Narrative:  Patient tolerated treatment. Patient reports no changes.    Indications for TMS:  MDD, recurrent, severe; 4+ medication trials (from 2+ classes) ineffective; Psychotherapy ineffective     Procedure Diagnosis:  Severe episode of recurrent major depressive disorder, without psychotic features (H)  (primary encounter diagnosis)    Treatment Hx:  Treatment this series: 40  Lifetime treatment number: 40    No Known Allergies   There were no vitals taken for this visit.    Pause for the Cause  Right patient:  Yes  Right procedure/correct coil:  Yes; rTMS; cpt 35214; H1 coil.   Earplugs in place:  Yes    Procedure  Patient was seated in procedure chair. Identity and procedure was verified. Ear plugs were placed in ears and patient-specific cap was placed on head and tightened appropriately. Ruler locations were verified. Coil was placed at treatment location and stimulator was set to parameters described below. A test train was delivered and pt tolerated train. Given pt tolerance, 83 treatment trains were delivered. Pt tolerated procedure with minimal facial and hand movement.    Motor Threshold Determination  Distance from nasion to inion: 36.5  MT 1: 0 - 6 - 13 @ 52% on 12/21/2022  MT 2:  0 - 6 - 13 @ 53% on 1/20/2023    Stimulation Parameters:  Standard [facility]  Frequency: 18  Train Duration: 2  Total pulses delivered: 2988  Inter-train interval: 20  Tx Loc: 0 - eye - 15  Energy: 63% (120% MT)  Trains: 83  *Uses spacer*    Date MADRS QIDS  PHQ-9   12/21/22 38 22 24   12/30/22  22 20   01/06/23  19 21   01/13/23  24 24   01/20/23  23 18   01/27/23  20 17   02/03/23  20 19   02/10/23  18 18   2/17/23  20 18   2/21/23 38  17   03/03/23  22 23     Plan   - cont TMS    This service provided today was under the supervision of, Keven Downey MD, who was available if needed.    Zoraida Swift, TMS Technician   [facility] Neuromodulation

## 2023-03-09 ENCOUNTER — OFFICE VISIT (OUTPATIENT)
Dept: PSYCHIATRY | Facility: CLINIC | Age: 28
End: 2023-03-09
Payer: COMMERCIAL

## 2023-03-09 DIAGNOSIS — F33.2 SEVERE EPISODE OF RECURRENT MAJOR DEPRESSIVE DISORDER, WITHOUT PSYCHOTIC FEATURES (H): Primary | ICD-10-CM

## 2023-03-09 ASSESSMENT — PATIENT HEALTH QUESTIONNAIRE - PHQ9: SUM OF ALL RESPONSES TO PHQ QUESTIONS 1-9: 20

## 2023-03-09 NOTE — PROGRESS NOTES
Interventional Psychiatry Program  [address] [facility], [address]  [place] [address]  TMS Procedure Note   Nimisha Jasso MRN# [id number]  Age: 27 year old year old YOB: 1995    Pre-Procedure:  History and Physical: Reviewed in medical record  Consent Signed by: Nimisha Jasso for this course of treatment. On: 12/21/2022    Nimisha Jasso comes into clinic today at the request of Keven Downey MD, ordering provider for TMS.    Clinical Narrative:  Patient tolerated treatment. Patient reports no changes.    Indications for TMS:  MDD, recurrent, severe; 4+ medication trials (from 2+ classes) ineffective; Psychotherapy ineffective     Procedure Diagnosis:  Severe episode of recurrent major depressive disorder, without psychotic features (H)  (primary encounter diagnosis)    Treatment Hx:  Treatment this series: 41  Lifetime treatment number: 41    No Known Allergies   There were no vitals taken for this visit.    Pause for the Cause  Right patient:  Yes  Right procedure/correct coil:  Yes; rTMS; cpt 30031; H1 coil.   Earplugs in place:  Yes    Procedure  Patient was seated in procedure chair. Identity and procedure was verified. Ear plugs were placed in ears and patient-specific cap was placed on head and tightened appropriately. Ruler locations were verified. Coil was placed at treatment location and stimulator was set to parameters described below. A test train was delivered and pt tolerated train. Given pt tolerance, 83 treatment trains were delivered. Pt tolerated procedure with minimal facial and hand movement.    Motor Threshold Determination  Distance from nasion to inion: 36.5  MT 1: 0 - 6 - 13 @ 52% on 12/21/2022  MT 2:  0 - 6 - 13 @ 53% on 1/20/2023    Stimulation Parameters:  Standard [facility]  Frequency: 18  Train Duration: 2  Total pulses delivered: 2988  Inter-train interval: 20  Tx Loc: 0 - eye - 15  Energy: 63% (120% MT)  Trains: 83  *Uses spacer*    Date MADRS QIDS  PHQ-9   12/21/22 38 22 24   12/30/22  22 20   01/06/23  19 21   01/13/23  24 24   01/20/23  23 18   01/27/23  20 17   02/03/23  20 19   02/10/23  18 18   2/17/23  20 18   2/21/23 38  17   03/03/23  22 23     Plan   - cont TMS    This service provided today was under the supervision of, Keven Downey MD, who was available if needed.    Shelly Mills RN   [facility] Neuromodulation

## 2023-03-10 ENCOUNTER — OFFICE VISIT (OUTPATIENT)
Dept: PSYCHIATRY | Facility: CLINIC | Age: 28
End: 2023-03-10
Payer: COMMERCIAL

## 2023-03-10 DIAGNOSIS — F33.2 SEVERE EPISODE OF RECURRENT MAJOR DEPRESSIVE DISORDER, WITHOUT PSYCHOTIC FEATURES (H): Primary | ICD-10-CM

## 2023-03-10 ASSESSMENT — PATIENT HEALTH QUESTIONNAIRE - PHQ9: SUM OF ALL RESPONSES TO PHQ QUESTIONS 1-9: 20

## 2023-03-10 NOTE — PROGRESS NOTES
Interventional Psychiatry Program  [address] [facility], [address]  [place] [address]  TMS Procedure Note   Nimisha Jasso MRN# [id number]  Age: 27 year old year old YOB: 1995    Pre-Procedure:  History and Physical: Reviewed in medical record  Consent Signed by: Nimisha Jasso for this course of treatment. On: 12/21/2022    Nimisha Jasso comes into clinic today at the request of Keven Downey MD, ordering provider for TMS.    Clinical Narrative:  Patient tolerated treatment. Patient reports no changes.    Indications for TMS:  MDD, recurrent, severe; 4+ medication trials (from 2+ classes) ineffective; Psychotherapy ineffective     Procedure Diagnosis:  Severe episode of recurrent major depressive disorder, without psychotic features (H)  (primary encounter diagnosis)    Treatment Hx:  Treatment this series: 42  Lifetime treatment number: 42    No Known Allergies   There were no vitals taken for this visit.    Pause for the Cause  Right patient:  Yes  Right procedure/correct coil:  Yes; rTMS; cpt 32886; H1 coil.   Earplugs in place:  Yes    Procedure  Patient was seated in procedure chair. Identity and procedure was verified. Ear plugs were placed in ears and patient-specific cap was placed on head and tightened appropriately. Ruler locations were verified. Coil was placed at treatment location and stimulator was set to parameters described below. A test train was delivered and pt tolerated train. Given pt tolerance, 83 treatment trains were delivered. Pt tolerated procedure with minimal facial and hand movement.    Motor Threshold Determination  Distance from nasion to inion: 36.5  MT 1: 0 - 6 - 13 @ 52% on 12/21/2022  MT 2:  0 - 6 - 13 @ 53% on 1/20/2023    Stimulation Parameters:  Standard [facility]  Frequency: 18  Train Duration: 2  Total pulses delivered: 2988  Inter-train interval: 20  Tx Loc: 0 - eye - 15  Energy: 63% (120% MT)  Trains: 83  *Uses spacer*    Date MADRS QIDS  PHQ-9   12/21/22 38 22 24   12/30/22  22 20   01/06/23  19 21   01/13/23  24 24   01/20/23  23 18   01/27/23  20 17   02/03/23  20 19   02/10/23  18 18   2/17/23  20 18   2/21/23 38  17   03/03/23  22 23   3/10/23  23 20     Plan   - cont TMS    This service provided today was under the supervision of, Magnus Henriquez MD, PhD, who was available if needed.    Shelly Mills RN   [facility]  [facility] [place] [facility]

## 2023-03-13 ENCOUNTER — OFFICE VISIT (OUTPATIENT)
Dept: PSYCHIATRY | Facility: CLINIC | Age: 28
End: 2023-03-13
Payer: COMMERCIAL

## 2023-03-13 DIAGNOSIS — F33.2 SEVERE EPISODE OF RECURRENT MAJOR DEPRESSIVE DISORDER, WITHOUT PSYCHOTIC FEATURES (H): Primary | ICD-10-CM

## 2023-03-13 ASSESSMENT — PATIENT HEALTH QUESTIONNAIRE - PHQ9: SUM OF ALL RESPONSES TO PHQ QUESTIONS 1-9: 19

## 2023-03-13 NOTE — PROGRESS NOTES
Interventional Psychiatry Program  [address] [facility], [address]  [place] [address]  TMS Procedure Note   Nimisha Jasso MRN# [id number]  Age: 27 year old year old   YOB: 1995    Pre-Procedure:  History and Physical: Reviewed in medical record  Consent Signed by: Nimisha Jasso for this course of treatment. On: 12/21/2022    Nimisha Jasso comes into clinic today at the request of Keven Downey MD, ordering provider for TMS.    Clinical Narrative:  Patient tolerated treatment. Patient reports no changes.    Indications for TMS:  MDD, recurrent, severe; 4+ medication trials (from 2+ classes) ineffective; Psychotherapy ineffective     Procedure Diagnosis:  Severe episode of recurrent major depressive disorder, without psychotic features (H)  (primary encounter diagnosis)    Treatment Hx:  Treatment this series: 43  Lifetime treatment number: 43    No Known Allergies   There were no vitals taken for this visit.    Pause for the Cause  Right patient:  Yes  Right procedure/correct coil:  Yes; rTMS; cpt 99671; H1 coil.   Earplugs in place:  Yes    Procedure  Patient was seated in procedure chair. Identity and procedure was verified. Ear plugs were placed in ears and patient-specific cap was placed on head and tightened appropriately. Ruler locations were verified. Coil was placed at treatment location and stimulator was set to parameters described below. A test train was delivered and pt tolerated train. Given pt tolerance, 83 treatment trains were delivered. Pt tolerated procedure with minimal facial and hand movement.    Motor Threshold Determination  Distance from nasion to inion: 36.5  MT 1: 0 - 6 - 13 @ 52% on 12/21/2022  MT 2:  0 - 6 - 13 @ 53% on 1/20/2023    Stimulation Parameters:  Standard [facility]  Frequency: 18  Train Duration: 2  Total pulses delivered: 2988  Inter-train interval: 20  Tx Loc: 0 - eye - 15  Energy: 63% (120% MT)  Trains: 83  *Uses spacer*    Date MADRS QIDS  PHQ-9   12/21/22 38 22 24   12/30/22  22 20   01/06/23  19 21   01/13/23  24 24   01/20/23  23 18   01/27/23  20 17   02/03/23  20 19   02/10/23  18 18   2/17/23  20 18   2/21/23 38  17   03/03/23  22 23   3/10/23  23 20     Plan   - cont TMS    This service provided today was under the supervision of, EFFIE Perales MD, who was available if needed.    Zoraida Swift, TMS Technician  [facility] Neuromodulation

## 2023-03-14 ENCOUNTER — OFFICE VISIT (OUTPATIENT)
Dept: PSYCHIATRY | Facility: CLINIC | Age: 28
End: 2023-03-14
Payer: COMMERCIAL

## 2023-03-14 DIAGNOSIS — F33.2 SEVERE EPISODE OF RECURRENT MAJOR DEPRESSIVE DISORDER, WITHOUT PSYCHOTIC FEATURES (H): Primary | ICD-10-CM

## 2023-03-14 ASSESSMENT — PATIENT HEALTH QUESTIONNAIRE - PHQ9: SUM OF ALL RESPONSES TO PHQ QUESTIONS 1-9: 19

## 2023-03-14 NOTE — PROGRESS NOTES
Interventional Psychiatry Program  [address] [facility], [address]  [place] [address]  TMS Procedure Note   Nimisha Jasso MRN# [id number]  Age: 27 year old year old   YOB: 1995    Pre-Procedure:  History and Physical: Reviewed in medical record  Consent Signed by: Nimisha Jasso for this course of treatment. On: 12/21/2022    Nimisha Jasso comes into clinic today at the request of Keven Downey MD, ordering provider for TMS.    Clinical Narrative:  Patient tolerated treatment. Patient reports no changes.    Indications for TMS:  MDD, recurrent, severe; 4+ medication trials (from 2+ classes) ineffective; Psychotherapy ineffective     Procedure Diagnosis:  Severe episode of recurrent major depressive disorder, without psychotic features (H)  (primary encounter diagnosis)    Treatment Hx:  Treatment this series: 44  Lifetime treatment number: 44    No Known Allergies   There were no vitals taken for this visit.    Pause for the Cause  Right patient:  Yes  Right procedure/correct coil:  Yes; rTMS; cpt 05469; H1 coil.   Earplugs in place:  Yes    Procedure  Patient was seated in procedure chair. Identity and procedure was verified. Ear plugs were placed in ears and patient-specific cap was placed on head and tightened appropriately. Ruler locations were verified. Coil was placed at treatment location and stimulator was set to parameters described below. A test train was delivered and pt tolerated train. Given pt tolerance, 83 treatment trains were delivered. Pt tolerated procedure with minimal facial and hand movement.    Motor Threshold Determination  Distance from nasion to inion: 36.5  MT 1: 0 - 6 - 13 @ 52% on 12/21/2022  MT 2:  0 - 6 - 13 @ 53% on 1/20/2023    Stimulation Parameters:  Standard [facility]  Frequency: 18  Train Duration: 2  Total pulses delivered: 2988  Inter-train interval: 20  Tx Loc: 0 - eye - 15  Energy: 63% (120% MT)  Trains: 83  *Uses spacer*    Date MADRS QIDS  PHQ-9   12/21/22 38 22 24   12/30/22  22 20   01/06/23  19 21   01/13/23  24 24   01/20/23  23 18   01/27/23  20 17   02/03/23  20 19   02/10/23  18 18   2/17/23  20 18   2/21/23 38  17   03/03/23  22 23   3/10/23  23 20     Plan   - cont TMS    This service provided today was under the supervision of, BETH Flores MD, who was available if needed.    Zoraida Swift, TMS Technician  [facility] Neuromodulation

## 2023-03-15 ENCOUNTER — OFFICE VISIT (OUTPATIENT)
Dept: PSYCHIATRY | Facility: CLINIC | Age: 28
End: 2023-03-15
Payer: COMMERCIAL

## 2023-03-15 DIAGNOSIS — F33.2 SEVERE EPISODE OF RECURRENT MAJOR DEPRESSIVE DISORDER, WITHOUT PSYCHOTIC FEATURES (H): Primary | ICD-10-CM

## 2023-03-15 ASSESSMENT — PATIENT HEALTH QUESTIONNAIRE - PHQ9: SUM OF ALL RESPONSES TO PHQ QUESTIONS 1-9: 19

## 2023-03-15 NOTE — PROGRESS NOTES
Interventional Psychiatry Program  [address] [facility], [address]  [place] [address]  TMS Procedure Note   Nimisha Jasso MRN# [id number]  Age: 27 year old year old   YOB: 1995    Pre-Procedure:  History and Physical: Reviewed in medical record  Consent Signed by: Nimisha Jasso for this course of treatment. On: 12/21/2022    Nimisha Jasso comes into clinic today at the request of Keven Downey MD, ordering provider for TMS.    Clinical Narrative:  Patient tolerated treatment. Patient reports no changes.    Indications for TMS:  MDD, recurrent, severe; 4+ medication trials (from 2+ classes) ineffective; Psychotherapy ineffective     Procedure Diagnosis:  Severe episode of recurrent major depressive disorder, without psychotic features (H)  (primary encounter diagnosis)    Treatment Hx:  Treatment this series: 45  Lifetime treatment number: 45    No Known Allergies   There were no vitals taken for this visit.    Pause for the Cause  Right patient:  Yes  Right procedure/correct coil:  Yes; rTMS; cpt 00213; H1 coil.   Earplugs in place:  Yes    Procedure  Patient was seated in procedure chair. Identity and procedure was verified. Ear plugs were placed in ears and patient-specific cap was placed on head and tightened appropriately. Ruler locations were verified. Coil was placed at treatment location and stimulator was set to parameters described below. A test train was delivered and pt tolerated train. Given pt tolerance, 83 treatment trains were delivered. Pt tolerated procedure with minimal facial and hand movement.    Motor Threshold Determination  Distance from nasion to inion: 36.5  MT 1: 0 - 6 - 13 @ 52% on 12/21/2022  MT 2:  0 - 6 - 13 @ 53% on 1/20/2023    Stimulation Parameters:  Standard [facility]  Frequency: 18  Train Duration: 2  Total pulses delivered: 2988  Inter-train interval: 20  Tx Loc: 0 - eye - 15  Energy: 63% (120% MT)  Trains: 83  *Uses spacer*    Date MADRS QIDS  PHQ-9   12/21/22 38 22 24   12/30/22  22 20   01/06/23  19 21   01/13/23  24 24   01/20/23  23 18   01/27/23  20 17   02/03/23  20 19   02/10/23  18 18   2/17/23  20 18   2/21/23 38  17   03/03/23  22 23   3/10/23  23 20     Plan   - cont TMS    This service provided today was under the supervision of, Keven Downey MD, who was available if needed.    Zoraida Swift, TMS Technician  [facility] Neuromodulation

## 2023-03-16 ENCOUNTER — OFFICE VISIT (OUTPATIENT)
Dept: PSYCHIATRY | Facility: CLINIC | Age: 28
End: 2023-03-16
Payer: COMMERCIAL

## 2023-03-16 DIAGNOSIS — F33.2 SEVERE EPISODE OF RECURRENT MAJOR DEPRESSIVE DISORDER, WITHOUT PSYCHOTIC FEATURES (H): Primary | ICD-10-CM

## 2023-03-16 ASSESSMENT — PATIENT HEALTH QUESTIONNAIRE - PHQ9: SUM OF ALL RESPONSES TO PHQ QUESTIONS 1-9: 18

## 2023-03-16 NOTE — PROGRESS NOTES
Interventional Psychiatry Program  [address] [facility], [address]  [place] [address]  TMS Procedure Note   Nimisha Jasso MRN# [id number]  Age: 27 year old year old   YOB: 1995    Pre-Procedure:  History and Physical: Reviewed in medical record  Consent Signed by: Nimisha Jasso for this course of treatment. On: 12/21/2022    Nimisha Jasso comes into clinic today at the request of Keven Downey MD, ordering provider for TMS.    Clinical Narrative:  Patient tolerated treatment. Patient reports no changes.    Indications for TMS:  MDD, recurrent, severe; 4+ medication trials (from 2+ classes) ineffective; Psychotherapy ineffective     Procedure Diagnosis:  Severe episode of recurrent major depressive disorder, without psychotic features (H)  (primary encounter diagnosis)    Treatment Hx:  Treatment this series: 46  Lifetime treatment number: 46    No Known Allergies   There were no vitals taken for this visit.    Pause for the Cause  Right patient:  Yes  Right procedure/correct coil:  Yes; rTMS; cpt 15130; H1 coil.   Earplugs in place:  Yes    Procedure  Patient was seated in procedure chair. Identity and procedure was verified. Ear plugs were placed in ears and patient-specific cap was placed on head and tightened appropriately. Ruler locations were verified. Coil was placed at treatment location and stimulator was set to parameters described below. A test train was delivered and pt tolerated train. Given pt tolerance, 83 treatment trains were delivered. Pt tolerated procedure with minimal facial and hand movement.    Motor Threshold Determination  Distance from nasion to inion: 36.5  MT 1: 0 - 6 - 13 @ 52% on 12/21/2022  MT 2:  0 - 6 - 13 @ 53% on 1/20/2023    Stimulation Parameters:  Standard [facility]  Frequency: 18  Train Duration: 2  Total pulses delivered: 2988  Inter-train interval: 20  Tx Loc: 0 - eye - 15  Energy: 63% (120% MT)  Trains: 83  *Uses spacer*    Date MADRS QIDS  PHQ-9   12/21/22 38 22 24   12/30/22  22 20   01/06/23  19 21   01/13/23  24 24   01/20/23  23 18   01/27/23  20 17   02/03/23  20 19   02/10/23  18 18   2/17/23  20 18   2/21/23 38  17   03/03/23  22 23   3/10/23  23 20     Plan   - cont TMS    This service provided today was under the supervision of, BETH Flores MD, who was available if needed.    Zoraida Swift, TMS Technician  [facility] Neuromodulation

## 2023-03-17 ENCOUNTER — OFFICE VISIT (OUTPATIENT)
Dept: PSYCHIATRY | Facility: CLINIC | Age: 28
End: 2023-03-17
Payer: COMMERCIAL

## 2023-03-17 DIAGNOSIS — F33.2 SEVERE EPISODE OF RECURRENT MAJOR DEPRESSIVE DISORDER, WITHOUT PSYCHOTIC FEATURES (H): Primary | ICD-10-CM

## 2023-03-17 ASSESSMENT — PATIENT HEALTH QUESTIONNAIRE - PHQ9: SUM OF ALL RESPONSES TO PHQ QUESTIONS 1-9: 18

## 2023-03-17 NOTE — PROGRESS NOTES
Interventional Psychiatry Program  [address] [facility], [address]  [place] [address]  TMS Procedure Note   Nimisha Jasso MRN# [id number]  Age: 27 year old year old   YOB: 1995    Pre-Procedure:  History and Physical: Reviewed in medical record  Consent Signed by: Nimisha Jasso for this course of treatment. On: 12/21/2022    Nimisha Jasso comes into clinic today at the request of Keven Downey MD, ordering provider for TMS.    Clinical Narrative:  Patient tolerated treatment. Patient reports no changes.    Indications for TMS:  MDD, recurrent, severe; 4+ medication trials (from 2+ classes) ineffective; Psychotherapy ineffective     Procedure Diagnosis:  Severe episode of recurrent major depressive disorder, without psychotic features (H)  (primary encounter diagnosis)    Treatment Hx:  Treatment this series: 47  Lifetime treatment number: 47    No Known Allergies   There were no vitals taken for this visit.    Pause for the Cause  Right patient:  Yes  Right procedure/correct coil:  Yes; rTMS; cpt 81992; H1 coil.   Earplugs in place:  Yes    Procedure  Patient was seated in procedure chair. Identity and procedure was verified. Ear plugs were placed in ears and patient-specific cap was placed on head and tightened appropriately. Ruler locations were verified. Coil was placed at treatment location and stimulator was set to parameters described below. A test train was delivered and pt tolerated train. Given pt tolerance, 83 treatment trains were delivered. Pt tolerated procedure with minimal facial and hand movement.    Motor Threshold Determination  Distance from nasion to inion: 36.5  MT 1: 0 - 6 - 13 @ 52% on 12/21/2022  MT 2:  0 - 6 - 13 @ 53% on 1/20/2023    Stimulation Parameters:  Standard [facility]  Frequency: 18  Train Duration: 2  Total pulses delivered: 2988  Inter-train interval: 20  Tx Loc: 0 - eye - 15  Energy: 63% (120% MT)  Trains: 83  *Uses spacer*    Date MADRS QIDS  PHQ-9   12/21/22 38 22 24   12/30/22  22 20   01/06/23  19 21   01/13/23  24 24   01/20/23  23 18   01/27/23  20 17   02/03/23  20 19   02/10/23  18 18   2/17/23  20 18   2/21/23 38  17   03/03/23  22 23   3/10/23  23 20   03/17/23  22 18     Plan   - cont TMS    This service provided today was under the supervision of, Magnus Henriquez MD, who was available if needed.    Zoraida Swift, TMS Technician  [facility] Neuromodulation

## 2023-03-20 ENCOUNTER — OFFICE VISIT (OUTPATIENT)
Dept: PSYCHIATRY | Facility: CLINIC | Age: 28
End: 2023-03-20
Payer: COMMERCIAL

## 2023-03-20 DIAGNOSIS — F33.2 SEVERE EPISODE OF RECURRENT MAJOR DEPRESSIVE DISORDER, WITHOUT PSYCHOTIC FEATURES (H): Primary | ICD-10-CM

## 2023-03-20 ASSESSMENT — PATIENT HEALTH QUESTIONNAIRE - PHQ9: SUM OF ALL RESPONSES TO PHQ QUESTIONS 1-9: 20

## 2023-03-20 NOTE — PROGRESS NOTES
Interventional Psychiatry Program  [address] [facility] [address], [address]  [place] [address]  TMS Procedure Note   Nimisha Jasso MRN# [id number]  Age: 27 year old year old   YOB: 1995    Pre-Procedure:  History and Physical: Reviewed in medical record  Consent Signed by: Nimisha Jasso for this course of treatment. On: 12/21/2022    Nimisha Jasso comes into clinic today at the request of Keven Downey MD, ordering provider for TMS.    Clinical Narrative:  Patient tolerated treatment. Patient reports no changes.    Indications for TMS:  MDD, recurrent, severe; 4+ medication trials (from 2+ classes) ineffective; Psychotherapy ineffective     Procedure Diagnosis:  Severe episode of recurrent major depressive disorder, without psychotic features (H)  (primary encounter diagnosis)    Treatment Hx:  Treatment this series: 48  Lifetime treatment number: 48    No Known Allergies   There were no vitals taken for this visit.    Pause for the Cause  Right patient:  Yes  Right procedure/correct coil:  Yes; rTMS; cpt 08211; H1 coil.   Earplugs in place:  Yes    Procedure  Patient was seated in procedure chair. Identity and procedure was verified. Ear plugs were placed in ears and patient-specific cap was placed on head and tightened appropriately. Ruler locations were verified. Coil was placed at treatment location and stimulator was set to parameters described below. A test train was delivered and pt tolerated train. Given pt tolerance, 83 treatment trains were delivered. Pt tolerated procedure with minimal facial and hand movement.    Motor Threshold Determination  Distance from nasion to inion: 36.5  MT 1: 0 - 6 - 13 @ 52% on 12/21/2022  MT 2:  0 - 6 - 13 @ 53% on 1/20/2023    Stimulation Parameters:  Standard [facility]  Frequency: 18  Train Duration: 2  Total pulses delivered: 2988  Inter-train interval: 20  Tx Loc: 0 - eye - 15  Energy: 63% (120% MT)  Trains: 83  *Uses spacer*    Date MADRS QIDS  PHQ-9   12/21/22 38 22 24   12/30/22  22 20   01/06/23  19 21   01/13/23  24 24   01/20/23  23 18   01/27/23  20 17   02/03/23  20 19   02/10/23  18 18   2/17/23  20 18   2/21/23 38  17   03/03/23  22 23   3/10/23  23 20   03/17/23  22 18     Plan   - cont TMS    This service provided today was under the supervision of, Magnus Henriquez MD, who was available if needed.    Zoraida Swift, TMS Technician  [facility] Neuromodulation

## 2023-03-21 ENCOUNTER — OFFICE VISIT (OUTPATIENT)
Dept: PSYCHIATRY | Facility: CLINIC | Age: 28
End: 2023-03-21
Payer: COMMERCIAL

## 2023-03-21 DIAGNOSIS — F33.2 SEVERE EPISODE OF RECURRENT MAJOR DEPRESSIVE DISORDER, WITHOUT PSYCHOTIC FEATURES (H): Primary | ICD-10-CM

## 2023-03-21 ASSESSMENT — PATIENT HEALTH QUESTIONNAIRE - PHQ9: SUM OF ALL RESPONSES TO PHQ QUESTIONS 1-9: 18

## 2023-03-21 NOTE — PROGRESS NOTES
Interventional Psychiatry Program  [address] [facility] [facility] [address], [address]  [place] [address]  TMS Procedure Note   Nimisha Jasso MRN# [id number]  Age: 27 year old year old   YOB: 1995    Pre-Procedure:  History and Physical: Reviewed in medical record  Consent Signed by: Nimisha Jasso for this course of treatment. On: 12/21/2022    Nimisha Jasso comes into clinic today at the request of Keven Downey MD, ordering provider for TMS.    Clinical Narrative:  Patient tolerated treatment. Patient reports no changes.    Indications for TMS:  MDD, recurrent, severe; 4+ medication trials (from 2+ classes) ineffective; Psychotherapy ineffective     Procedure Diagnosis:  Severe episode of recurrent major depressive disorder, without psychotic features (H)  (primary encounter diagnosis)    Treatment Hx:  Treatment this series: 49  Lifetime treatment number: 49    No Known Allergies   There were no vitals taken for this visit.    Pause for the Cause  Right patient:  Yes  Right procedure/correct coil:  Yes; rTMS; cpt 51313; H1 coil.   Earplugs in place:  Yes    Procedure  Patient was seated in procedure chair. Identity and procedure was verified. Ear plugs were placed in ears and patient-specific cap was placed on head and tightened appropriately. Ruler locations were verified. Coil was placed at treatment location and stimulator was set to parameters described below. A test train was delivered and pt tolerated train. Given pt tolerance, 83 treatment trains were delivered. Pt tolerated procedure with minimal facial and hand movement.    Motor Threshold Determination  Distance from nasion to inion: 36.5  MT 1: 0 - 6 - 13 @ 52% on 12/21/2022  MT 2:  0 - 6 - 13 @ 53% on 1/20/2023    Stimulation Parameters:  Standard [facility]  Frequency: 18  Train Duration: 2  Total pulses delivered: 2988  Inter-train interval: 20  Tx Loc: 0 - eye - 15  Energy: 63% (120% MT)  Trains: 83  *Uses spacer*    Date MADRS QIDS  PHQ-9   12/21/22 38 22 24   12/30/22  22 20   01/06/23  19 21   01/13/23  24 24   01/20/23  23 18   01/27/23  20 17   02/03/23  20 19   02/10/23  18 18   2/17/23  20 18   2/21/23 38  17   03/03/23  22 23   3/10/23  23 20   03/17/23  22 18     Plan   - cont TMS    This service provided today was under the supervision of, BETH Flores MD, who was available if needed.    Zoraida Swift, TMS Technician  [facility] Neuromodulation

## 2023-03-22 ENCOUNTER — OFFICE VISIT (OUTPATIENT)
Dept: PSYCHIATRY | Facility: CLINIC | Age: 28
End: 2023-03-22
Payer: COMMERCIAL

## 2023-03-22 DIAGNOSIS — F33.2 SEVERE EPISODE OF RECURRENT MAJOR DEPRESSIVE DISORDER, WITHOUT PSYCHOTIC FEATURES (H): Primary | ICD-10-CM

## 2023-03-22 ASSESSMENT — PATIENT HEALTH QUESTIONNAIRE - PHQ9: SUM OF ALL RESPONSES TO PHQ QUESTIONS 1-9: 18

## 2023-03-22 NOTE — PROGRESS NOTES
Interventional Psychiatry Program  [address] [facility], [address]  [place] [address]  TMS Procedure Note   Nimisha Jasso MRN# [id number]  Age: 27 year old year old   YOB: 1995    Pre-Procedure:  History and Physical: Reviewed in medical record  Consent Signed by: Nimisha Jasso for this course of treatment. On: 12/21/2022    Nimisha Jasso comes into clinic today at the request of Keven Downey MD, ordering provider for TMS.    Clinical Narrative:  Patient tolerated treatment. Patient reports no changes.    Indications for TMS:  MDD, recurrent, severe; 4+ medication trials (from 2+ classes) ineffective; Psychotherapy ineffective     Procedure Diagnosis:  Severe episode of recurrent major depressive disorder, without psychotic features (H)  (primary encounter diagnosis)    Treatment Hx:  Treatment this series: 50  Lifetime treatment number: 50    No Known Allergies   There were no vitals taken for this visit.    Pause for the Cause  Right patient:  Yes  Right procedure/correct coil:  Yes; rTMS; cpt 42273; H1 coil.   Earplugs in place:  Yes    Procedure  Patient was seated in procedure chair. Identity and procedure was verified. Ear plugs were placed in ears and patient-specific cap was placed on head and tightened appropriately. Ruler locations were verified. Coil was placed at treatment location and stimulator was set to parameters described below. A test train was delivered and pt tolerated train. Given pt tolerance, 83 treatment trains were delivered. Pt tolerated procedure with minimal facial and hand movement.    Motor Threshold Determination  Distance from nasion to inion: 36.5  MT 1: 0 - 6 - 13 @ 52% on 12/21/2022  MT 2:  0 - 6 - 13 @ 53% on 1/20/2023    Stimulation Parameters:  Standard [facility]  Frequency: 18  Train Duration: 2  Total pulses delivered: 2988  Inter-train interval: 20  Tx Loc: 0 - eye - 15  Energy: 63% (120% MT)  Trains: 83  *Uses spacer*    Date MADRS QIDS  PHQ-9   12/21/22 38 22 24   12/30/22  22 20   01/06/23  19 21   01/13/23  24 24   01/20/23  23 18   01/27/23  20 17   02/03/23  20 19   02/10/23  18 18   2/17/23  20 18   2/21/23 38  17   03/03/23  22 23   3/10/23  23 20   03/17/23  22 18     Plan   - cont TMS    This service provided today was under the supervision of, Keven Downey MD, who was available if needed.    Zoraida Swift, TMS Technician  [facility] Neuromodulation

## 2023-03-23 ENCOUNTER — OFFICE VISIT (OUTPATIENT)
Dept: PSYCHIATRY | Facility: CLINIC | Age: 28
End: 2023-03-23
Payer: COMMERCIAL

## 2023-03-23 DIAGNOSIS — F33.2 SEVERE EPISODE OF RECURRENT MAJOR DEPRESSIVE DISORDER, WITHOUT PSYCHOTIC FEATURES (H): Primary | ICD-10-CM

## 2023-03-23 ASSESSMENT — PATIENT HEALTH QUESTIONNAIRE - PHQ9: SUM OF ALL RESPONSES TO PHQ QUESTIONS 1-9: 16

## 2023-03-23 NOTE — PROGRESS NOTES
Interventional Psychiatry Program  [address] [facility] [address], [address]  [place] [address]  TMS Procedure Note   Nimisha Jasso MRN# [id number]  Age: 27 year old year old   YOB: 1995    Pre-Procedure:  History and Physical: Reviewed in medical record  Consent Signed by: Nimisha Jasso for this course of treatment. On: 12/21/2022    Nimisha Jasso comes into clinic today at the request of Keven Downey MD, ordering provider for TMS.    Clinical Narrative:  Patient tolerated treatment. Patient reports no changes.    Indications for TMS:  MDD, recurrent, severe; 4+ medication trials (from 2+ classes) ineffective; Psychotherapy ineffective     Procedure Diagnosis:  Severe episode of recurrent major depressive disorder, without psychotic features (H)  (primary encounter diagnosis)    Treatment Hx:  Treatment this series: 51  Lifetime treatment number: 51    No Known Allergies   There were no vitals taken for this visit.    Pause for the Cause  Right patient:  Yes  Right procedure/correct coil:  Yes; rTMS; cpt 04848; H1 coil.   Earplugs in place:  Yes    Procedure  Patient was seated in procedure chair. Identity and procedure was verified. Ear plugs were placed in ears and patient-specific cap was placed on head and tightened appropriately. Ruler locations were verified. Coil was placed at treatment location and stimulator was set to parameters described below. A test train was delivered and pt tolerated train. Given pt tolerance, 83 treatment trains were delivered. Pt tolerated procedure with minimal facial and hand movement.    Motor Threshold Determination  Distance from nasion to inion: 36.5  MT 1: 0 - 6 - 13 @ 52% on 12/21/2022  MT 2:  0 - 6 - 13 @ 53% on 1/20/2023    Stimulation Parameters:  Standard [facility]  Frequency: 18  Train Duration: 2  Total pulses delivered: 2988  Inter-train interval: 20  Tx Loc: 0 - eye - 15  Energy: 63% (120% MT)  Trains: 83  *Uses spacer*    Date MADRS QIDS  PHQ-9   12/21/22 38 22 24   12/30/22  22 20   01/06/23  19 21   01/13/23  24 24   01/20/23  23 18   01/27/23  20 17   02/03/23  20 19   02/10/23  18 18   2/17/23  20 18   2/21/23 38  17   03/03/23  22 23   3/10/23  23 20   03/17/23  22 18     Plan   - cont TMS    This service provided today was under the supervision of, BETH Flores MD, who was available if needed.    Zoraida Swift, TMS Technician  [facility] Neuromodulation   Consultant

## 2023-03-28 ENCOUNTER — OFFICE VISIT (OUTPATIENT)
Dept: PSYCHIATRY | Facility: CLINIC | Age: 28
End: 2023-03-28
Payer: COMMERCIAL

## 2023-03-28 DIAGNOSIS — F33.2 SEVERE EPISODE OF RECURRENT MAJOR DEPRESSIVE DISORDER, WITHOUT PSYCHOTIC FEATURES (H): Primary | ICD-10-CM

## 2023-03-28 ASSESSMENT — PATIENT HEALTH QUESTIONNAIRE - PHQ9: SUM OF ALL RESPONSES TO PHQ QUESTIONS 1-9: 17

## 2023-03-28 NOTE — PROGRESS NOTES
Interventional Psychiatry Program  [address] [facility], [address]  [place] [address]  TMS Procedure Note   Nimisha Jasso MRN# [id number]  Age: 27 year old year old   YOB: 1995    Pre-Procedure:  History and Physical: Reviewed in medical record  Consent Signed by: Nimisha Jasso for this course of treatment. On: 12/21/2022    Nimisha Jasso comes into clinic today at the request of Keven Downey MD, ordering provider for TMS.    Clinical Narrative:  Patient tolerated treatment. Patient reports no changes.    Indications for TMS:  MDD, recurrent, severe; 4+ medication trials (from 2+ classes) ineffective; Psychotherapy ineffective     Procedure Diagnosis:  Severe episode of recurrent major depressive disorder, without psychotic features (H)  (primary encounter diagnosis)    Treatment Hx:  Treatment this series: 52  Lifetime treatment number: 52    No Known Allergies   There were no vitals taken for this visit.    Pause for the Cause  Right patient:  Yes  Right procedure/correct coil:  Yes; rTMS; cpt 60731; H1 coil.   Earplugs in place:  Yes    Procedure  Patient was seated in procedure chair. Identity and procedure was verified. Ear plugs were placed in ears and patient-specific cap was placed on head and tightened appropriately. Ruler locations were verified. Coil was placed at treatment location and stimulator was set to parameters described below. A test train was delivered and pt tolerated train. Given pt tolerance, 83 treatment trains were delivered. Pt tolerated procedure with minimal facial and hand movement.    Motor Threshold Determination  Distance from nasion to inion: 36.5  MT 1: 0 - 6 - 13 @ 52% on 12/21/2022  MT 2:  0 - 6 - 13 @ 53% on 1/20/2023    Stimulation Parameters:  Standard [facility]  Frequency: 18  Train Duration: 2  Total pulses delivered: 2988  Inter-train interval: 20  Tx Loc: 0 - eye - 15  Energy: 63% (120% MT)  Trains: 83  *Uses spacer*    Date MADRS QIDS  PHQ-9   12/21/22 38 22 24   12/30/22  22 20   01/06/23  19 21   01/13/23  24 24   01/20/23  23 18   01/27/23  20 17   02/03/23  20 19   02/10/23  18 18   2/17/23  20 18   2/21/23 38  17   03/03/23  22 23   3/10/23  23 20   03/17/23  22 18     Plan   - cont TMS    This service provided today was under the supervision of, PRAMOD Flores MD, who was available if needed.    Jessica Bermudez RN  [facility]  Mental [facility] Neuromodulation

## 2023-03-29 ENCOUNTER — OFFICE VISIT (OUTPATIENT)
Dept: PSYCHIATRY | Facility: CLINIC | Age: 28
End: 2023-03-29
Payer: COMMERCIAL

## 2023-03-29 DIAGNOSIS — F33.2 SEVERE EPISODE OF RECURRENT MAJOR DEPRESSIVE DISORDER, WITHOUT PSYCHOTIC FEATURES (H): Primary | ICD-10-CM

## 2023-03-29 ASSESSMENT — PATIENT HEALTH QUESTIONNAIRE - PHQ9: SUM OF ALL RESPONSES TO PHQ QUESTIONS 1-9: 17

## 2023-03-29 NOTE — PROGRESS NOTES
Interventional Psychiatry Program  [address] [facility], [address]  [place] [address]  TMS Procedure Note   Nimisha Jasso MRN# [id number]  Age: 27 year old year old   YOB: 1995    Pre-Procedure:  History and Physical: Reviewed in medical record  Consent Signed by: Nimisha Jasso for this course of treatment. On: 12/21/2022    Nimisha Jasso comes into clinic today at the request of Keven Downey MD, ordering provider for TMS.    Clinical Narrative:  Patient tolerated treatment. Patient reports no changes.    Indications for TMS:  MDD, recurrent, severe; 4+ medication trials (from 2+ classes) ineffective; Psychotherapy ineffective     Procedure Diagnosis:  Severe episode of recurrent major depressive disorder, without psychotic features (H)  (primary encounter diagnosis)    Treatment Hx:  Treatment this series: 53  Lifetime treatment number: 53    No Known Allergies   There were no vitals taken for this visit.    Pause for the Cause  Right patient:  Yes  Right procedure/correct coil:  Yes; rTMS; cpt 63447; H1 coil.   Earplugs in place:  Yes    Procedure  Patient was seated in procedure chair. Identity and procedure was verified. Ear plugs were placed in ears and patient-specific cap was placed on head and tightened appropriately. Ruler locations were verified. Coil was placed at treatment location and stimulator was set to parameters described below. A test train was delivered and pt tolerated train. Given pt tolerance, 83 treatment trains were delivered. Pt tolerated procedure with minimal facial and hand movement.    Motor Threshold Determination  Distance from nasion to inion: 36.5  MT 1: 0 - 6 - 13 @ 52% on 12/21/2022  MT 2:  0 - 6 - 13 @ 53% on 1/20/2023    Stimulation Parameters:  Standard [facility]  Frequency: 18  Train Duration: 2  Total pulses delivered: 2988  Inter-train interval: 20  Tx Loc: 0 - eye - 15  Energy: 63% (120% MT)  Trains: 83  *Uses spacer*    Date MADRS QIDS  PHQ-9   12/21/22 38 22 24   12/30/22  22 20   01/06/23  19 21   01/13/23  24 24   01/20/23  23 18   01/27/23  20 17   02/03/23  20 19   02/10/23  18 18   2/17/23  20 18   2/21/23 38  17   03/03/23  22 23   3/10/23  23 20   03/17/23  22 18     Plan   - cont TMS    This service provided today was under the supervision of, Keven Downey MD, who was available if needed.    Shelly Mills RN   [facility]  Mental [facility] Neuromodulation

## 2023-03-30 ENCOUNTER — OFFICE VISIT (OUTPATIENT)
Dept: PSYCHIATRY | Facility: CLINIC | Age: 28
End: 2023-03-30
Payer: COMMERCIAL

## 2023-03-30 ENCOUNTER — VIRTUAL VISIT (OUTPATIENT)
Dept: PSYCHIATRY | Facility: CLINIC | Age: 28
End: 2023-03-30
Payer: COMMERCIAL

## 2023-03-30 VITALS — HEIGHT: 65 IN | BODY MASS INDEX: 27.76 KG/M2

## 2023-03-30 DIAGNOSIS — F33.2 SEVERE EPISODE OF RECURRENT MAJOR DEPRESSIVE DISORDER, WITHOUT PSYCHOTIC FEATURES (H): Primary | ICD-10-CM

## 2023-03-30 DIAGNOSIS — F41.1 GENERALIZED ANXIETY DISORDER: ICD-10-CM

## 2023-03-30 RX ORDER — DEXTROAMPHETAMINE/AMPHETAMINE 10 MG
10 CAPSULE, EXT RELEASE 24 HR ORAL DAILY
COMMUNITY
Start: 2023-03-17

## 2023-03-30 RX ORDER — VORTIOXETINE 5 MG/1
5 TABLET, FILM COATED ORAL DAILY
COMMUNITY
Start: 2023-03-19

## 2023-03-30 ASSESSMENT — PAIN SCALES - GENERAL: PAINLEVEL: SEVERE PAIN (7)

## 2023-03-30 ASSESSMENT — PATIENT HEALTH QUESTIONNAIRE - PHQ9
SUM OF ALL RESPONSES TO PHQ QUESTIONS 1-9: 19
10. IF YOU CHECKED OFF ANY PROBLEMS, HOW DIFFICULT HAVE THESE PROBLEMS MADE IT FOR YOU TO DO YOUR WORK, TAKE CARE OF THINGS AT HOME, OR GET ALONG WITH OTHER PEOPLE: EXTREMELY DIFFICULT
SUM OF ALL RESPONSES TO PHQ QUESTIONS 1-9: 19
SUM OF ALL RESPONSES TO PHQ QUESTIONS 1-9: 19

## 2023-03-30 NOTE — PROGRESS NOTES
Interventional Psychiatry Program  [address] [address], [address]  [place] [address]  TMS Procedure Note   Nimisha Jasso MRN# [id number]  Age: 27 year old year old   YOB: 1995    Pre-Procedure:  History and Physical: Reviewed in medical record  Consent Signed by: Nimisha Jasso for this course of treatment. On: 12/21/2022    Nimisha Jasso comes into clinic today at the request of Keven Downey MD, ordering provider for TMS.    Clinical Narrative:  Patient tolerated treatment. Patient reports no changes.    Indications for TMS:  MDD, recurrent, severe; 4+ medication trials (from 2+ classes) ineffective; Psychotherapy ineffective     Procedure Diagnosis:  Severe episode of recurrent major depressive disorder, without psychotic features (H)  (primary encounter diagnosis)    Treatment Hx:  Treatment this series: 54  Lifetime treatment number: 54    No Known Allergies   There were no vitals taken for this visit.    Pause for the Cause  Right patient:  Yes  Right procedure/correct coil:  Yes; rTMS; cpt 69600; H1 coil.   Earplugs in place:  Yes    Procedure  Patient was seated in procedure chair. Identity and procedure was verified. Ear plugs were placed in ears and patient-specific cap was placed on head and tightened appropriately. Ruler locations were verified. Coil was placed at treatment location and stimulator was set to parameters described below. A test train was delivered and pt tolerated train. Given pt tolerance, 83 treatment trains were delivered. Pt tolerated procedure with minimal facial and hand movement.    Motor Threshold Determination  Distance from nasion to inion: 36.5  MT 1: 0 - 6 - 13 @ 52% on 12/21/2022  MT 2:  0 - 6 - 13 @ 53% on 1/20/2023    Stimulation Parameters:  Standard [facility]  Frequency: 18  Train Duration: 2  Total pulses delivered: 2988  Inter-train interval: 20  Tx Loc: 0 - eye - 15  Energy: 63% (120% MT)  Trains: 83  *Uses spacer*    Date MADRS QIDS  PHQ-9   12/21/22 38 22 24   12/30/22  22 20   01/06/23  19 21   01/13/23  24 24   01/20/23  23 18   01/27/23  20 17   02/03/23  20 19   02/10/23  18 18   2/17/23  20 18   2/21/23 38  17   03/03/23  22 23   3/10/23  23 20   03/17/23  22 18   3/30/23  20 19     Plan   - follow up as scheduled    This service provided today was under the supervision of, ELGIN Flores MD, who was available if needed.    Romina Reid, Psychometrist  [facility]  Mental [facility] Neuromodulation

## 2023-03-30 NOTE — NURSING NOTE
PHQ-9 score is 19 and #9 is 1, several days.     Is the patient currently in the state of [place]? YES    Visit mode:VIDEO    If the visit is dropped, the patient can be reconnected by: VIDEO VISIT: Text to cell phone: [phone]    Will anyone else be joining the visit? NO    How would you like to obtain your AVS? MyChart    Are changes needed to the allergy or medication list? NO    Reason for visit: Check in, post TMS per pt.      Medication and allergies have been reviewed.     Vito Fallon, VF

## 2023-03-30 NOTE — PROGRESS NOTES
Virtual Visit Details    Type of service:  Video Visit   Video Start Time: 12:00  Video End Time:12:29    Originating Location (pt. Location): Home  Distant Location (provider location):  On-site  Platform used for Video Visit: [employer]                   Answers for HPI/ROS submitted by the patient on 3/30/2023  If you checked off any problems, how difficult have these problems made it for you to do your work, take care of things at home, or get along with other people?: Extremely difficult  PHQ9 TOTAL SCORE: 19

## 2023-03-31 ENCOUNTER — TELEPHONE (OUTPATIENT)
Dept: PSYCHIATRY | Facility: CLINIC | Age: 28
End: 2023-03-31

## 2023-03-31 NOTE — TELEPHONE ENCOUNTER
Writer called patient to discuss IV ketamine at the direction of Dr. Downey.        Writer and patient discussed insurance and IV ketamine.  Let them know that [facility] is usually pretty good about covering Ketamine-however it does require a PA.  Patient verbalized understanding of this.      We also discussed what the IV ketamine schedule might look like and what would be required of them.      Patient would like to continue to think on if this is the right time for them to do IV Ketamine, as they are looking into getting a new job.      Writer will check in with patient in 2 weeks to see what they are thinking in regards to ketamine.

## 2023-04-14 ENCOUNTER — TELEPHONE (OUTPATIENT)
Dept: PSYCHIATRY | Facility: CLINIC | Age: 28
End: 2023-04-14

## 2023-04-14 NOTE — TELEPHONE ENCOUNTER
Writer called patient to check in on IV Ketamine as requested during last phone call.     Keith reports that they are still thinking about IV Ketamine.  Requested that writer just wait to hear from Keith when they make a decision.

## 2023-08-12 ENCOUNTER — HEALTH MAINTENANCE LETTER (OUTPATIENT)
Age: 28
End: 2023-08-12

## 2023-08-23 NOTE — PROGRESS NOTES
"  Psychiatry Clinic Progress Note                                                                   \"LACEY\", Nimisha Jasso is a 27 year old adult who prefers the name Lacey and pronoun they, them.  Therapist: Valerie Rosa at [facility]  PCP- Martin Sanchez  Specialty Providers- no  Therapist- Valerie Rosa at [facility]  Psychiatric Med Management Provider- Brunilda Montes NP at [facility]  Other Mental Health Providers- no     Referred by:  Psychiatrist  Referred for evaluation of:  Depression, anxiety  History was provided by patient who was a fair historian.     Psych critical item history includes Currently in DBT, suicidal ideation, SIB  and mutiple psychotropic trials . TMS non responder    Interim History                                                                                                        4, 4     The patient is a good historian.  Since the last visit \" I didn't make a lot of progress\" they did not notice significant mood change despite full course of TMS and has significant anhedonia and low energy. struggling to get through daily self care and work tasks. 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 stillliving alone and in high anxiety sttate of dread most of the time. There has been a change in stressor because  They came out as \"lacey\" to their close family and it did not go well but they are still communicating and willing to talk it out with them. They are still in DBT trying to define their life worth living    Recent Symptoms:   Depression:  suicidal ideation, depressed mood, anhedonia, low energy, feeling worthless and mood dysregulation  Anxiety:  excessive worry, feeling fearful, social anxiety and nervous/overwhelmed     Recent Substance Use:  none reported        Social/ Family History                                  [per patient report]                                 " 1ea,1ea   See HPI    Medical / Surgical History                                                                                                                  Patient Active Problem List   Diagnosis     Generalized anxiety disorder     Insomnia     Moderate episode of recurrent major depressive disorder (H)     Major depressive disorder, recurrent episode, moderate (H)     Suicidal ideation     Severe episode of recurrent major depressive disorder, without psychotic features (H)       Past Surgical History:   Procedure Laterality Date     DENTAL SURGERY          Medical Review of Systems                                                                                                    2,10   none in addition to that documented above    Allergy                                Patient has no known allergies.    Current Medications                                                                                                       Current Outpatient Medications   Medication Sig Dispense Refill     ADDERALL XR 10 MG 24 hr capsule Take 10 mg by mouth daily       ARIPiprazole (ABILIFY) 5 MG tablet Take 5 mg by mouth daily       busPIRone HCl (BUSPAR) 30 MG tablet Take 30 mg by mouth 2 times daily       calcium-vitamin D 500 mg(1,250mg) -200 unit per tablet [CALCIUM-VITAMIN D 500 MG(1,250MG) -200 UNIT PER TABLET] Take 1 tablet by mouth 2 (two) times a day with meals.       DULoxetine (CYMBALTA) 60 MG capsule        hydrOXYzine (ATARAX) 25 MG tablet Take 25 mg by mouth every 6 hours as needed       l-lysine HCl 500 MG TABS tablet Take 500 mg by mouth daily       QUEtiapine (SEROQUEL) 25 MG tablet Take 25 mg by mouth as needed       traZODone (DESYREL) 50 MG tablet Take 1 tablet (50 mg) by mouth At Bedtime 30 tablet 0     TRINTELLIX 5 MG tablet Take 5 mg by mouth daily       busPIRone (BUSPAR) 15 MG tablet Take 15 mg by mouth 2 times daily Morning and 2000. (Patient not taking: Reported on 10/17/2022)       DULoxetine  "(CYMBALTA) 20 MG capsule  (Patient not taking: Reported on 10/17/2022)       DULoxetine (CYMBALTA) 30 MG capsule  (Patient not taking: Reported on 10/17/2022)         Vitals                                                                                                                       3, 3   Ht 1.651 m (5' 5\")   BMI 27.76 kg/m       Mental Status Exam                                                                                    9, 14 cog gs     Alertness: alert   Appearance: adequately groomed.   Behavior/Demeanor: cooperative, with fair  eye contact   Speech: regular rate and rhythm  Language: no obvious problem  Psychomotor: and tics or mannerisms  Mood: depressed and anxious  Affect: blunted; was congruent to mood; was congruent to content  Thought Process/Associations: linear  Thought Content:  Reports contamination obsessionssuicidal ideation without plan; without intent [details in Interim History];  Denies violent ideation  Perception:  Reports none;  Denies auditory hallucinations and visual hallucinations  Insight: fair  Judgment: good  Cognition: (6) does  appear grossly intact; formal cognitive testing was not done  Gait and Station: unremarkable       Labs and Data                                                                                                                   PHQ9 Today:  19      3/29/2023     9:03 AM 3/30/2023     8:53 AM 3/30/2023    11:39 AM   PHQ   PHQ-9 Total Score 17 19 19   Q9: Thoughts of better off dead/self-harm past 2 weeks Several days Several days Several days   F/U: Thoughts of suicide or self-harm   No   F/U: Safety concerns   No         Diagnosis and Assessment                                                                             m2, h3     Today the following issues were addressed:    Presents after TMS non response to discuss treatment options. They are focusing on completing DBT and new job responsibilities and does not commit to a new treatment " plan.    Plan                                                                                                                    m2, h3      1) Major depressive disorder, recurrent, severe  -- Medications: Continue current outpatient psychotropic medications. Consider further titration of serotonergic medications (anxiety disorders and OCD often need higher doses for full resolutions) and adjunctive Lithium for severe depression  -- Psychotherapy: Continue regular DBT  -- COPnsider ECt if worsens and rapid symptom resolution is needed   alternatively consider ketamine tretament    RTC: PRN    CRISIS NUMBERS:   Provided routinely in AVS.    Treatment Risk Statement:  The patient understands the risks, benefits, adverse effects and alternatives. Agrees to treatment with the capacity to do so. No medical contraindications to treatment. Agrees to call clinic for any problems. The patient understands to call 911 or go to the nearest ED if life threatening or urgent symptoms occur.          PROVIDER:  Keven Downey MD  Answers for HPI/ROS submitted by the patient on 3/30/2023  If you checked off any problems, how difficult have these problems made it for you to do your work, take care of things at home, or get along with other people?: Extremely difficult  PHQ9 TOTAL SCORE: 19

## 2023-08-28 ENCOUNTER — TELEPHONE (OUTPATIENT)
Dept: FAMILY MEDICINE | Facility: CLINIC | Age: 28
End: 2023-08-28
Payer: COMMERCIAL

## 2023-08-28 NOTE — TELEPHONE ENCOUNTER
Patient Quality Outreach    Patient is due for the following:   Depression  -  PHQ-9 needed    Next Steps:   Schedule a Adult Preventative    Type of outreach:    Phone, left message for patient/parent to call back.    Next Steps:  Reach out within 90 days via Phone.    Max number of attempts reached: Yes. Will try again in 90 days if patient still on fail list.    Questions for provider review:    None           Yumi Garrett MA

## 2024-07-10 ENCOUNTER — TRANSFERRED RECORDS (OUTPATIENT)
Dept: MULTI SPECIALTY CLINIC | Facility: CLINIC | Age: 29
End: 2024-07-10

## 2024-07-10 LAB — PAP SMEAR - HIM PATIENT REPORTED: NORMAL

## 2024-10-05 ENCOUNTER — HEALTH MAINTENANCE LETTER (OUTPATIENT)
Age: 29
End: 2024-10-05

## 2025-01-24 ENCOUNTER — TRANSFERRED RECORDS (OUTPATIENT)
Dept: HEALTH INFORMATION MANAGEMENT | Facility: CLINIC | Age: 30
End: 2025-01-24

## 2025-04-25 ENCOUNTER — TRANSFERRED RECORDS (OUTPATIENT)
Dept: HEALTH INFORMATION MANAGEMENT | Facility: CLINIC | Age: 30
End: 2025-04-25

## 2025-04-28 ENCOUNTER — OFFICE VISIT (OUTPATIENT)
Dept: FAMILY MEDICINE | Facility: CLINIC | Age: 30
End: 2025-04-28
Payer: COMMERCIAL

## 2025-04-28 VITALS
SYSTOLIC BLOOD PRESSURE: 121 MMHG | RESPIRATION RATE: 17 BRPM | HEART RATE: 112 BPM | DIASTOLIC BLOOD PRESSURE: 78 MMHG | HEIGHT: 65 IN | OXYGEN SATURATION: 97 % | TEMPERATURE: 97.6 F | BODY MASS INDEX: 27.76 KG/M2

## 2025-04-28 DIAGNOSIS — B00.1 COLD SORE: ICD-10-CM

## 2025-04-28 DIAGNOSIS — Z76.89 ENCOUNTER TO ESTABLISH CARE: Primary | ICD-10-CM

## 2025-04-28 DIAGNOSIS — I51.7 LEFT VENTRICULAR HYPERTROPHY BY ELECTROCARDIOGRAM: ICD-10-CM

## 2025-04-28 DIAGNOSIS — R06.09 DYSPNEA ON EXERTION: ICD-10-CM

## 2025-04-28 DIAGNOSIS — R00.0 TACHYCARDIA: ICD-10-CM

## 2025-04-28 DIAGNOSIS — R94.31 ABNORMAL ELECTROCARDIOGRAM: ICD-10-CM

## 2025-04-28 PROBLEM — F33.1 MODERATE EPISODE OF RECURRENT MAJOR DEPRESSIVE DISORDER (H): Status: RESOLVED | Noted: 2018-11-23 | Resolved: 2025-04-28

## 2025-04-28 PROBLEM — F33.2 SEVERE EPISODE OF RECURRENT MAJOR DEPRESSIVE DISORDER, WITHOUT PSYCHOTIC FEATURES (H): Status: RESOLVED | Noted: 2021-10-26 | Resolved: 2025-04-28

## 2025-04-28 RX ORDER — LAMOTRIGINE 200 MG/1
200 TABLET ORAL DAILY
COMMUNITY
Start: 2025-03-31

## 2025-04-28 RX ORDER — TESTOSTERONE CYPIONATE 200 MG/ML
50 INJECTION, SOLUTION INTRAMUSCULAR WEEKLY
COMMUNITY
Start: 2025-03-05

## 2025-04-28 RX ORDER — PROPRANOLOL HYDROCHLORIDE 10 MG/1
10 TABLET ORAL 2 TIMES DAILY PRN
COMMUNITY
Start: 2025-02-26

## 2025-04-28 RX ORDER — VALACYCLOVIR HYDROCHLORIDE 1 G/1
2000 TABLET, FILM COATED ORAL 2 TIMES DAILY
Qty: 4 TABLET | Refills: 0 | Status: SHIPPED | OUTPATIENT
Start: 2025-04-28 | End: 2025-04-29

## 2025-04-28 RX ORDER — ETONOGESTREL 68 MG/1
1 IMPLANT SUBCUTANEOUS ONCE
COMMUNITY

## 2025-04-28 ASSESSMENT — PATIENT HEALTH QUESTIONNAIRE - PHQ9
SUM OF ALL RESPONSES TO PHQ QUESTIONS 1-9: 11
SUM OF ALL RESPONSES TO PHQ QUESTIONS 1-9: 11
10. IF YOU CHECKED OFF ANY PROBLEMS, HOW DIFFICULT HAVE THESE PROBLEMS MADE IT FOR YOU TO DO YOUR WORK, TAKE CARE OF THINGS AT HOME, OR GET ALONG WITH OTHER PEOPLE: SOMEWHAT DIFFICULT

## 2025-04-28 ASSESSMENT — PAIN SCALES - GENERAL: PAINLEVEL_OUTOF10: SEVERE PAIN (7)

## 2025-04-28 NOTE — PROGRESS NOTES
Assessment & Plan     Encounter to establish care  Establishing at \[facility]\"" for primary care and possibly to transfer gender affirming care from [facility]  - Continue seeing therapist at [facility] [place]   - Continue seeing psychiatry at [facility]  - Follow-up with us if desired for HRT    Tachycardia  Dyspnea on exertion  Several months of tachycardia, dyspnea on exertion, positional chest pain.  Low likely ACS, most likely MSK.  Due to tachycardia in office today, will obtain Zio patch.  No wheezes or respiratory distress in office today, will obtain spirometry to rule out asthma . EKG w/ LVH by voltage criteria see below.   - ZIO PATCH MAIL OUT; Future  - EKG 12-lead complete w/read - Clinics  - General PFT Lab (Please always keep checked); Future  - Pulmonary Function Test; Future    Left ventricular hypertrophy by electrocardiogram  Abnormal electrocardiogram  May be normal variant but with symptoms, will obtain echocardiogram. No audible murmur on exam. No known fhx HOCM.   - Echocardiogram Complete; Future    Cold sore  Papule on underside of tongue is painful, present for a week.  Aphthous ulcer versus oral herpes, more likely aphthous ulcer but will treat empirically today.  No history of LFT abnormality  - valACYclovir (VALTREX) 1000 mg tablet; Take 2 tablets (2,000 mg) by mouth 2 times daily for 1 day.            Kaia Parker is a 29 year old, presenting for the following health issues:  Establish Care, Chest Pain, and Recheck Medication      4/28/2025    11:06 AM   Additional Questions   Roomed by Obinna   Accompanied by Self         4/28/2025   Declines Weight   Did patient decline having their weight taken? Yes         4/28/2025    Information    services provided? No     Via the Health Maintenance questionnaire, the patient has reported the following services have been completed -Cervical Cancer Screening: [facility] 2024-07-10, this information has been sent to  "the abstraction team.  HPI      Dyspnea, chest pain  - going on a few months  - positional   - not sure if because of anxiety, or the cause of it  - shortness of breath occurs with walking  - feels heart skipping    PMHX  - Psychiatry; [facility], MDD versus bipolar. Possible/likely ADHD. Next appointment May 1st  - Sees therapist 2x week at [facility]   - Gender affirming care: [facility], 2024[facility]. [facility] Nov 2024  - currently: 0.4 mL testosterone weekly    Social history  - enjoys dnd, art                    Objective    /78 (BP Location: Left arm, Patient Position: Sitting, Cuff Size: Adult Regular)   Pulse 112   Temp 97.6  F (36.4  C) (Temporal)   Resp 17   Ht 1.651 m (5' 5\")   LMP 01/10/2024 (Approximate)   SpO2 97%   BMI 27.76 kg/m    Body mass index is 27.76 kg/m .  Physical Exam   General: Alert and oriented, in no acute distress.  ENT: On the underside of the tip of the tongue, there is a single 2 mm papule with surrounding erythema  CV: Tachycardic.  Regular rhythm  Respiratory: No respiratory distress, no accessory muscle use.  No wheeze rhonchi or rales        EKG - Reviewed and interpreted by me sinus tachycardia, LVH by voltage criteria, normal intervals, no acute ST/T changes c/w ischemia, there are no prior tracings available          Signed Electronically by: Columba Cassidy MD    Answers submitted by the patient for this visit:  Patient Health Questionnaire (Submitted on 4/28/2025)  If you checked off any problems, how difficult have these problems made it for you to do your work, take care of things at home, or get along with other people?: Somewhat difficult  PHQ9 TOTAL SCORE: 11    "

## 2025-04-30 DIAGNOSIS — R06.09 DYSPNEA ON EXERTION: ICD-10-CM

## 2025-05-01 LAB
DLCOUNC-%PRED-PRE: 136 %
DLCOUNC-PRE: 30.86 ML/MIN/MMHG
DLCOUNC-PRED: 22.67 ML/MIN/MMHG
ERV-%PRED-PRE: 112 %
ERV-PRE: 1.45 L
ERV-PRED: 1.29 L
EXPTIME-PRE: 4.61 SEC
FEF2575-%PRED-PRE: 64 %
FEF2575-PRE: 2.27 L/SEC
FEF2575-PRED: 3.52 L/SEC
FEFMAX-%PRED-PRE: 75 %
FEFMAX-PRE: 5.41 L/SEC
FEFMAX-PRED: 7.14 L/SEC
FEV1-%PRED-PRE: 84 %
FEV1-PRE: 2.61 L
FEV1FEV6-PRE: 78 %
FEV1FEV6-PRED: 86 %
FEV1FVC-PRE: 79 %
FEV1FVC-PRED: 86 %
FEV1SVC-PRE: 76 %
FEV1SVC-PRED: 82 %
FIFMAX-PRE: 5.07 L/SEC
FRCPLETH-%PRED-PRE: 82 %
FRCPLETH-PRE: 2.34 L
FRCPLETH-PRED: 2.85 L
FVC-%PRED-PRE: 91 %
FVC-PRE: 3.3 L
FVC-PRED: 3.62 L
IC-%PRED-PRE: 76 %
IC-PRE: 1.97 L
IC-PRED: 2.58 L
RVPLETH-%PRED-PRE: 61 %
RVPLETH-PRE: 0.89 L
RVPLETH-PRED: 1.45 L
TLCPLETH-%PRED-PRE: 84 %
TLCPLETH-PRE: 4.31 L
TLCPLETH-PRED: 5.11 L
VA-%PRED-PRE: 89 %
VA-PRE: 4.44 L
VC-%PRED-PRE: 90 %
VC-PRE: 3.42 L
VC-PRED: 3.79 L

## 2025-05-25 ENCOUNTER — PREP FOR PROCEDURE (OUTPATIENT)
Dept: SURGERY | Facility: CLINIC | Age: 30
End: 2025-05-25
Payer: COMMERCIAL

## 2025-05-25 DIAGNOSIS — F64.9 GENDER DYSPHORIA: Primary | ICD-10-CM

## 2025-07-10 ENCOUNTER — APPOINTMENT (OUTPATIENT)
Dept: URBAN - METROPOLITAN AREA CLINIC 255 | Age: 30
Setting detail: DERMATOLOGY
End: 2025-07-10

## 2025-07-10 VITALS — WEIGHT: 130 LBS | HEIGHT: 65 IN

## 2025-07-10 DIAGNOSIS — Z79.899 OTHER LONG TERM (CURRENT) DRUG THERAPY: ICD-10-CM

## 2025-07-10 DIAGNOSIS — L70.0 ACNE VULGARIS: ICD-10-CM

## 2025-07-10 PROCEDURE — OTHER COUNSELING: OTHER

## 2025-07-10 PROCEDURE — OTHER MIPS QUALITY: OTHER

## 2025-07-10 PROCEDURE — OTHER PHOTO-DOCUMENTATION: OTHER

## 2025-07-10 PROCEDURE — OTHER URINE PREGNANCY TEST: OTHER

## 2025-07-10 PROCEDURE — OTHER ADDITIONAL NOTES: OTHER

## 2025-07-10 PROCEDURE — OTHER HIGH RISK MEDICATION MONITORING: OTHER

## 2025-07-10 PROCEDURE — 99204 OFFICE O/P NEW MOD 45 MIN: CPT

## 2025-07-10 PROCEDURE — 81025 URINE PREGNANCY TEST: CPT

## 2025-07-10 PROCEDURE — OTHER ISOTRETINOIN INITIATION: OTHER

## 2025-07-10 ASSESSMENT — LOCATION SIMPLE DESCRIPTION DERM
LOCATION SIMPLE: RIGHT CHEEK
LOCATION SIMPLE: LEFT CHEEK

## 2025-07-10 ASSESSMENT — LOCATION DETAILED DESCRIPTION DERM
LOCATION DETAILED: RIGHT INFERIOR CENTRAL MALAR CHEEK
LOCATION DETAILED: LEFT INFERIOR CENTRAL MALAR CHEEK

## 2025-07-10 ASSESSMENT — LOCATION ZONE DERM: LOCATION ZONE: FACE

## 2025-08-12 ENCOUNTER — APPOINTMENT (OUTPATIENT)
Dept: URBAN - METROPOLITAN AREA CLINIC 255 | Age: 30
Setting detail: DERMATOLOGY
End: 2025-08-13

## 2025-08-12 VITALS — WEIGHT: 135 LBS | HEIGHT: 65 IN

## 2025-08-12 DIAGNOSIS — Z79.899 OTHER LONG TERM (CURRENT) DRUG THERAPY: ICD-10-CM

## 2025-08-12 DIAGNOSIS — L70.0 ACNE VULGARIS: ICD-10-CM

## 2025-08-12 PROCEDURE — 36415 COLL VENOUS BLD VENIPUNCTURE: CPT

## 2025-08-12 PROCEDURE — OTHER VENIPUNCTURE: OTHER

## 2025-08-12 PROCEDURE — OTHER MIPS QUALITY: OTHER

## 2025-08-12 PROCEDURE — OTHER URINE PREGNANCY TEST: OTHER

## 2025-08-12 PROCEDURE — OTHER ISOTRETINOIN INITIATION: OTHER

## 2025-08-12 PROCEDURE — OTHER PRESCRIPTION MEDICATION MANAGEMENT: OTHER

## 2025-08-12 PROCEDURE — OTHER PATIENT SPECIFIC COUNSELING: OTHER

## 2025-08-12 PROCEDURE — OTHER PRESCRIPTION: OTHER

## 2025-08-12 PROCEDURE — OTHER ORDER TESTS: OTHER

## 2025-08-12 PROCEDURE — 81025 URINE PREGNANCY TEST: CPT

## 2025-08-12 PROCEDURE — OTHER COUNSELING: OTHER

## 2025-08-12 PROCEDURE — 99214 OFFICE O/P EST MOD 30 MIN: CPT

## 2025-08-12 PROCEDURE — OTHER HIGH RISK MEDICATION MONITORING: OTHER

## 2025-08-12 RX ORDER — DOXYCYCLINE MONOHYDRATE 100 MG/1
CAPSULE ORAL BID
Qty: 60 | Refills: 1 | Status: ERX | COMMUNITY
Start: 2025-08-12

## 2025-08-12 ASSESSMENT — LOCATION SIMPLE DESCRIPTION DERM: LOCATION SIMPLE: LEFT CHEEK

## 2025-08-12 ASSESSMENT — LOCATION ZONE DERM: LOCATION ZONE: FACE

## 2025-08-12 ASSESSMENT — LOCATION DETAILED DESCRIPTION DERM: LOCATION DETAILED: LEFT INFERIOR CENTRAL MALAR CHEEK
